# Patient Record
Sex: FEMALE | Race: WHITE | NOT HISPANIC OR LATINO | Employment: FULL TIME | URBAN - METROPOLITAN AREA
[De-identification: names, ages, dates, MRNs, and addresses within clinical notes are randomized per-mention and may not be internally consistent; named-entity substitution may affect disease eponyms.]

---
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
## 2020-11-30 ENCOUNTER — PRENATAL OFFICE VISIT (OUTPATIENT)
Dept: FAMILY MEDICINE | Facility: CLINIC | Age: 20
End: 2020-11-30
Payer: COMMERCIAL

## 2020-11-30 DIAGNOSIS — O09.90 SUPERVISION OF HIGH RISK PREGNANCY, ANTEPARTUM: Primary | ICD-10-CM

## 2020-11-30 PROCEDURE — 99207 PR NO CHARGE NURSE ONLY: CPT

## 2020-11-30 RX ORDER — PRENATAL VIT/IRON FUM/FOLIC AC 27MG-0.8MG
1 TABLET ORAL DAILY
Status: ON HOLD | COMMUNITY
End: 2021-02-08

## 2020-11-30 NOTE — PROGRESS NOTES
OB Intake phone visit with verbal patient permission.      SERGE [place] is  EDC by early ultrasound at [facility] is  2021.  See CareEverywhere.  She had one appointment. At [facility] in early July and hasn't had anymore visits as she was waiting for insurance.   She says she's had no problems and that the baby is active.      I have her scheduled for 1st OB lab work and gestational diabetes screening and fetal survey this Wed.,  and to see Dr. Burton to establish care Wed., .     Thanks

## 2020-12-02 ENCOUNTER — HOSPITAL ENCOUNTER (OUTPATIENT)
Dept: ULTRASOUND IMAGING | Facility: CLINIC | Age: 20
Discharge: HOME | End: 2020-12-02
Attending: FAMILY MEDICINE | Admitting: FAMILY MEDICINE
Payer: COMMERCIAL

## 2020-12-02 DIAGNOSIS — O09.90 SUPERVISION OF HIGH RISK PREGNANCY, ANTEPARTUM: ICD-10-CM

## 2020-12-02 PROCEDURE — 76805 OB US >/= 14 WKS SNGL FETUS: CPT

## 2020-12-09 ENCOUNTER — PRENATAL OFFICE VISIT (OUTPATIENT)
Dept: FAMILY MEDICINE | Facility: CLINIC | Age: 20
End: 2020-12-09
Payer: COMMERCIAL

## 2020-12-09 VITALS
RESPIRATION RATE: 18 BRPM | WEIGHT: 177 LBS | HEIGHT: 68 IN | HEART RATE: 120 BPM | TEMPERATURE: 95.7 F | SYSTOLIC BLOOD PRESSURE: 122 MMHG | DIASTOLIC BLOOD PRESSURE: 80 MMHG | BODY MASS INDEX: 26.83 KG/M2 | OXYGEN SATURATION: 95 %

## 2020-12-09 DIAGNOSIS — O09.90 HIGH-RISK PREGNANCY SUPERVISION, UNSPECIFIED TRIMESTER: Primary | ICD-10-CM

## 2020-12-09 PROCEDURE — 99207 PR PRENATAL VISIT: CPT | Performed by: FAMILY MEDICINE

## 2020-12-09 SDOH — HEALTH STABILITY: MENTAL HEALTH: HOW MANY STANDARD DRINKS CONTAINING ALCOHOL DO YOU HAVE ON A TYPICAL DAY?: NOT ASKED

## 2020-12-09 SDOH — HEALTH STABILITY: MENTAL HEALTH: HOW OFTEN DO YOU HAVE A DRINK CONTAINING ALCOHOL?: NEVER

## 2020-12-09 SDOH — HEALTH STABILITY: MENTAL HEALTH: HOW OFTEN DO YOU HAVE 6 OR MORE DRINKS ON ONE OCCASION?: NEVER

## 2020-12-09 ASSESSMENT — MIFFLIN-ST. JEOR: SCORE: 1616.37

## 2020-12-09 ASSESSMENT — PAIN SCALES - GENERAL: PAINLEVEL: NO PAIN (0)

## 2020-12-09 NOTE — PROGRESS NOTES
Very nice 20-year-old G3, P0 at 30 weeks gestation by first trimester ultrasound comes to establish care.  Was seen initially in [place] with first trimester bleeding and had an ultrasound done and has not followed up since then.  She had insurance issues, but now she is working and has insurance again.  She has had great fetal movement, no loss of fluid or vaginal bleeding.  Her first trimester labs are pending today.  She does not have time to complete her diabetic screen today but will return.  I will see her back in 2 weeks.  She has no significant past medical or surgical history.  Reviewed her ultrasound from 7 days ago as she did not have a 20-week anatomy scan, and there was plenty of suboptimal views.  Hope to repeat that in 3 to 4 weeks for both growth and another look.  Next visit plan for her full exam.

## 2020-12-24 ENCOUNTER — PRENATAL OFFICE VISIT (OUTPATIENT)
Dept: FAMILY MEDICINE | Facility: CLINIC | Age: 20
End: 2020-12-24
Payer: COMMERCIAL

## 2020-12-24 VITALS
RESPIRATION RATE: 18 BRPM | SYSTOLIC BLOOD PRESSURE: 116 MMHG | OXYGEN SATURATION: 97 % | HEART RATE: 96 BPM | WEIGHT: 179 LBS | BODY MASS INDEX: 27.22 KG/M2 | DIASTOLIC BLOOD PRESSURE: 68 MMHG | TEMPERATURE: 95.9 F

## 2020-12-24 DIAGNOSIS — Z23 NEED FOR VACCINATION: ICD-10-CM

## 2020-12-24 DIAGNOSIS — O09.90 HIGH-RISK PREGNANCY SUPERVISION, UNSPECIFIED TRIMESTER: Primary | ICD-10-CM

## 2020-12-24 PROCEDURE — 90715 TDAP VACCINE 7 YRS/> IM: CPT | Performed by: FAMILY MEDICINE

## 2020-12-24 PROCEDURE — 99207 PR PRENATAL VISIT: CPT | Performed by: FAMILY MEDICINE

## 2020-12-24 PROCEDURE — 90471 IMMUNIZATION ADMIN: CPT | Performed by: FAMILY MEDICINE

## 2020-12-24 ASSESSMENT — PAIN SCALES - GENERAL: PAINLEVEL: NO PAIN (0)

## 2020-12-24 NOTE — PROGRESS NOTES
Concerns:     Doing well.  No concerns today.  No vaginal bleeding, LOF.  No contractions.  Discussed kick counts and fetal movement.  Discussed PTL, PROM, and when to call or come in.  RTC 2 weeks  Plans on coming in next week for glucola and labs. Averse to needles.    Considerations:   1. Lack of prenatal care. Insurance issues. Initially seen in [facility]. Good dating  2. Inadequate views - plan repeat US  3. Needs exam    Prenatal Labs: No results found for: ABO, RH, AS, HEPBANG, CHPCRT, GCPCRT, TREPAB, RUBELLAABIGG, HGB, HIV     No results found for: PAP    Estimated Date of Delivery: Feb 17, 2021     3rd Trimester  GBS: __  Glucola:  ___  Imms: DtaP _x_, Flu_declined_    MD Jose Castillo MD

## 2020-12-24 NOTE — PROGRESS NOTES
Prior to immunization administration, verified patients identity using patient s name and date of birth. Please see Immunization Activity for additional information.     Screening Questionnaire for Adult Immunization    Are you sick today?   No   Do you have allergies to medications, food, a vaccine component or latex?   No   Have you ever had a serious reaction after receiving a vaccination?   No   Do you have a long-term health problem with heart, lung, kidney, or metabolic disease (e.g., diabetes), asthma, a blood disorder, no spleen, complement component deficiency, a cochlear implant, or a spinal fluid leak?  Are you on long-term aspirin therapy?   No   Do you have cancer, leukemia, HIV/AIDS, or any other immune system problem?   No   Do you have a parent, brother, or sister with an immune system problem?   No   In the past 3 months, have you taken medications that affect  your immune system, such as prednisone, other steroids, or anticancer drugs; drugs for the treatment of rheumatoid arthritis, Crohn s disease, or psoriasis; or have you had radiation treatments?   No   Have you had a seizure, or a brain or other nervous system problem?   No   During the past year, have you received a transfusion of blood or blood    products, or been given immune (gamma) globulin or antiviral drug?   No   For women: Are you pregnant or is there a chance you could become       pregnant during the next month?   No   Have you received any vaccinations in the past 4 weeks?   No     Immunization questionnaire answers were all negative.        Per orders of Dr. Burton, injection of Tdap  given by Gisselle Callaway MA. Patient instructed to remain in clinic for 15 minutes afterwards, and to report any adverse reaction to me immediately.       Screening performed by Gisselle Callaway MA on 12/24/2020 at 10:54 AM.

## 2021-01-07 ENCOUNTER — PRENATAL OFFICE VISIT (OUTPATIENT)
Dept: FAMILY MEDICINE | Facility: CLINIC | Age: 21
End: 2021-01-07
Payer: COMMERCIAL

## 2021-01-07 ENCOUNTER — HOSPITAL ENCOUNTER (OUTPATIENT)
Dept: ULTRASOUND IMAGING | Facility: CLINIC | Age: 21
Discharge: HOME | End: 2021-01-07
Attending: FAMILY MEDICINE | Admitting: FAMILY MEDICINE
Payer: COMMERCIAL

## 2021-01-07 VITALS
DIASTOLIC BLOOD PRESSURE: 80 MMHG | OXYGEN SATURATION: 96 % | BODY MASS INDEX: 27.58 KG/M2 | SYSTOLIC BLOOD PRESSURE: 120 MMHG | HEART RATE: 110 BPM | RESPIRATION RATE: 18 BRPM | WEIGHT: 182 LBS | TEMPERATURE: 97.2 F | HEIGHT: 68 IN

## 2021-01-07 DIAGNOSIS — Z71.6 ENCOUNTER FOR SMOKING CESSATION COUNSELING: ICD-10-CM

## 2021-01-07 DIAGNOSIS — A59.9 TRICHIMONIASIS: ICD-10-CM

## 2021-01-07 DIAGNOSIS — O09.90 HIGH-RISK PREGNANCY SUPERVISION, UNSPECIFIED TRIMESTER: ICD-10-CM

## 2021-01-07 DIAGNOSIS — O09.90 HIGH-RISK PREGNANCY SUPERVISION, UNSPECIFIED TRIMESTER: Primary | ICD-10-CM

## 2021-01-07 LAB
SPECIMEN SOURCE: ABNORMAL
WET PREP SPEC: ABNORMAL

## 2021-01-07 PROCEDURE — 87210 SMEAR WET MOUNT SALINE/INK: CPT | Performed by: FAMILY MEDICINE

## 2021-01-07 PROCEDURE — 76816 OB US FOLLOW-UP PER FETUS: CPT

## 2021-01-07 PROCEDURE — 99207 PR PRENATAL VISIT: CPT | Performed by: FAMILY MEDICINE

## 2021-01-07 PROCEDURE — G0145 SCR C/V CYTO,THINLAYER,RESCR: HCPCS | Performed by: FAMILY MEDICINE

## 2021-01-07 ASSESSMENT — PAIN SCALES - GENERAL: PAINLEVEL: NO PAIN (0)

## 2021-01-07 ASSESSMENT — MIFFLIN-ST. JEOR: SCORE: 1642.23

## 2021-01-07 NOTE — PROGRESS NOTES
"    Concerns:   Agrees to finally do her Glucola and a prenatal labs today  Completed her pelvic exam which shows an adequate birth canal, but thick greenish discharge present     doing well.  No concerns today.  No vaginal bleeding, LOF.  No contractions.  Discussed kick counts and fetal movement.  Discussed PTL, PROM, and when to call or come in.  RTC 2 weeks       Considerations:   1. Lack of prenatal care. Insurance issues. Initially seen in [facility]. Good dating based on first tri US  2. Inadequate views - plan repeat US  3. Needs exam  4. Smoker - cutting back from 3ppd, to 1/2ppd  5. Admits to occasional marijuana use (1x per wk)  6. History of multisubstance abuse 2 years ago.  Admits to being \"exposed\" via her significant other to methamphetamines recently which might show up in a urine drug screen.  But denies personal use      Prenatal Labs: No results found for: ABO, RH, AS, HEPBANG, CHPCRT, GCPCRT, TREPAB, RUBELLAABIGG, HGB, HIV      No results found for: PAP     Estimated Date of Delivery: Feb 17, 2021      3rd Trimester  GBS: __  Glucola:  ___  Imms: DtaP _x_, Flu_declined_  "

## 2021-01-08 ENCOUNTER — TELEPHONE (OUTPATIENT)
Dept: FAMILY MEDICINE | Facility: CLINIC | Age: 21
End: 2021-01-08

## 2021-01-08 NOTE — TELEPHONE ENCOUNTER
Called and LM for patient to call back. Please relay results below. Please see what pharmacy patient would like script sent to. Please help patient set up lab only appointment for missing labs.   Rosi Park MA

## 2021-01-08 NOTE — LETTER
[address] [address]  [phone]        January 12, 2021    Jyoti New  [address] [address] [place] [id number]          Dear Jyoti,    Please call us regarding your results ASAP.    Sincerely,        Jose Burton MD

## 2021-01-08 NOTE — TELEPHONE ENCOUNTER
----- Message from Jose Burton MD sent at 1/8/2021  4:21 PM CST -----  Please call with results.   She has trichomonas and needs antibiotics for her and her partner.  This is a STD.  Let me know if I can send a prescription.  She also did not do her labs and glucose test, will she come back to do that?

## 2021-01-11 LAB
COPATH REPORT: NORMAL
PAP: NORMAL

## 2021-01-12 NOTE — TELEPHONE ENCOUNTER
3rd attempt. Left message for patient to return call.  Sent letter  Natalie Jackson MA 1/12/2021

## 2021-01-20 ENCOUNTER — PRENATAL OFFICE VISIT (OUTPATIENT)
Dept: FAMILY MEDICINE | Facility: CLINIC | Age: 21
End: 2021-01-20
Payer: COMMERCIAL

## 2021-01-20 ENCOUNTER — TELEPHONE (OUTPATIENT)
Dept: FAMILY MEDICINE | Facility: CLINIC | Age: 21
End: 2021-01-20

## 2021-01-20 ENCOUNTER — HOSPITAL ENCOUNTER (OUTPATIENT)
Dept: GENERAL RADIOLOGY | Facility: CLINIC | Age: 21
Discharge: HOME | End: 2021-01-20
Attending: FAMILY MEDICINE | Admitting: FAMILY MEDICINE
Payer: COMMERCIAL

## 2021-01-20 DIAGNOSIS — R06.02 SHORTNESS OF BREATH: ICD-10-CM

## 2021-01-20 DIAGNOSIS — O09.90 HIGH-RISK PREGNANCY SUPERVISION, UNSPECIFIED TRIMESTER: Primary | ICD-10-CM

## 2021-01-20 LAB
SARS-COV-2 RNA RESP QL NAA+PROBE: NORMAL
SPECIMEN SOURCE: NORMAL

## 2021-01-20 PROCEDURE — 71046 X-RAY EXAM CHEST 2 VIEWS: CPT

## 2021-01-20 PROCEDURE — 99207 PR PRENATAL VISIT: CPT | Performed by: FAMILY MEDICINE

## 2021-01-20 PROCEDURE — U0005 INFEC AGEN DETEC AMPLI PROBE: HCPCS | Performed by: FAMILY MEDICINE

## 2021-01-20 PROCEDURE — U0003 INFECTIOUS AGENT DETECTION BY NUCLEIC ACID (DNA OR RNA); SEVERE ACUTE RESPIRATORY SYNDROME CORONAVIRUS 2 (SARS-COV-2) (CORONAVIRUS DISEASE [COVID-19]), AMPLIFIED PROBE TECHNIQUE, MAKING USE OF HIGH THROUGHPUT TECHNOLOGIES AS DESCRIBED BY CMS-2020-01-R: HCPCS | Performed by: FAMILY MEDICINE

## 2021-01-20 PROCEDURE — 99213 OFFICE O/P EST LOW 20 MIN: CPT | Mod: 25 | Performed by: FAMILY MEDICINE

## 2021-01-20 NOTE — TELEPHONE ENCOUNTER
Symptoms of chills cold hard time breathing.  Not sure if she can taste or smell anything.  Patient talked with the birth place but routed to the clinic for assessment and an order for a covid test.  Been warm but not tested jacoby CARDOZO

## 2021-01-20 NOTE — PROGRESS NOTES
"  Assessment & Plan       ICD-10-CM    1. High-risk pregnancy supervision, unspecified trimester  O09.90 SARS-CoV-2 COVID-19 Virus (Coronavirus) by PCR   2. Shortness of breath  R06.02 Symptomatic COVID-19 Virus (Coronavirus) by PCR     XR Chest 2 Views     Symptomatic COVID-19 Virus (Coronavirus) by PCR        A possible anosmia, shortness of breath with exertion, cough, low-grade fever and chills highly likely for COVID-19.  Swab obtained today and sent stat.  She is at high risk of severe disease given her third trimester status.  She is aware of this.  She will buy a home oximeter and do daily oxygen measurements and call me if decreasing into the low 90s.  I will also have my staff call her daily for an update.  Chest x-ray obtained today looking for infiltrates as well.  Strongly encouraged to quit smoking.         Tobacco Cessation:   reports that she has been smoking. She has never used smokeless tobacco.      BMI:   Estimated body mass index is 27.51 kg/m  as calculated from the following:    Height as of 1/7/21: 1.732 m (5' 8.2\").    Weight as of 1/7/21: 82.6 kg (182 lb).             No follow-ups on file.    Jose Burton MD  [facility] is a 20 year old who presents to clinic today for the following health issues     HPI       Acute Illness  Acute illness concerns: SOB cough  Onset/Duration: 6 days  Symptoms:  Fever: YES- unknown  Chills/Sweats: YES  Headache (location?): YES- at night  Sinus Pressure: no  Conjunctivitis:  YES  Ear Pain: no  Rhinorrhea: no  Congestion: YES  Sore Throat: no  Cough: YES-barking  Wheeze: no  Decreased Appetite: no  Nausea: YES  Vomiting: no  Diarrhea: YES- off and on  Dysuria/Freq.: no  Dysuria or Hematuria: no  Fatigue/Achiness: YES  Sick/Strep Exposure: no  Therapies tried and outcome: theraflu-not effective      Review of Systems         Objective    Pulse 90   Temp 97.7  F (36.5  C)   Resp 12   SpO2 99%   There is " no height or weight on file to calculate BMI.  Physical Exam   Well-appearing.  Nontoxic.  Mentation normal.  Oropharynx unremarkable.  Heart regular.  Lungs clear bilaterally with no increased work of breathing.  He is warm well perfused no edema.  Neurologically nonfocal.

## 2021-01-21 VITALS — RESPIRATION RATE: 12 BRPM | OXYGEN SATURATION: 99 % | HEART RATE: 90 BPM | TEMPERATURE: 97.7 F

## 2021-01-21 LAB
LABORATORY COMMENT REPORT: NORMAL
SARS-COV-2 RNA RESP QL NAA+PROBE: NEGATIVE
SPECIMEN SOURCE: NORMAL

## 2021-01-22 ENCOUNTER — TELEPHONE (OUTPATIENT)
Dept: FAMILY MEDICINE | Facility: CLINIC | Age: 21
End: 2021-01-22

## 2021-01-22 RX ORDER — AZITHROMYCIN 500 MG/1
1000 TABLET, FILM COATED ORAL ONCE
Qty: 2 TABLET | Refills: 0 | Status: SHIPPED | OUTPATIENT
Start: 2021-01-22 | End: 2021-01-22

## 2021-01-22 NOTE — TELEPHONE ENCOUNTER
----- Message from Jose Burton MD sent at 1/22/2021  4:33 PM CST -----  Please let her know I called in azithromycin 1 gm

## 2021-01-22 NOTE — TELEPHONE ENCOUNTER
Patient was informed. She will pick it up at the pharmacy and she stated understanding.    Carlos Manuel Gonzalez, CMA

## 2021-01-28 ENCOUNTER — PRENATAL OFFICE VISIT (OUTPATIENT)
Dept: FAMILY MEDICINE | Facility: CLINIC | Age: 21
End: 2021-01-28
Payer: COMMERCIAL

## 2021-01-28 DIAGNOSIS — O09.90 SUPERVISION OF HIGH RISK PREGNANCY, ANTEPARTUM: ICD-10-CM

## 2021-01-28 DIAGNOSIS — R05.9 COUGH: Primary | ICD-10-CM

## 2021-01-28 LAB
ABO + RH BLD: NORMAL
ABO + RH BLD: NORMAL
BLD GP AB SCN SERPL QL: NORMAL
BLOOD BANK CMNT PATIENT-IMP: NORMAL
ERYTHROCYTE [DISTWIDTH] IN BLOOD BY AUTOMATED COUNT: 12.2 % (ref 10–15)
FLUAV+FLUBV AG SPEC QL: NEGATIVE
FLUAV+FLUBV AG SPEC QL: NEGATIVE
GLUCOSE 1H P 50 G GLC PO SERPL-MCNC: 94 MG/DL (ref 60–129)
HCT VFR BLD AUTO: 36.1 % (ref 35–47)
HGB BLD-MCNC: 12.2 G/DL (ref 11.7–15.7)
LABORATORY COMMENT REPORT: NORMAL
MCH RBC QN AUTO: 30.4 PG (ref 26.5–33)
MCHC RBC AUTO-ENTMCNC: 33.8 G/DL (ref 31.5–36.5)
MCV RBC AUTO: 90 FL (ref 78–100)
PLATELET # BLD AUTO: 218 10E9/L (ref 150–450)
RBC # BLD AUTO: 4.01 10E12/L (ref 3.8–5.2)
SARS-COV-2 RNA RESP QL NAA+PROBE: NEGATIVE
SARS-COV-2 RNA RESP QL NAA+PROBE: NORMAL
SPECIMEN EXP DATE BLD: NORMAL
SPECIMEN SOURCE: NORMAL
WBC # BLD AUTO: 7.8 10E9/L (ref 4–11)

## 2021-01-28 PROCEDURE — 87086 URINE CULTURE/COLONY COUNT: CPT | Performed by: FAMILY MEDICINE

## 2021-01-28 PROCEDURE — 85027 COMPLETE CBC AUTOMATED: CPT | Performed by: FAMILY MEDICINE

## 2021-01-28 PROCEDURE — 86850 RBC ANTIBODY SCREEN: CPT | Performed by: FAMILY MEDICINE

## 2021-01-28 PROCEDURE — 82950 GLUCOSE TEST: CPT | Performed by: FAMILY MEDICINE

## 2021-01-28 PROCEDURE — 86762 RUBELLA ANTIBODY: CPT | Performed by: FAMILY MEDICINE

## 2021-01-28 PROCEDURE — U0003 INFECTIOUS AGENT DETECTION BY NUCLEIC ACID (DNA OR RNA); SEVERE ACUTE RESPIRATORY SYNDROME CORONAVIRUS 2 (SARS-COV-2) (CORONAVIRUS DISEASE [COVID-19]), AMPLIFIED PROBE TECHNIQUE, MAKING USE OF HIGH THROUGHPUT TECHNOLOGIES AS DESCRIBED BY CMS-2020-01-R: HCPCS | Performed by: FAMILY MEDICINE

## 2021-01-28 PROCEDURE — 87340 HEPATITIS B SURFACE AG IA: CPT | Performed by: FAMILY MEDICINE

## 2021-01-28 PROCEDURE — 86900 BLOOD TYPING SEROLOGIC ABO: CPT | Performed by: FAMILY MEDICINE

## 2021-01-28 PROCEDURE — 86780 TREPONEMA PALLIDUM: CPT | Mod: 90 | Performed by: FAMILY MEDICINE

## 2021-01-28 PROCEDURE — 99207 PR PRENATAL VISIT: CPT | Performed by: FAMILY MEDICINE

## 2021-01-28 PROCEDURE — 87804 INFLUENZA ASSAY W/OPTIC: CPT | Performed by: FAMILY MEDICINE

## 2021-01-28 PROCEDURE — 36415 COLL VENOUS BLD VENIPUNCTURE: CPT | Performed by: FAMILY MEDICINE

## 2021-01-28 PROCEDURE — 87389 HIV-1 AG W/HIV-1&-2 AB AG IA: CPT | Performed by: FAMILY MEDICINE

## 2021-01-28 PROCEDURE — 87653 STREP B DNA AMP PROBE: CPT | Performed by: FAMILY MEDICINE

## 2021-01-28 PROCEDURE — 99000 SPECIMEN HANDLING OFFICE-LAB: CPT | Performed by: FAMILY MEDICINE

## 2021-01-28 PROCEDURE — U0005 INFEC AGEN DETEC AMPLI PROBE: HCPCS | Performed by: FAMILY MEDICINE

## 2021-01-28 PROCEDURE — 86901 BLOOD TYPING SEROLOGIC RH(D): CPT | Performed by: FAMILY MEDICINE

## 2021-01-28 NOTE — PROGRESS NOTES
Concerns:   Feeling 50% better.  No further fevers.  Still mildly short of breath with activity.  Mild dry cough present.  No lower extremity edema.  Good fetal movement.  No vaginal bleeding, LOF, contractions.  No HA, RUQ pain, N/V, visual changes.  Labor precautions discussed.  RTC 1 week.    Repeat Covid swab today, last one was negative last week  Influenza swab  GBS swab obtained  Had nursing staff personally walked her to lab for completion of her 28-week Glucola, prenatal labs, urine drug screen.  Seen in the isolation room  Cervix check next visit    Jose Burton MD

## 2021-01-29 LAB
BACTERIA SPEC CULT: NORMAL
GP B STREP DNA SPEC QL NAA+PROBE: NEGATIVE
HBV SURFACE AG SERPL QL IA: NONREACTIVE
HIV 1+2 AB+HIV1 P24 AG SERPL QL IA: NONREACTIVE
Lab: NORMAL
RUBV IGG SERPL IA-ACNC: 35 IU/ML
SPECIMEN SOURCE: NORMAL
SPECIMEN SOURCE: NORMAL
T PALLIDUM AB SER QL: NONREACTIVE

## 2021-02-01 ENCOUNTER — NURSE TRIAGE (OUTPATIENT)
Dept: FAMILY MEDICINE | Facility: CLINIC | Age: 21
End: 2021-02-01

## 2021-02-01 ENCOUNTER — HOSPITAL ENCOUNTER (OUTPATIENT)
Facility: CLINIC | Age: 21
Discharge: HOME | End: 2021-02-01
Attending: FAMILY MEDICINE | Admitting: FAMILY MEDICINE
Payer: COMMERCIAL

## 2021-02-01 VITALS — TEMPERATURE: 97.5 F | DIASTOLIC BLOOD PRESSURE: 70 MMHG | SYSTOLIC BLOOD PRESSURE: 136 MMHG

## 2021-02-01 LAB
ALBUMIN UR-MCNC: NEGATIVE MG/DL
AMPHETAMINES UR QL: NOT DETECTED NG/ML
APPEARANCE UR: CLEAR
BACTERIA #/AREA URNS HPF: ABNORMAL /HPF
BARBITURATES UR QL SCN: NOT DETECTED NG/ML
BENZODIAZ UR QL SCN: NOT DETECTED NG/ML
BILIRUB UR QL STRIP: NEGATIVE
BUPRENORPHINE UR QL: NOT DETECTED NG/ML
CANNABINOIDS UR QL: NOT DETECTED NG/ML
COCAINE UR QL SCN: NOT DETECTED NG/ML
COLOR UR AUTO: YELLOW
D-METHAMPHET UR QL: NOT DETECTED NG/ML
GLUCOSE UR STRIP-MCNC: NEGATIVE MG/DL
HGB UR QL STRIP: NEGATIVE
KETONES UR STRIP-MCNC: NEGATIVE MG/DL
LEUKOCYTE ESTERASE UR QL STRIP: ABNORMAL
METHADONE UR QL SCN: NOT DETECTED NG/ML
MUCOUS THREADS #/AREA URNS LPF: PRESENT /LPF
NITRATE UR QL: NEGATIVE
OPIATES UR QL SCN: NOT DETECTED NG/ML
OXYCODONE UR QL SCN: NOT DETECTED NG/ML
PCP UR QL SCN: NOT DETECTED NG/ML
PH UR STRIP: 7 PH (ref 5–7)
PROPOXYPH UR QL: NOT DETECTED NG/ML
RBC #/AREA URNS AUTO: 1 /HPF (ref 0–2)
RUPTURE OF FETAL MEMBRANES BY ROM PLUS: NEGATIVE
SOURCE: ABNORMAL
SP GR UR STRIP: 1.01 (ref 1–1.03)
SPECIMEN SOURCE: NORMAL
SQUAMOUS #/AREA URNS AUTO: 7 /HPF (ref 0–1)
TRICYCLICS UR QL SCN: NOT DETECTED NG/ML
UROBILINOGEN UR STRIP-MCNC: 0 MG/DL (ref 0–2)
WBC #/AREA URNS AUTO: 2 /HPF (ref 0–5)
WET PREP SPEC: NORMAL

## 2021-02-01 PROCEDURE — 84112 EVAL AMNIOTIC FLUID PROTEIN: CPT | Performed by: FAMILY MEDICINE

## 2021-02-01 PROCEDURE — 80306 DRUG TEST PRSMV INSTRMNT: CPT | Performed by: FAMILY MEDICINE

## 2021-02-01 PROCEDURE — G0463 HOSPITAL OUTPT CLINIC VISIT: HCPCS

## 2021-02-01 PROCEDURE — 87210 SMEAR WET MOUNT SALINE/INK: CPT | Performed by: FAMILY MEDICINE

## 2021-02-01 PROCEDURE — 81001 URINALYSIS AUTO W/SCOPE: CPT | Performed by: FAMILY MEDICINE

## 2021-02-01 NOTE — PROGRESS NOTES
Lab results all WNL.  Last two blood pressures were WNL.  Called jodi Ryan for discharge to home.

## 2021-02-01 NOTE — PROGRESS NOTES
S:  Discharge from triage.   A:  FHT's 125, Moderate variability, Accels present, no decels noted. Contractions none.  Cervical exam not checked this visit. Wet prep, UA, and ROM+ were all negative.  R:  Written and verbal D/C instruction provided. Pt. Verbalized understanding of D/C instructions and will follow up with Dr. bradford as previously scheduled.   Verbalizes understanding that she will call or return to the [facility] with any further questions or concerns.   Pt. D/C'd via ambulation with SO    Nursing Discharge Checklist  Discharge order entered: Yes  Patient care order entered: Yes  Charges entered: Yes  IV start and stop times have been documented in Epic? N/A  NST start and stop times have been documented in Epic Doc F/S? N/A

## 2021-02-01 NOTE — PROGRESS NOTES
S: Triage Arrival  B: Jyoti is a 20 y.o.  @ 37w 5d who presents with complaint of possible labor. Had some contractions last evening. Noted discharge more today.   A: EFM applied. FHT's125 with moderate variability, accels present, no decels noted on strip. Contractions none at this point. Collected a wet prep and ROM+ and UA sent to lab. Patient is a smoker and admits to history of marijuana use during the pregnancy.   R: Will notify MD to obtain further orders.

## 2021-02-01 NOTE — DISCHARGE INSTRUCTIONS
OB Triage Discharge Instructions    Diet:  Regular diet as tolerated    Activity:  As tolerated    Other Special Instructions:  Keep MD appointment on Thursday    Reason for being seen in L&D:  Possible labor, increased discharge    Treatment/procedures performed in L&D: monitoring, VS, lab work    Call the Birthplace at [phone] if you have:  ? 5 or more contractions in one hour  ? Leaking of fluid from your vaginal area  ? Decreased fetal movement (follow kick count instructions)  ? Bleeding from your vaginal area  ? Swelling in your face, or increased swelling in your hands or legs  ? Headaches or vision problems such as blurring or seeing spots or starts  ? Nausea or vomiting lasting for more than 24 hours  ? An increase in weight (5lbs/week)  ? Epigastric pain (sometimes confused with heartburn that does not go away or a very bad stomach ache)    If you have any questions, please follow up with your doctor.        Patient Signature: ______________________________________________________________  By signing the above I acknowledge that a nurse or my care provider has explained the instruction to me and I have had any questions regarding my care explained to me.        Discharge Nurse Signature: _______________________________ 2/1/2021  10:02 AM    Method of discharge: ambulation    Accompanied by: SO  Time of discharge: 9111

## 2021-02-01 NOTE — TELEPHONE ENCOUNTER
"Patient is unsure how far apart her contractions are, but believes them to be < 5 minutes.  She states she is in pain and believes she lost her mucous plug over the weekend.  She states she had some spotting last night.    RN called L&D to inform them patient was on her way.    Reason for Disposition    Vaginal bleeding or spotting    First baby (primipara) with contractions < 6 minutes apart, and present 2 hours    Additional Information    Negative: Pregnant < 37 weeks (i.e., )    Negative: Uncertain delivery date and possibly pregnant < 37 weeks (i.e., )    Negative: Passed out (i.e., fainted, collapsed and was not responding)    Negative: Shock suspected (e.g., cold/pale/clammy skin, too weak to stand, low BP, rapid pulse)    Negative: Difficult to awaken or acting confused (e.g., disoriented, slurred speech)    Negative: SEVERE constant abdominal pain (e.g., excruciating) and present > 1 hour    Negative: Severe bleeding (e.g., continuous red blood from vagina, or large blood clots)    Negative: Umbilical cord hanging out of the vagina (shiny, white, curled appearance, \"like telephone cord\")    Negative: Uncontrollable urge to push (i.e., feels like baby is coming out now)    Negative: Can see baby    Negative: Sounds like a life-threatening emergency to the triager    Protocols used: PREGNANCY - LABOR-A-OH      "

## 2021-02-04 ENCOUNTER — PRENATAL OFFICE VISIT (OUTPATIENT)
Dept: FAMILY MEDICINE | Facility: CLINIC | Age: 21
End: 2021-02-04
Payer: COMMERCIAL

## 2021-02-04 VITALS
BODY MASS INDEX: 29.63 KG/M2 | TEMPERATURE: 95.8 F | OXYGEN SATURATION: 97 % | SYSTOLIC BLOOD PRESSURE: 128 MMHG | DIASTOLIC BLOOD PRESSURE: 88 MMHG | WEIGHT: 196 LBS | HEART RATE: 114 BPM | RESPIRATION RATE: 18 BRPM

## 2021-02-04 DIAGNOSIS — O09.90 HIGH-RISK PREGNANCY SUPERVISION, UNSPECIFIED TRIMESTER: Primary | ICD-10-CM

## 2021-02-04 PROCEDURE — 99207 PR PRENATAL VISIT: CPT | Performed by: FAMILY MEDICINE

## 2021-02-04 ASSESSMENT — PAIN SCALES - GENERAL: PAINLEVEL: NO PAIN (0)

## 2021-02-04 NOTE — PROGRESS NOTES
"Doing better  Cough mostly gone. covid neg x2. Flu neg  3 cm today !    Considerations:   1. Lack of prenatal care. Insurance issues. Initially seen in [facility]. Good dating based on first tri US. No care after that  2. Inadequate views - plan repeat US  3. Needs exam  4. Smoker - cutting back from 3ppd, to 1/2ppd  5. Admits to occasional marijuana use (1x per wk)  6. History of multisubstance abuse 2 years ago.  Admits to being \"exposed\" via her significant other to methamphetamines recently which might show up in a urine drug screen.  But denies personal use. UDS neg      Prenatal Labs:   Lab Results   Component Value Date    ABO A 01/28/2021    RH Pos 01/28/2021    AS Neg 01/28/2021    HEPBANG Nonreactive 01/28/2021    HGB 12.2 01/28/2021        Lab Results   Component Value Date    PAP NIL 01/07/2021       Estimated Date of Delivery: Feb 17, 2021     3rd Trimester  GBS: _neg_  Glucola:  _neg__  Imms: DtaP _x_, Flu__    Jose Burton MD        "

## 2021-02-05 ENCOUNTER — TELEPHONE (OUTPATIENT)
Dept: FAMILY MEDICINE | Facility: CLINIC | Age: 21
End: 2021-02-05

## 2021-02-05 NOTE — TELEPHONE ENCOUNTER
I called pt and left msg that the forms are done and they can be picked up at the .  Nataliia Callaway MA

## 2021-02-05 NOTE — TELEPHONE ENCOUNTER
Dr. GARCÍA    Patient had seen you yesterday and gave FMLA forms to you to fill out.. she was checking to see if you had them done.  I called and spoke to Rosa CARDOZO and said that they are not done yet.    Please call when done and she will  at clinic.    Okay to lm

## 2021-02-06 ENCOUNTER — HOSPITAL ENCOUNTER (INPATIENT)
Facility: CLINIC | Age: 21
LOS: 2 days | Discharge: HOME | End: 2021-02-08
Attending: FAMILY MEDICINE | Admitting: OBSTETRICS & GYNECOLOGY
Payer: COMMERCIAL

## 2021-02-06 ENCOUNTER — APPOINTMENT (OUTPATIENT)
Dept: GENERAL RADIOLOGY | Facility: CLINIC | Age: 21
End: 2021-02-06
Attending: OBSTETRICS & GYNECOLOGY
Payer: COMMERCIAL

## 2021-02-06 DIAGNOSIS — U07.1 LAB TEST POSITIVE FOR DETECTION OF COVID-19 VIRUS: Primary | ICD-10-CM

## 2021-02-06 DIAGNOSIS — D62 ANEMIA DUE TO BLOOD LOSS, ACUTE: ICD-10-CM

## 2021-02-06 PROBLEM — Z37.9 NORMAL LABOR: Status: ACTIVE | Noted: 2021-02-06

## 2021-02-06 LAB
ABO + RH BLD: NORMAL
ABO + RH BLD: NORMAL
ALBUMIN SERPL-MCNC: 2.4 G/DL (ref 3.4–5)
ALP SERPL-CCNC: 212 U/L (ref 40–150)
ALT SERPL W P-5'-P-CCNC: 27 U/L (ref 0–50)
ALT SERPL W P-5'-P-CCNC: 30 U/L (ref 0–50)
AMPHETAMINES UR QL: NOT DETECTED NG/ML
ANION GAP SERPL CALCULATED.3IONS-SCNC: 6 MMOL/L (ref 3–14)
ANION GAP SERPL CALCULATED.3IONS-SCNC: 7 MMOL/L (ref 3–14)
APTT PPP: 27 SEC (ref 22–37)
AST SERPL W P-5'-P-CCNC: 28 U/L (ref 0–45)
AST SERPL W P-5'-P-CCNC: 31 U/L (ref 0–45)
BARBITURATES UR QL SCN: NOT DETECTED NG/ML
BASOPHILS # BLD AUTO: 0.1 10E9/L (ref 0–0.2)
BASOPHILS NFR BLD AUTO: 0.5 %
BENZODIAZ UR QL SCN: NOT DETECTED NG/ML
BILIRUB SERPL-MCNC: 0.2 MG/DL (ref 0.2–1.3)
BLD GP AB SCN SERPL QL: NORMAL
BLOOD BANK CMNT PATIENT-IMP: NORMAL
BUN SERPL-MCNC: 10 MG/DL (ref 7–30)
BUN SERPL-MCNC: 11 MG/DL (ref 7–30)
BUPRENORPHINE UR QL: NOT DETECTED NG/ML
CALCIUM SERPL-MCNC: 8.4 MG/DL (ref 8.5–10.1)
CALCIUM SERPL-MCNC: 9.2 MG/DL (ref 8.5–10.1)
CANNABINOIDS UR QL: NOT DETECTED NG/ML
CHLORIDE SERPL-SCNC: 107 MMOL/L (ref 94–109)
CHLORIDE SERPL-SCNC: 108 MMOL/L (ref 94–109)
CO2 SERPL-SCNC: 23 MMOL/L (ref 20–32)
CO2 SERPL-SCNC: 23 MMOL/L (ref 20–32)
COCAINE UR QL SCN: NOT DETECTED NG/ML
CREAT SERPL-MCNC: 0.68 MG/DL (ref 0.52–1.04)
CREAT SERPL-MCNC: 0.7 MG/DL (ref 0.52–1.04)
CREAT UR-MCNC: 51 MG/DL
CRP SERPL-MCNC: <2.9 MG/L (ref 0–8)
D DIMER PPP FEU-MCNC: 3.9 UG/ML FEU (ref 0–0.5)
D-METHAMPHET UR QL: NOT DETECTED NG/ML
DIFFERENTIAL METHOD BLD: ABNORMAL
EOSINOPHIL NFR BLD AUTO: 0.5 %
ERYTHROCYTE [DISTWIDTH] IN BLOOD BY AUTOMATED COUNT: 12.5 % (ref 10–15)
FIBRINOGEN PPP-MCNC: 459 MG/DL (ref 200–420)
GFR SERPL CREATININE-BSD FRML MDRD: >90 ML/MIN/{1.73_M2}
GFR SERPL CREATININE-BSD FRML MDRD: >90 ML/MIN/{1.73_M2}
GLUCOSE SERPL-MCNC: 79 MG/DL (ref 70–99)
GLUCOSE SERPL-MCNC: 96 MG/DL (ref 70–99)
HCT VFR BLD AUTO: 38.3 % (ref 35–47)
HGB BLD-MCNC: 13 G/DL (ref 11.7–15.7)
IMM GRANULOCYTES # BLD: 0.1 10E9/L (ref 0–0.4)
IMM GRANULOCYTES NFR BLD: 0.5 %
INR PPP: 0.93 (ref 0.86–1.14)
LABORATORY COMMENT REPORT: ABNORMAL
LDH SERPL L TO P-CCNC: 241 U/L (ref 81–234)
LYMPHOCYTES # BLD AUTO: 1.7 10E9/L (ref 0.8–5.3)
LYMPHOCYTES NFR BLD AUTO: 13 %
MCH RBC QN AUTO: 30.6 PG (ref 26.5–33)
MCHC RBC AUTO-ENTMCNC: 33.9 G/DL (ref 31.5–36.5)
MCV RBC AUTO: 90 FL (ref 78–100)
METHADONE UR QL SCN: NOT DETECTED NG/ML
MONOCYTES # BLD AUTO: 0.9 10E9/L (ref 0–1.3)
MONOCYTES NFR BLD AUTO: 7.2 %
NEUTROPHILS # BLD AUTO: 10.3 10E9/L (ref 1.6–8.3)
NEUTROPHILS NFR BLD AUTO: 78.3 %
NRBC # BLD AUTO: 0 10*3/UL
NRBC BLD AUTO-RTO: 0 /100
OPIATES UR QL SCN: NOT DETECTED NG/ML
OXYCODONE UR QL SCN: NOT DETECTED NG/ML
PCP UR QL SCN: NOT DETECTED NG/ML
PLATELET # BLD AUTO: 240 10E9/L (ref 150–450)
POTASSIUM SERPL-SCNC: 4 MMOL/L (ref 3.4–5.3)
POTASSIUM SERPL-SCNC: 4.9 MMOL/L (ref 3.4–5.3)
PROPOXYPH UR QL: NOT DETECTED NG/ML
PROT SERPL-MCNC: 6.2 G/DL (ref 6.8–8.8)
PROT UR-MCNC: 0.12 G/L
PROT/CREAT 24H UR: 0.24 G/G CR (ref 0–0.2)
RBC # BLD AUTO: 4.25 10E12/L (ref 3.8–5.2)
RUPTURE OF FETAL MEMBRANES BY ROM PLUS: POSITIVE
SARS-COV-2 RNA RESP QL NAA+PROBE: POSITIVE
SODIUM SERPL-SCNC: 137 MMOL/L (ref 133–144)
SODIUM SERPL-SCNC: 137 MMOL/L (ref 133–144)
SPECIMEN EXP DATE BLD: NORMAL
SPECIMEN SOURCE: ABNORMAL
TRICYCLICS UR QL SCN: NOT DETECTED NG/ML
TROPONIN I SERPL-MCNC: <0.015 UG/L (ref 0–0.04)
WBC # BLD AUTO: 13.1 10E9/L (ref 4–11)

## 2021-02-06 PROCEDURE — 85379 FIBRIN DEGRADATION QUANT: CPT | Performed by: OBSTETRICS & GYNECOLOGY

## 2021-02-06 PROCEDURE — 722N000001 HC LABOR CARE VAGINAL DELIVERY SINGLE

## 2021-02-06 PROCEDURE — 85025 COMPLETE CBC W/AUTO DIFF WBC: CPT | Performed by: OBSTETRICS & GYNECOLOGY

## 2021-02-06 PROCEDURE — 250N000011 HC RX IP 250 OP 636: Performed by: OBSTETRICS & GYNECOLOGY

## 2021-02-06 PROCEDURE — 0UQMXZZ REPAIR VULVA, EXTERNAL APPROACH: ICD-10-PCS | Performed by: OBSTETRICS & GYNECOLOGY

## 2021-02-06 PROCEDURE — 250N000009 HC RX 250

## 2021-02-06 PROCEDURE — 85610 PROTHROMBIN TIME: CPT | Performed by: OBSTETRICS & GYNECOLOGY

## 2021-02-06 PROCEDURE — 80053 COMPREHEN METABOLIC PANEL: CPT | Performed by: OBSTETRICS & GYNECOLOGY

## 2021-02-06 PROCEDURE — 85730 THROMBOPLASTIN TIME PARTIAL: CPT | Performed by: OBSTETRICS & GYNECOLOGY

## 2021-02-06 PROCEDURE — 80306 DRUG TEST PRSMV INSTRMNT: CPT | Performed by: OBSTETRICS & GYNECOLOGY

## 2021-02-06 PROCEDURE — 80048 BASIC METABOLIC PNL TOTAL CA: CPT | Performed by: OBSTETRICS & GYNECOLOGY

## 2021-02-06 PROCEDURE — 83615 LACTATE (LD) (LDH) ENZYME: CPT | Performed by: OBSTETRICS & GYNECOLOGY

## 2021-02-06 PROCEDURE — 86900 BLOOD TYPING SEROLOGIC ABO: CPT | Performed by: OBSTETRICS & GYNECOLOGY

## 2021-02-06 PROCEDURE — 258N000003 HC RX IP 258 OP 636: Performed by: OBSTETRICS & GYNECOLOGY

## 2021-02-06 PROCEDURE — 86140 C-REACTIVE PROTEIN: CPT | Performed by: OBSTETRICS & GYNECOLOGY

## 2021-02-06 PROCEDURE — 84460 ALANINE AMINO (ALT) (SGPT): CPT | Performed by: OBSTETRICS & GYNECOLOGY

## 2021-02-06 PROCEDURE — 85384 FIBRINOGEN ACTIVITY: CPT | Performed by: OBSTETRICS & GYNECOLOGY

## 2021-02-06 PROCEDURE — 86780 TREPONEMA PALLIDUM: CPT | Performed by: OBSTETRICS & GYNECOLOGY

## 2021-02-06 PROCEDURE — 250N000013 HC RX MED GY IP 250 OP 250 PS 637: Performed by: OBSTETRICS & GYNECOLOGY

## 2021-02-06 PROCEDURE — 36415 COLL VENOUS BLD VENIPUNCTURE: CPT | Performed by: OBSTETRICS & GYNECOLOGY

## 2021-02-06 PROCEDURE — 86901 BLOOD TYPING SEROLOGIC RH(D): CPT | Performed by: OBSTETRICS & GYNECOLOGY

## 2021-02-06 PROCEDURE — 84156 ASSAY OF PROTEIN URINE: CPT | Performed by: OBSTETRICS & GYNECOLOGY

## 2021-02-06 PROCEDURE — 250N000009 HC RX 250: Performed by: OBSTETRICS & GYNECOLOGY

## 2021-02-06 PROCEDURE — 84112 EVAL AMNIOTIC FLUID PROTEIN: CPT | Performed by: FAMILY MEDICINE

## 2021-02-06 PROCEDURE — 120N000001 HC R&B MED SURG/OB

## 2021-02-06 PROCEDURE — 84450 TRANSFERASE (AST) (SGOT): CPT | Performed by: OBSTETRICS & GYNECOLOGY

## 2021-02-06 PROCEDURE — 84484 ASSAY OF TROPONIN QUANT: CPT | Performed by: OBSTETRICS & GYNECOLOGY

## 2021-02-06 PROCEDURE — 59400 OBSTETRICAL CARE: CPT | Performed by: OBSTETRICS & GYNECOLOGY

## 2021-02-06 PROCEDURE — 87635 SARS-COV-2 COVID-19 AMP PRB: CPT | Performed by: OBSTETRICS & GYNECOLOGY

## 2021-02-06 PROCEDURE — 86850 RBC ANTIBODY SCREEN: CPT | Performed by: OBSTETRICS & GYNECOLOGY

## 2021-02-06 PROCEDURE — 71045 X-RAY EXAM CHEST 1 VIEW: CPT

## 2021-02-06 RX ORDER — MAGNESIUM SULFATE HEPTAHYDRATE 40 MG/ML
2 INJECTION, SOLUTION INTRAVENOUS
Status: DISCONTINUED | OUTPATIENT
Start: 2021-02-06 | End: 2021-02-08 | Stop reason: HOSPADM

## 2021-02-06 RX ORDER — AMOXICILLIN 250 MG
2 CAPSULE ORAL 2 TIMES DAILY
Status: DISCONTINUED | OUTPATIENT
Start: 2021-02-06 | End: 2021-02-08 | Stop reason: HOSPADM

## 2021-02-06 RX ORDER — IBUPROFEN 800 MG/1
800 TABLET, FILM COATED ORAL
Status: DISCONTINUED | OUTPATIENT
Start: 2021-02-06 | End: 2021-02-06

## 2021-02-06 RX ORDER — NALOXONE HYDROCHLORIDE 0.4 MG/ML
0.2 INJECTION, SOLUTION INTRAMUSCULAR; INTRAVENOUS; SUBCUTANEOUS
Status: DISCONTINUED | OUTPATIENT
Start: 2021-02-06 | End: 2021-02-06

## 2021-02-06 RX ORDER — ACETAMINOPHEN 325 MG/1
650 TABLET ORAL EVERY 4 HOURS PRN
Status: DISCONTINUED | OUTPATIENT
Start: 2021-02-06 | End: 2021-02-06

## 2021-02-06 RX ORDER — FENTANYL CITRATE 50 UG/ML
50-100 INJECTION, SOLUTION INTRAMUSCULAR; INTRAVENOUS
Status: DISCONTINUED | OUTPATIENT
Start: 2021-02-06 | End: 2021-02-06

## 2021-02-06 RX ORDER — MODIFIED LANOLIN
OINTMENT (GRAM) TOPICAL
Status: DISCONTINUED | OUTPATIENT
Start: 2021-02-06 | End: 2021-02-08 | Stop reason: HOSPADM

## 2021-02-06 RX ORDER — OXYTOCIN/0.9 % SODIUM CHLORIDE 30/500 ML
340 PLASTIC BAG, INJECTION (ML) INTRAVENOUS CONTINUOUS PRN
Status: DISCONTINUED | OUTPATIENT
Start: 2021-02-06 | End: 2021-02-08 | Stop reason: HOSPADM

## 2021-02-06 RX ORDER — TRANEXAMIC ACID 10 MG/ML
1 INJECTION, SOLUTION INTRAVENOUS EVERY 30 MIN PRN
Status: DISCONTINUED | OUTPATIENT
Start: 2021-02-06 | End: 2021-02-06

## 2021-02-06 RX ORDER — CALCIUM GLUCONATE 94 MG/ML
1 INJECTION, SOLUTION INTRAVENOUS
Status: DISCONTINUED | OUTPATIENT
Start: 2021-02-06 | End: 2021-02-08 | Stop reason: HOSPADM

## 2021-02-06 RX ORDER — BISACODYL 10 MG
10 SUPPOSITORY, RECTAL RECTAL DAILY PRN
Status: DISCONTINUED | OUTPATIENT
Start: 2021-02-08 | End: 2021-02-08 | Stop reason: HOSPADM

## 2021-02-06 RX ORDER — CARBOPROST TROMETHAMINE 250 UG/ML
250 INJECTION, SOLUTION INTRAMUSCULAR
Status: DISCONTINUED | OUTPATIENT
Start: 2021-02-06 | End: 2021-02-06

## 2021-02-06 RX ORDER — AMOXICILLIN 250 MG
1 CAPSULE ORAL 2 TIMES DAILY
Status: DISCONTINUED | OUTPATIENT
Start: 2021-02-06 | End: 2021-02-08 | Stop reason: HOSPADM

## 2021-02-06 RX ORDER — LIDOCAINE HYDROCHLORIDE 10 MG/ML
INJECTION, SOLUTION INFILTRATION; PERINEURAL
Status: COMPLETED
Start: 2021-02-06 | End: 2021-02-06

## 2021-02-06 RX ORDER — OXYTOCIN 10 [USP'U]/ML
10 INJECTION, SOLUTION INTRAMUSCULAR; INTRAVENOUS
Status: DISCONTINUED | OUTPATIENT
Start: 2021-02-06 | End: 2021-02-08 | Stop reason: HOSPADM

## 2021-02-06 RX ORDER — METHYLERGONOVINE MALEATE 0.2 MG/ML
200 INJECTION INTRAVENOUS
Status: DISCONTINUED | OUTPATIENT
Start: 2021-02-06 | End: 2021-02-06

## 2021-02-06 RX ORDER — OXYTOCIN 10 [USP'U]/ML
10 INJECTION, SOLUTION INTRAMUSCULAR; INTRAVENOUS
Status: DISCONTINUED | OUTPATIENT
Start: 2021-02-06 | End: 2021-02-06

## 2021-02-06 RX ORDER — TRANEXAMIC ACID 10 MG/ML
1 INJECTION, SOLUTION INTRAVENOUS EVERY 30 MIN PRN
Status: DISCONTINUED | OUTPATIENT
Start: 2021-02-06 | End: 2021-02-08 | Stop reason: HOSPADM

## 2021-02-06 RX ORDER — LIDOCAINE HYDROCHLORIDE 10 MG/ML
INJECTION, SOLUTION EPIDURAL; INFILTRATION; INTRACAUDAL; PERINEURAL
Status: COMPLETED
Start: 2021-02-06 | End: 2021-02-06

## 2021-02-06 RX ORDER — HYDROCORTISONE 2.5 %
CREAM (GRAM) TOPICAL 3 TIMES DAILY PRN
Status: DISCONTINUED | OUTPATIENT
Start: 2021-02-06 | End: 2021-02-08 | Stop reason: HOSPADM

## 2021-02-06 RX ORDER — LORAZEPAM 2 MG/ML
2 INJECTION INTRAMUSCULAR
Status: DISCONTINUED | OUTPATIENT
Start: 2021-02-06 | End: 2021-02-08 | Stop reason: HOSPADM

## 2021-02-06 RX ORDER — OXYTOCIN/0.9 % SODIUM CHLORIDE 30/500 ML
100-340 PLASTIC BAG, INJECTION (ML) INTRAVENOUS CONTINUOUS PRN
Status: COMPLETED | OUTPATIENT
Start: 2021-02-06 | End: 2021-02-06

## 2021-02-06 RX ORDER — SODIUM CHLORIDE, SODIUM LACTATE, POTASSIUM CHLORIDE, CALCIUM CHLORIDE 600; 310; 30; 20 MG/100ML; MG/100ML; MG/100ML; MG/100ML
INJECTION, SOLUTION INTRAVENOUS CONTINUOUS
Status: DISCONTINUED | OUTPATIENT
Start: 2021-02-06 | End: 2021-02-06

## 2021-02-06 RX ORDER — NALOXONE HYDROCHLORIDE 0.4 MG/ML
0.4 INJECTION, SOLUTION INTRAMUSCULAR; INTRAVENOUS; SUBCUTANEOUS
Status: DISCONTINUED | OUTPATIENT
Start: 2021-02-06 | End: 2021-02-06

## 2021-02-06 RX ORDER — MAGNESIUM SULFATE HEPTAHYDRATE 40 MG/ML
4 INJECTION, SOLUTION INTRAVENOUS ONCE
Status: COMPLETED | OUTPATIENT
Start: 2021-02-06 | End: 2021-02-06

## 2021-02-06 RX ORDER — ACETAMINOPHEN 325 MG/1
650 TABLET ORAL EVERY 4 HOURS PRN
Status: DISCONTINUED | OUTPATIENT
Start: 2021-02-06 | End: 2021-02-08 | Stop reason: HOSPADM

## 2021-02-06 RX ORDER — MAGNESIUM SULFATE IN WATER 40 MG/ML
2 INJECTION, SOLUTION INTRAVENOUS CONTINUOUS
Status: DISCONTINUED | OUTPATIENT
Start: 2021-02-06 | End: 2021-02-08 | Stop reason: HOSPADM

## 2021-02-06 RX ORDER — MAGNESIUM SULFATE HEPTAHYDRATE 500 MG/ML
4 INJECTION, SOLUTION INTRAMUSCULAR; INTRAVENOUS
Status: DISCONTINUED | OUTPATIENT
Start: 2021-02-06 | End: 2021-02-08 | Stop reason: HOSPADM

## 2021-02-06 RX ORDER — MAGNESIUM SULFATE HEPTAHYDRATE 40 MG/ML
4 INJECTION, SOLUTION INTRAVENOUS
Status: DISCONTINUED | OUTPATIENT
Start: 2021-02-06 | End: 2021-02-08 | Stop reason: HOSPADM

## 2021-02-06 RX ORDER — ONDANSETRON 2 MG/ML
4 INJECTION INTRAMUSCULAR; INTRAVENOUS EVERY 6 HOURS PRN
Status: DISCONTINUED | OUTPATIENT
Start: 2021-02-06 | End: 2021-02-06

## 2021-02-06 RX ORDER — IBUPROFEN 800 MG/1
800 TABLET, FILM COATED ORAL EVERY 6 HOURS PRN
Status: DISCONTINUED | OUTPATIENT
Start: 2021-02-06 | End: 2021-02-08 | Stop reason: HOSPADM

## 2021-02-06 RX ORDER — NIFEDIPINE 10 MG/1
10 CAPSULE ORAL
Status: DISCONTINUED | OUTPATIENT
Start: 2021-02-06 | End: 2021-02-08 | Stop reason: HOSPADM

## 2021-02-06 RX ORDER — PRENATAL VIT/IRON FUM/FOLIC AC 27MG-0.8MG
1 TABLET ORAL DAILY
Status: DISCONTINUED | OUTPATIENT
Start: 2021-02-06 | End: 2021-02-08 | Stop reason: HOSPADM

## 2021-02-06 RX ORDER — OXYTOCIN/0.9 % SODIUM CHLORIDE 30/500 ML
100 PLASTIC BAG, INJECTION (ML) INTRAVENOUS CONTINUOUS
Status: DISCONTINUED | OUTPATIENT
Start: 2021-02-06 | End: 2021-02-08 | Stop reason: HOSPADM

## 2021-02-06 RX ORDER — SODIUM CHLORIDE, SODIUM LACTATE, POTASSIUM CHLORIDE, CALCIUM CHLORIDE 600; 310; 30; 20 MG/100ML; MG/100ML; MG/100ML; MG/100ML
10-125 INJECTION, SOLUTION INTRAVENOUS CONTINUOUS
Status: DISCONTINUED | OUTPATIENT
Start: 2021-02-06 | End: 2021-02-08 | Stop reason: HOSPADM

## 2021-02-06 RX ORDER — OXYCODONE AND ACETAMINOPHEN 5; 325 MG/1; MG/1
1 TABLET ORAL
Status: DISCONTINUED | OUTPATIENT
Start: 2021-02-06 | End: 2021-02-06

## 2021-02-06 RX ADMIN — LIDOCAINE HYDROCHLORIDE 20 ML: 10 INJECTION, SOLUTION INFILTRATION; PERINEURAL at 15:13

## 2021-02-06 RX ADMIN — SODIUM CHLORIDE, POTASSIUM CHLORIDE, SODIUM LACTATE AND CALCIUM CHLORIDE: 600; 310; 30; 20 INJECTION, SOLUTION INTRAVENOUS at 14:10

## 2021-02-06 RX ADMIN — MAGNESIUM SULFATE HEPTAHYDRATE 2 G/HR: 40 INJECTION, SOLUTION INTRAVENOUS at 17:07

## 2021-02-06 RX ADMIN — ENOXAPARIN SODIUM 40 MG: 40 INJECTION SUBCUTANEOUS at 20:00

## 2021-02-06 RX ADMIN — Medication 340 ML/HR: at 15:14

## 2021-02-06 RX ADMIN — LIDOCAINE HYDROCHLORIDE 1 MG: 10 INJECTION, SOLUTION EPIDURAL; INFILTRATION; INTRACAUDAL; PERINEURAL at 14:26

## 2021-02-06 RX ADMIN — FENTANYL CITRATE 50 MCG: 50 INJECTION INTRAMUSCULAR; INTRAVENOUS at 14:00

## 2021-02-06 RX ADMIN — IBUPROFEN 800 MG: 800 TABLET ORAL at 19:59

## 2021-02-06 RX ADMIN — SODIUM CHLORIDE, POTASSIUM CHLORIDE, SODIUM LACTATE AND CALCIUM CHLORIDE 25 ML/HR: 600; 310; 30; 20 INJECTION, SOLUTION INTRAVENOUS at 17:55

## 2021-02-06 RX ADMIN — ONDANSETRON 4 MG: 2 INJECTION INTRAMUSCULAR; INTRAVENOUS at 14:35

## 2021-02-06 RX ADMIN — SODIUM CHLORIDE, POTASSIUM CHLORIDE, SODIUM LACTATE AND CALCIUM CHLORIDE 1000 ML: 600; 310; 30; 20 INJECTION, SOLUTION INTRAVENOUS at 14:13

## 2021-02-06 RX ADMIN — MAGNESIUM SULFATE IN WATER 4 G: 40 INJECTION, SOLUTION INTRAVENOUS at 16:29

## 2021-02-06 RX ADMIN — FENTANYL CITRATE 50 MCG: 50 INJECTION INTRAMUSCULAR; INTRAVENOUS at 14:41

## 2021-02-06 NOTE — PROGRESS NOTES
ROM Plus is positive, pt marleny and getting uncomfortable, anesthesia paged to come for epidural.

## 2021-02-06 NOTE — PROGRESS NOTES
called an notified of elevated blood pressures.  Two blood pressures in the severe range since arrival.  Denies any symptoms of headache, blurred vision or epigastric pain.  Does have 1 beat of clonus bilaterally. Will plan to start magnesium sulfate

## 2021-02-06 NOTE — H&P
L&D History and Physical   2021  Jyoti New  [id number]      HPI: Jyoti New is a 20 year old  at 38w3d by 7w5d US who presents today for SROM and contractions. SROM at 1200, clear fluid, contractions starting around 1000  +FM.  No vaginal bleeding.  She denies fever, chills, SOB, chest pain, palpitations, N/V.  No concerns for headache, vision changes, RUQ or epigastric pain.  No dysuria, constipation or diarrhea.    Pregnancy s/f:  -Lapse in care, with transfer at 30wks  -Tobacco use  -Possible exposure? To methamphetamine use   -Occasional THC use in pregnancy  -Sister  of SIDS  -Hx anxiety and depression        OBHX:   OB History    Para Term  AB Living   3 0 0 0 2 0   SAB TAB Ectopic Multiple Live Births   0 0 0 0 0      # Outcome Date GA Lbr Sunday/2nd Weight Sex Delivery Anes PTL Lv   3 Current            2 AB  6w0d          1 AB  12w0d             Obstetric Comments   EDC 2021 per patient based on early pregnancy care received at [facility] (see CareEverywhere for dating ultrasound)  Parenting with Thomas LainezHX:   Past Medical History:   Diagnosis Date     Known health problems: none        SurgicalHX:   Past Surgical History:   Procedure Laterality Date     NO HISTORY OF SURGERY         Medications:   No current facility-administered medications on file prior to encounter.        Prenatal Vit-Fe Fumarate-FA (PRENATAL MULTIVITAMIN W/IRON) 27-0.8 MG tablet, Take 1 tablet by mouth daily       ibuprofen (ADVIL/MOTRIN) 600 MG tablet, Take 1 tablet (600 mg) by mouth every 6 hours as needed for moderate pain       nicotine (NICODERM CQ) 7 MG/24HR 24 hr patch, Place 1 patch onto the skin every 24 hours        Allergies:  No Known Allergies    FamilyHX:    Family History   Problem Relation Age of Onset     No Known Problems Mother      Diabetes Father      Emphysema Father      No Known Problems Sister      Abdominal Aortic Aneurysm Brother       Diabetes Maternal Grandmother      Myocardial Infarction Maternal Grandmother      Skin Cancer Maternal Grandfather      Diabetes Maternal Grandfather      Myocardial Infarction Maternal Grandfather      Cancer Paternal Grandmother      No Known Problems Paternal Grandfather      Sudden Infant Death Syndrome Sister      No Known Problems Half-Brother      No Known Problems Half-Brother      No Known Problems Brother      No Known Problems Brother      No Known Problems Brother        SocialHX:   Social History     Socioeconomic History     Marital status: Single     Spouse name: Not on file     Number of children: Not on file     Years of education: Not on file     Highest education level: Not on file   Occupational History     Not on file   Social Needs     Financial resource strain: Not on file     Food insecurity     Worry: Not on file     Inability: Not on file     Transportation needs     Medical: Not on file     Non-medical: Not on file   Tobacco Use     Smoking status: Current Every Day Smoker     Smokeless tobacco: Never Used   Substance and Sexual Activity     Alcohol use: Not Currently     Frequency: Never     Binge frequency: Never     Drug use: Never     Sexual activity: Not on file   Lifestyle     Physical activity     Days per week: Not on file     Minutes per session: Not on file     Stress: Not on file   Relationships     Social connections     Talks on phone: Not on file     Gets together: Not on file     Attends Shinto service: Not on file     Active member of club or organization: Not on file     Attends meetings of clubs or organizations: Not on file     Relationship status: Not on file     Intimate partner violence     Fear of current or ex partner: Not on file     Emotionally abused: Not on file     Physically abused: Not on file     Forced sexual activity: Not on file   Other Topics Concern     Not on file   Social History Narrative    11/2020  Lives in [place] with her parents and her  brother.  Jyoti smokes 1 PPD.  She is in a relationship with Thomas.  No indoor cats/kittens.  No concerns about domestic violence.  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       ROS: 10 point ROS negative other than above    Physical Exam:  Patient Vitals for the past 24 hrs:   BP Temp Temp src Pulse Resp   21 1421 (!) 141/83 98.2  F (36.8  C) Oral 75 16        General: AAOx3, appropriately interactive, NAD, appears generally well  CV: RRR, normal S1/S2, no m/r/g  Lungs: CTAB, labored breathing with contractions, no wheezes, rales, or rhonchi  Abdomen: soft, gravid, non-tender, EFW 7lbs; cephalic by BSUS  SVE:  4/80/-2 per RN, leaking clear fluid with bulging bag  Extremities: Non-tender without edema bilaterally in LE    FHT: 135 moderate variability, accels present, no decels   Sage: q2-4 minutes    Labs:    Lab Results   Component Value Date    ABO A 2021    RH Pos 2021    AS Neg 2021    HEPBANG Nonreactive 2021    HGB 12.2 2021       GBS Status:   Lab Results   Component Value Date    GBS Negative 2021       Lab Results   Component Value Date    PAP NIL 2021       CBC and Type&Screen pending    Assessment: 20 year old  at 38w3d by 7w5d US, here for labor, SROM     Pregnancy c/b:  -Lapse in care, with transfer at 30wks  -Tobacco use, 3 pakcs q 3 days  -Possible exposure? To methamphetamine use. Recent UDS neg  -Occasional THC use in pregnancy, currently denying any use  -Sister  of SIDS  -Hx anxiety and depression with hx SI, cutting behavior and inpatient treatment, previously on Zoloft  -gHTN- Mildly elevated BP in triage 2 days ago, and on admission today, now meeting criteria for gHTN. HELLP labs ordered, no s/s pre-e/. Continue to closely monitor Bps.    Plan:  -Admit to labor and delivery  -FWB:  Cat 1 tracing, CFM  -GBS neg: Antibiotics not ordered  -Pain control: Epidural vs IV Fentanyl, patient undecided  -Diet as  tolerated  -Expectant management at this time  -Peds: Dr. Micheal Borden,   OB/GYN  February 6, 2021

## 2021-02-06 NOTE — L&D DELIVERY NOTE
OB Vaginal Delivery Note    Jyoti New MRN# [id number]   Age: 20 year old YOB: 2000       GA: 38w3d  GP:   Labor Complications: None   EBL: 75  mL  Delivery QBL:    Delivery Type: Vaginal, Spontaneous   ROM to Delivery Time: (Delivered) Hours: 3 Minutes: 8  [place] Weight: 3.745 kg (8 lb 4.1 oz)    1 Minute 5 Minute 10 Minute   Apgar Totals: 9   9        TOPHERHAYDEN NASHE     Delivery Details:  Jyoti New, a 20 year old  female delivered a viable infant with apgars of 9  and 9 . Patient was fully dilated and pushing after   hours   minutes in active labor. Delivery was via vaginal, spontaneous  to a sterile field under none  anesthesia. Infant delivered in vertex  left  occiput  anterior  position. Anterior and posterior shoulders delivered without difficulty. The cord was clamped, cut twice and 3 vessels  were noted. Cord blood was obtained in routine fashion with the following disposition: lab .      Cord complications: none   Placenta delivered at 2021  3:12 PM . Placental disposition was Hospital disposal . Fundal massage performed and fundus found to be firm.     Episiotomy: none    Perineum, vagina, cervix were inspected, and the following lacerations were noted:   Perineal lacerations: none     labial laceration: bilateral           Any lacerations were repaired in the usual fashion using 2-0 vicryl. Left labial laceration hemostatic, and not requiring repair. Right labial laceration with brisk bleeding, which was repaired in figure of eight fashion and adequate hemostasis. Remainder of right labial laceration reapproximated with interrupted suture.     Excellent hemostasis was noted. Unable to adequately obtain QBL secondary to significant amount of amniotic fluid in bag. Needle count correct. Infant and patient in delivery room in good and stable condition.        Shaq, Female-Jyoti [[id number]]    Labor Event Times    Dilation complete date:  "21 Complete time:  3:02 PM   Start pushing date/time: 2021 1506      Labor Length    2nd Stage (hrs): 0 (min): 6   3rd Stage (hrs): 0 (min): 4      Labor Events     labor?: No     Rupture date/time: 21 1200   Fluid color: Clear  Fluid odor: Normal     1:1 continuous labor support provided by?: RN       Delivery/Placenta Date and Time    Delivery Date: 21 Delivery Time:  3:08 PM   Placenta Date/Time: 2021  3:12 PM  Oxytocin given at the time of delivery: after delivery of placenta     Vaginal Counts     Initial count performed by 2 team members:  Two Team Members   Dr.Kauffman Yadi Smith       [employer] [employer]   Initial counts 1 1 5    Added to count       Final counts       Placed during labor Accounted for at the end of labor   No    No    No            Apgars    Living status: Living   1 Minute 5 Minute 10 Minute 15 Minute 20 Minute   Skin color: 1  1       Heart rate: 2  2       Reflex irritability: 2  2       Muscle tone: 2  2       Respiratory effort: 2  2       Total: 9  9       Apgars assigned by: YADI SMITH RN     Cord    Vessels: 3 Vessels Complications: None   Cord Blood Disposition: Lab Gases Sent?: No       Resuscitation    Methods: None  Output in Delivery Room: Stool      Measurements    Weight: 8 lb 4.1 oz Length: 1' 8\"   Head circumference: 13.3 cm Chest circumference: 34.3 cm      Skin to Skin and Feeding Plan    Skin to skin initiation date/time: 1841    Skin to skin with: Mother  Skin to skin end date/time:     How do you plan to feed your baby: Breastfeeding     Labor Events and Shoulder Dystocia    Fetal Tracing Prior to Delivery: Category 2  Shoulder dystocia present?: Neg     Delivery (Maternal) (Provider to Complete) ([id number])    Episiotomy: None  Perineal lacerations: None    Labial laceration: bilateral Repaired?: Yes   Est. blood loss (mL): 75     Blood Loss  Mother: Jyoti New #[id number]   Start of " Mother's Information    IO Blood Loss  02/06/21 0308 - 02/07/21 1508    EBL (mL) Hospital Encounter 75 mL    Delivery QBL (mL) Hospital Encounter 475 mL    Total  550 mL         End of Mother's Information  Mother: Jyoti New #[id number]          Delivery - Provider to Complete ([id number])    Delivering clinician: Dee Borden DO  Delivery Type (Choose the 1 that will go to the Birth History): Vaginal, Spontaneous                   Other personnel:  Provider Role   Diana Shin MD Baby Doctor                Placenta    Delayed Cord Clamping: Done  Date/Time: 2/6/2021  3:12 PM  Removal: Spontaneous  Disposition: Hospital disposal           Anesthesia    Method: None                Presentation and Position    Presentation: Vertex    Position: Left Occiput Anterior                 Dee Borden DO

## 2021-02-06 NOTE — PROGRESS NOTES
Pt covid lab came back after delivery and patient is positive.  Discussed with patient.   is aware and had put in orders.  Chest x-ray done and labs collected. Rodriguez catheter placed. Urine collected off of the catheter.

## 2021-02-06 NOTE — PROGRESS NOTES
OBGYN Note    Notified of severe range BP x2 (not in a row) and magnesium sulfate ordered for presumed pre-eclampsia with SF (BP). Patient otherwise asymptomatic, denies headache, vision changes or abdominal pain. 1 beat clonus per RN PP. No other concerning findings. Pr/Cr pending, remaining labs stable. Continue to follow Bps closely, treat sustained severe range Bps. Closely monitor I+O.    Addendum 174:  Notified +COVID screen, now resulting from admission. Patient with recent URI symptoms and negative COVID testing x2 (, ). URI symptoms now resolved, feeling well. No f/c/cp, sob. COVID OB order set initiated, including labs and imaging as indicated. Will monitor lab results and continue management as appropriate. Continue mag sulfate at this time, awaiting Pr/Cr ratio. Patient will need lovenox as well, will dose based on ddimer results per algorithm.  [place] provider notified per RN. Plan and orders discussed with nursing staff.    Addendum 191:  Consulted with M service (Dr. Mckinnon) regarding patient care/clarify Lovenox based on algorithm. Agreed with current course of management, continue mag sulfate. Recommended standard prophylactic Lovenox dosing (40mg daily); not by DDimer of 3.9 as this would be more for acute illness with COVID. Patent to continue lovenox until 6 weeks PP. RN and patient updated with plan of care.    Dee Borden,

## 2021-02-06 NOTE — PROGRESS NOTES
S: Triage Arrival  B: Jyoti is a 20 y.o.  @ 38w 3d who presents with complaint of SROM.  A: EFM applied. FHT's115 with moderate variability, accels present, no decels noted on strip. Contractions present. ROM Plus sent to lab.  R: Will notify MD to obtain further orders.

## 2021-02-06 NOTE — PROGRESS NOTES
S: Delivery  B: spontaneous Labor,  @ 15kiz3nool gestation, GBS negative .  A: Patient delivered Vaginal at 1508 with Dr. cameron in attendance. Baby delivered and placed on mother's low abdomen for delayed cord clamping where baby was dried and stimulated. After cord clamped and cut, baby was placed skin to skin on mother's chest within 5 minutes following delivery . Apgars 9/9. Placenta was delivered @ 1512 followed by administration of oxytocin. Bonding initiated with mom and baby. Educated mother on importance of exclusive breast feeding, expected feeding readiness cues and encouraged her to observe for feeding cues. Mother informed that breast feeding assistance would be provided. See flowsheet for VS and PP checks. Labor care plan goals met.  R: Expect routine postpartum care. Anticipate first feeding within the hour.

## 2021-02-07 ENCOUNTER — APPOINTMENT (OUTPATIENT)
Dept: ULTRASOUND IMAGING | Facility: CLINIC | Age: 21
End: 2021-02-07
Attending: OBSTETRICS & GYNECOLOGY
Payer: COMMERCIAL

## 2021-02-07 LAB
ANION GAP SERPL CALCULATED.3IONS-SCNC: 5 MMOL/L (ref 3–14)
BUN SERPL-MCNC: 8 MG/DL (ref 7–30)
CALCIUM SERPL-MCNC: 7.4 MG/DL (ref 8.5–10.1)
CHLORIDE SERPL-SCNC: 108 MMOL/L (ref 94–109)
CO2 SERPL-SCNC: 24 MMOL/L (ref 20–32)
CREAT SERPL-MCNC: 0.59 MG/DL (ref 0.52–1.04)
CRP SERPL-MCNC: 18.1 MG/L (ref 0–8)
D DIMER PPP FEU-MCNC: 1.4 UG/ML FEU (ref 0–0.5)
ERYTHROCYTE [DISTWIDTH] IN BLOOD BY AUTOMATED COUNT: 12.6 % (ref 10–15)
FIBRINOGEN PPP-MCNC: 413 MG/DL (ref 200–420)
GFR SERPL CREATININE-BSD FRML MDRD: >90 ML/MIN/{1.73_M2}
GLUCOSE SERPL-MCNC: 92 MG/DL (ref 70–99)
HCT VFR BLD AUTO: 28.1 % (ref 35–47)
HGB BLD-MCNC: 8.1 G/DL (ref 11.7–15.7)
HGB BLD-MCNC: 8.4 G/DL (ref 11.7–15.7)
HGB BLD-MCNC: 9.4 G/DL (ref 11.7–15.7)
MCH RBC QN AUTO: 30.4 PG (ref 26.5–33)
MCHC RBC AUTO-ENTMCNC: 33.5 G/DL (ref 31.5–36.5)
MCV RBC AUTO: 91 FL (ref 78–100)
PLATELET # BLD AUTO: 224 10E9/L (ref 150–450)
POTASSIUM SERPL-SCNC: 4.2 MMOL/L (ref 3.4–5.3)
RBC # BLD AUTO: 3.09 10E12/L (ref 3.8–5.2)
SODIUM SERPL-SCNC: 137 MMOL/L (ref 133–144)
T PALLIDUM AB SER QL: NONREACTIVE
WBC # BLD AUTO: 13.9 10E9/L (ref 4–11)

## 2021-02-07 PROCEDURE — 85027 COMPLETE CBC AUTOMATED: CPT | Performed by: OBSTETRICS & GYNECOLOGY

## 2021-02-07 PROCEDURE — 36415 COLL VENOUS BLD VENIPUNCTURE: CPT | Performed by: OBSTETRICS & GYNECOLOGY

## 2021-02-07 PROCEDURE — 86140 C-REACTIVE PROTEIN: CPT | Performed by: OBSTETRICS & GYNECOLOGY

## 2021-02-07 PROCEDURE — 120N000001 HC R&B MED SURG/OB

## 2021-02-07 PROCEDURE — 85379 FIBRIN DEGRADATION QUANT: CPT | Performed by: OBSTETRICS & GYNECOLOGY

## 2021-02-07 PROCEDURE — 85384 FIBRINOGEN ACTIVITY: CPT | Performed by: OBSTETRICS & GYNECOLOGY

## 2021-02-07 PROCEDURE — 93970 EXTREMITY STUDY: CPT

## 2021-02-07 PROCEDURE — 85018 HEMOGLOBIN: CPT | Performed by: OBSTETRICS & GYNECOLOGY

## 2021-02-07 PROCEDURE — 80048 BASIC METABOLIC PNL TOTAL CA: CPT | Performed by: OBSTETRICS & GYNECOLOGY

## 2021-02-07 PROCEDURE — 250N000013 HC RX MED GY IP 250 OP 250 PS 637: Performed by: OBSTETRICS & GYNECOLOGY

## 2021-02-07 PROCEDURE — 250N000011 HC RX IP 250 OP 636: Performed by: OBSTETRICS & GYNECOLOGY

## 2021-02-07 RX ORDER — FERROUS SULFATE 325(65) MG
325 TABLET ORAL DAILY
Status: DISCONTINUED | OUTPATIENT
Start: 2021-02-07 | End: 2021-02-08 | Stop reason: HOSPADM

## 2021-02-07 RX ADMIN — PRENATAL VIT W/ FE FUMARATE-FA TAB 27-0.8 MG 1 TABLET: 27-0.8 TAB at 08:41

## 2021-02-07 RX ADMIN — SENNOSIDES AND DOCUSATE SODIUM 1 TABLET: 8.6; 5 TABLET ORAL at 08:42

## 2021-02-07 RX ADMIN — FERROUS SULFATE TAB 325 MG (65 MG ELEMENTAL FE) 325 MG: 325 (65 FE) TAB at 16:41

## 2021-02-07 RX ADMIN — ACETAMINOPHEN 650 MG: 325 TABLET, FILM COATED ORAL at 08:41

## 2021-02-07 RX ADMIN — ACETAMINOPHEN 650 MG: 325 TABLET, FILM COATED ORAL at 03:22

## 2021-02-07 RX ADMIN — MAGNESIUM SULFATE HEPTAHYDRATE 2 G/HR: 40 INJECTION, SOLUTION INTRAVENOUS at 03:15

## 2021-02-07 RX ADMIN — ACETAMINOPHEN 650 MG: 325 TABLET, FILM COATED ORAL at 18:38

## 2021-02-07 RX ADMIN — ACETAMINOPHEN 650 MG: 325 TABLET, FILM COATED ORAL at 13:06

## 2021-02-07 RX ADMIN — MAGNESIUM SULFATE HEPTAHYDRATE 2 G/HR: 40 INJECTION, SOLUTION INTRAVENOUS at 13:36

## 2021-02-07 RX ADMIN — IBUPROFEN 800 MG: 800 TABLET ORAL at 08:41

## 2021-02-07 RX ADMIN — IBUPROFEN 800 MG: 800 TABLET ORAL at 16:41

## 2021-02-07 RX ADMIN — ENOXAPARIN SODIUM 40 MG: 40 INJECTION SUBCUTANEOUS at 19:38

## 2021-02-07 NOTE — PLAN OF CARE
S: Shift review   B:Jyoti is a  who delivered vaginally on 21 at 1508.  Pt is Covid +, asymptomatic and on Magnesium Sulfate for Preeclampsia at 2gm/hr with no symptoms.  A: VSS, patient is independent with mobility, pain well controlled with p.o. pain meds. Handles baby with confidence. Pt does need encouragement to nurse every 2-3 hours  R: Continue with routine PP care

## 2021-02-07 NOTE — CONSULTS
Care Transitions Note:    CTS staff received notification from [facility] RN informing that a cord tissue segment was sent for drug detection panel based on rapid delivery and Pt smoked during pregnancy criteria.    CTS to follow for results.    GIFTY Vergara  [facility] [phone]   [facility]  [phone]

## 2021-02-07 NOTE — PROGRESS NOTES
and notified that the labs are back.  She has been in contact with [facility] for plan of care for this patient.

## 2021-02-07 NOTE — PROGRESS NOTES
Pt transferred from room 333 to room 334 due to covid positive test result.  Pt eating supper. Denies headache, upper right quadrant pain,  Or changes in vision. no swelling noted. Normal reflexes.   Magnesium sulfate running.

## 2021-02-07 NOTE — PROGRESS NOTES
Postpartum Progress Note  Jyoti New  [id number]    Subjective:   Patient is feeling well.  Lochia similar to menses.  Eating and drinking regular food without nausea/emesis.  Bedrest without difficulty, lightheadedness. Pain is adequately controlled on oral pain medications.  Breastfeeding without concerns/questions.  Voiding without difficulty. No headaches, vision changes or upper abdominal pain. No fevers/chills, cp/sob/n/v. +b/l LE calf tenderness/cramping, R>L. Has been drinking pickle juice to help.    Objective:  /73   Pulse 78   Temp 98.2  F (36.8  C) (Oral)   Resp 16   SpO2 98%   Breastfeeding Unknown     I/O last 3 completed shifts:  In: 2976.62 [P.O.:2000; I.V.:976.62]  Out: 4350 [Urine:3800; Blood:550]    General: appropriately interactive, NAD, comfortable  Heart: regular rate, extremities warm and well-perfused  Lungs: breathing comfortably, symmetric chest rise  Abdomen: Soft, non-tender, non-distended; fundus firm and below umbilicus  Extremities: Without edema in BLE, +2/4 DTRs BL LE, no clonus. +Right calf tenderness to palpation    Labs:  Hemoglobin   Date Value Ref Range Status   2021 9.4 (L) 11.7 - 15.7 g/dL Final     Hemoglobin   Date Value Ref Range Status   2021 9.4 (L) 11.7 - 15.7 g/dL Final   2021 13.0 11.7 - 15.7 g/dL Final     Results for orders placed or performed during the hospital encounter of 20   XR Chest Port 1 View    Narrative    CHEST ONE VIEW  2021 5:20 PM     HISTORY: COVID +, postpartum.    COMPARISON: 2021      Impression    IMPRESSION: Minimal atelectasis and or infiltrate at both lung bases.  Lungs otherwise clear.    BAUTISTA SOARES MD        Assessment/Plan: 20 year old  who is PPD#1 s/p , with Pre-eclampsia with SF (BP), COVID+.  Currently stable and doing well.    - Pre-E with SF (BP).  Continue mag sulfate until 24hrs after initiation. Bps now normotensive. No further s/s SF. Excellent UO.   -COVID +,  asymptomatic. Isolation room,  precautions reviewed, baby in room. Continue Lovenox with plan to discharge home for 6 weeks. Labs stable.   -+RLE calf tenderness, doppler ordered r/o DVT  - Continue routine post-partum cares.     - Acute blood loss anemia, likely underestimation of QBL at delivery with bleeding from labial tear. Repeat Hgb in 6 hours given addition of Lovenox. Patient asymptomatic, continue iron supplement. Hgb 13 > 9.4  - Baby: Doing well, breastfeeding,   - Contraception: Undecided  - Rh positive  - Rubella immune    Dispo: d/c to home likely tomorrow    Dee Borden DO  2021, 9:33 AM

## 2021-02-08 VITALS
TEMPERATURE: 98.2 F | RESPIRATION RATE: 16 BRPM | OXYGEN SATURATION: 99 % | HEART RATE: 81 BPM | DIASTOLIC BLOOD PRESSURE: 65 MMHG | SYSTOLIC BLOOD PRESSURE: 135 MMHG

## 2021-02-08 LAB
ANION GAP SERPL CALCULATED.3IONS-SCNC: 3 MMOL/L (ref 3–14)
BUN SERPL-MCNC: 8 MG/DL (ref 7–30)
CALCIUM SERPL-MCNC: 8 MG/DL (ref 8.5–10.1)
CHLORIDE SERPL-SCNC: 111 MMOL/L (ref 94–109)
CO2 SERPL-SCNC: 25 MMOL/L (ref 20–32)
CREAT SERPL-MCNC: 0.56 MG/DL (ref 0.52–1.04)
CRP SERPL-MCNC: 14.7 MG/L (ref 0–8)
D DIMER PPP FEU-MCNC: 0.7 UG/ML FEU (ref 0–0.5)
ERYTHROCYTE [DISTWIDTH] IN BLOOD BY AUTOMATED COUNT: 12.9 % (ref 10–15)
FIBRINOGEN PPP-MCNC: 454 MG/DL (ref 200–420)
GFR SERPL CREATININE-BSD FRML MDRD: >90 ML/MIN/{1.73_M2}
GLUCOSE SERPL-MCNC: 78 MG/DL (ref 70–99)
HCT VFR BLD AUTO: 25 % (ref 35–47)
HGB BLD-MCNC: 8.2 G/DL (ref 11.7–15.7)
MCH RBC QN AUTO: 30.5 PG (ref 26.5–33)
MCHC RBC AUTO-ENTMCNC: 32.8 G/DL (ref 31.5–36.5)
MCV RBC AUTO: 93 FL (ref 78–100)
PLATELET # BLD AUTO: 214 10E9/L (ref 150–450)
POTASSIUM SERPL-SCNC: 4.3 MMOL/L (ref 3.4–5.3)
RBC # BLD AUTO: 2.69 10E12/L (ref 3.8–5.2)
SODIUM SERPL-SCNC: 139 MMOL/L (ref 133–144)
WBC # BLD AUTO: 11 10E9/L (ref 4–11)

## 2021-02-08 PROCEDURE — 85384 FIBRINOGEN ACTIVITY: CPT | Performed by: OBSTETRICS & GYNECOLOGY

## 2021-02-08 PROCEDURE — 36415 COLL VENOUS BLD VENIPUNCTURE: CPT | Performed by: OBSTETRICS & GYNECOLOGY

## 2021-02-08 PROCEDURE — 85379 FIBRIN DEGRADATION QUANT: CPT | Performed by: OBSTETRICS & GYNECOLOGY

## 2021-02-08 PROCEDURE — 85027 COMPLETE CBC AUTOMATED: CPT | Performed by: OBSTETRICS & GYNECOLOGY

## 2021-02-08 PROCEDURE — 86140 C-REACTIVE PROTEIN: CPT | Performed by: OBSTETRICS & GYNECOLOGY

## 2021-02-08 PROCEDURE — 99238 HOSP IP/OBS DSCHRG MGMT 30/<: CPT | Performed by: FAMILY MEDICINE

## 2021-02-08 PROCEDURE — 80048 BASIC METABOLIC PNL TOTAL CA: CPT | Performed by: OBSTETRICS & GYNECOLOGY

## 2021-02-08 PROCEDURE — 250N000013 HC RX MED GY IP 250 OP 250 PS 637: Performed by: OBSTETRICS & GYNECOLOGY

## 2021-02-08 RX ORDER — ACETAMINOPHEN 325 MG/1
650 TABLET ORAL EVERY 4 HOURS PRN
Qty: 30 TABLET | Refills: 1 | Status: SHIPPED | OUTPATIENT
Start: 2021-02-08 | End: 2022-05-17

## 2021-02-08 RX ORDER — AMOXICILLIN 250 MG
1 CAPSULE ORAL 2 TIMES DAILY PRN
Qty: 60 TABLET | Refills: 0 | Status: SHIPPED | OUTPATIENT
Start: 2021-02-08 | End: 2021-04-08

## 2021-02-08 RX ORDER — FERROUS SULFATE 325(65) MG
325 TABLET ORAL DAILY
Qty: 30 TABLET | Refills: 1 | Status: SHIPPED | OUTPATIENT
Start: 2021-02-08 | End: 2022-07-05

## 2021-02-08 RX ORDER — IBUPROFEN 600 MG/1
600 TABLET, FILM COATED ORAL EVERY 6 HOURS PRN
Qty: 30 TABLET | Refills: 0 | Status: SHIPPED | OUTPATIENT
Start: 2021-02-08 | End: 2022-05-17

## 2021-02-08 RX ORDER — PRENATAL VIT/IRON FUM/FOLIC AC 27MG-0.8MG
1 TABLET ORAL DAILY
Qty: 90 TABLET | Refills: 1 | Status: SHIPPED | OUTPATIENT
Start: 2021-02-08 | End: 2022-05-17

## 2021-02-08 RX ORDER — MODIFIED LANOLIN
OINTMENT (GRAM) TOPICAL
Qty: 14 G | Refills: 0 | Status: SHIPPED | OUTPATIENT
Start: 2021-02-08 | End: 2021-04-08

## 2021-02-08 RX ORDER — LANOLIN 100 %
OINTMENT (GRAM) TOPICAL EVERY 4 HOURS PRN
Status: DISCONTINUED | OUTPATIENT
Start: 2021-02-08 | End: 2021-02-08 | Stop reason: HOSPADM

## 2021-02-08 RX ADMIN — PRENATAL VIT W/ FE FUMARATE-FA TAB 27-0.8 MG 1 TABLET: 27-0.8 TAB at 08:16

## 2021-02-08 RX ADMIN — FERROUS SULFATE TAB 325 MG (65 MG ELEMENTAL FE) 325 MG: 325 (65 FE) TAB at 08:16

## 2021-02-08 RX ADMIN — ACETAMINOPHEN 650 MG: 325 TABLET, FILM COATED ORAL at 02:34

## 2021-02-08 NOTE — PROGRESS NOTES
Pt & SO were given education on Lovenox injection with last eves dose. And verbal instructions were gone over this am with discharge instructions. Pt & SO have no questions at this time, preparing for discharge to home.

## 2021-02-08 NOTE — DISCHARGE INSTRUCTIONS
[facility] Discharge Instructions     Discharge disposition:  Discharged to home       Diet:  Regular       Activity No sex for 6 week(s)       Follow-up: Follow up with Dr. Burton for blood pressure check on 2/11/21 at 10:40 am and for full postpartum visit in 6 weeks       Additional instructions: [facility] staff is available 24 hours 7 days for questions about you or your baby.  Please don't hesitate to call with any concerns.        Additional Patient Information:  Enjoy beautiful little Lilia!    Postpartum Vaginal Delivery Instructions    Activity       Ask family and friends for help when you need it.    Do not place anything in your vagina for 6 weeks.    You are not restricted on other activities, but take it easy for a few weeks to allow your body to recover from delivery.  You are able to do any activities you feel up to that point.    No driving until you have stopped taking your pain medications (usually two weeks after delivery).     Call your health care provider if you have any of these symptoms:       Increased pain, swelling, redness, or fluid around your stiches from an episiotomy or perineal tear.    A fever above 100.4 F (38 C) with or without chills when placing a thermometer under your tongue.    You soak a sanitary pad with blood within 1 hour, or you see blood clots larger than a golf ball.    Bleeding that lasts more than 6 weeks.    Vaginal discharge that smells bad.    Severe pain, cramping or tenderness in your lower belly area.    A need to urinate more frequently (use the toilet more often), more urgently (use the toilet very quickly), or it burns when you urinate.    Nausea and vomiting.    Redness, swelling or pain around a vein in your leg.    Problems breastfeeding or a red or painful area on your breast.    Chest pain and cough or are gasping for air.    Problems coping with sadness, anxiety, or depression.  If you have any concerns about  hurting yourself or the baby, call your provider immediately.     You have questions or concerns after you return home.     Keep your hands clean:  Always wash your hands before touching your perineal area and stitches.  This helps reduce your risk of infection.  If your hands aren't dirty, you may use an alcohol hand-rub to clean your hands. Keep your nails clean and short.

## 2021-02-08 NOTE — PROGRESS NOTES
S: HGB 8.1  B:  Prev Hgb 8.4, Hgb ordered every 6 hours  A: Dr. Mcnamara notified.  R: Orders to repeat at 0600

## 2021-02-08 NOTE — DISCHARGE SUMMARY
[facility] Discharge Summary    Jyoti New MRN# [id number]   Age: 20 year old YOB: 2000     Date of Admission:  2021  Date of Discharge::  2021  Admitting Physician:  Dee Borden DO  Discharge Physician:  Diana Shin MD     Home clinic: [facility]          Admission Diagnoses:   Supervision of high risk pregnancy in third trimester  Preeclampsia          Discharge Diagnosis:   Normal spontaneous vaginal delivery  Intrauterine pregnancy at 38 3/7 weeks gestation  COVID positive lab test - asymptomatic  Acute blood loss anemia  Preeclampsia  Postpartum care of lactating woman          Procedures:   Procedure(s): No additional procedures performed              Medications Prior to Admission:     Medications Prior to Admission   Medication Sig Dispense Refill Last Dose     [DISCONTINUED] ibuprofen (ADVIL/MOTRIN) 600 MG tablet Take 1 tablet (600 mg) by mouth every 6 hours as needed for moderate pain 60 tablet 0 More than a month at Unknown time     [DISCONTINUED] nicotine (NICODERM CQ) 7 MG/24HR 24 hr patch Place 1 patch onto the skin every 24 hours 30 patch 1 More than a month at Unknown time     [DISCONTINUED] Prenatal Vit-Fe Fumarate-FA (PRENATAL MULTIVITAMIN W/IRON) 27-0.8 MG tablet Take 1 tablet by mouth daily   Past Month at Unknown time             Discharge Medications:     Current Discharge Medication List      START taking these medications    Details   acetaminophen (TYLENOL) 325 MG tablet Take 2 tablets (650 mg) by mouth every 4 hours as needed for mild pain or fever (greater than or equal to 38  C /100.4  F (oral) or 38.5  C/ 101.4  F (core).)  Qty: 30 tablet, Refills: 1    Associated Diagnoses:  (normal spontaneous vaginal delivery)      enoxaparin ANTICOAGULANT (LOVENOX) 40 MG/0.4ML syringe Inject 0.4 mLs (40 mg) Subcutaneous every 24 hours  Qty: 18 mL, Refills: 0    Associated Diagnoses: Lab test positive for detection  of COVID-19 virus      ferrous sulfate (FEROSUL) 325 (65 Fe) MG tablet Take 1 tablet (325 mg) by mouth daily  Qty: 30 tablet, Refills: 1    Associated Diagnoses: Anemia due to blood loss, acute      lanolin ointment Use as needed for dry, cracked or sore nipples. No need to wash off.  Qty: 14 g, Refills: 0    Associated Diagnoses: Postpartum care and examination of lactating mother      senna-docusate (SENOKOT-S/PERICOLACE) 8.6-50 MG tablet Take 1 tablet by mouth 2 times daily as needed for constipation  Qty: 60 tablet, Refills: 0    Associated Diagnoses:  (normal spontaneous vaginal delivery)         CONTINUE these medications which have CHANGED    Details   ibuprofen (ADVIL/MOTRIN) 600 MG tablet Take 1 tablet (600 mg) by mouth every 6 hours as needed for moderate pain  Qty: 30 tablet, Refills: 0    Associated Diagnoses:  (normal spontaneous vaginal delivery)      Prenatal Vit-Fe Fumarate-FA (PRENATAL MULTIVITAMIN W/IRON) 27-0.8 MG tablet Take 1 tablet by mouth daily  Qty: 90 tablet, Refills: 1    Associated Diagnoses:  (normal spontaneous vaginal delivery)         STOP taking these medications       nicotine (NICODERM CQ) 7 MG/24HR 24 hr patch Comments:   Reason for Stopping:                     Consultations:   No consultations were requested during this admission          Brief History of Labor:   Patient was admitted with spontaneous labor.  She went on to deliver a healthy baby girl with  Complications as listed below. Please see delivery summary for full details.              Hospital Course:   Her admission COVID test was positive, so she was placed in isolation for the remainder of her stay. She remained asymptomatic, and her CXR was negative, but was started on Lovenox prophylactically. She had some right calf tenderness and an elevated d-dimer so had ultrasound to check for DVT and these were negative. She did have preeclampsia as measured by a couple BPs in the 150s/80s and was started on  Magnesium sulfate. Her labs were stable/normal and there were no complications from her preeclampsia. Her BP normalized without further treatment.   She remained on magnesium sulfate for 24 hours post delivery.     She did have a significant drop in her hemoglobin attributed to underestimated EBL at time of delivery. She had a right labial tear that was bleeding briskly while being repaired. She remained relatively asymptomatic from the anemia and was started on iron supplement.      The patient's postpartum hospital course was otherwise unremarkable.  On discharge, her pain was well controlled. Vaginal bleeding is similar to peak menstrual flow.  Voiding without difficulty.  Ambulating well and tolerating a normal diet.  No fever.  Breastfeeding well.  Infant is stable.  She was discharged on post-partum day #2.    On the day of discharge her exam is as follows:    Vitals:    02/07/21 0800 02/07/21 1300 02/07/21 1641 02/07/21 2320   BP: 123/73 129/62 130/68 132/74   Pulse: 78 85 71 74   Resp:  16 16 16   Temp: 98.2  F (36.8  C) 98.4  F (36.9  C) 98.2  F (36.8  C) 98.2  F (36.8  C)   TempSrc: Oral Oral Oral Oral   SpO2: 98%   99%      Vitals noted.  Patient alert, oriented, and in no acute distress. CV:  RRR without murmur. Respiratory:  Lungs clear to auscultation bilaterally. Abdomen:  Soft, tender with palpation of the uterine fundus which is firm and below the level of the umbilicus, nondistended with good bowel sounds and no masses or hepatosplenomegaly.  Extremities warm and dry without significant edema.      Post-partum hemoglobin:   Hemoglobin   Date Value Ref Range Status   02/08/2021 8.2 (L) 11.7 - 15.7 g/dL Final     Component      Latest Ref Rng & Units 2/6/2021 2/7/2021 2/7/2021            7:26 AM  3:36 PM   WBC      4.0 - 11.0 10e9/L 13.1 (H) 13.9 (H)    RBC Count      3.8 - 5.2 10e12/L 4.25 3.09 (L)    Hemoglobin      11.7 - 15.7 g/dL 13.0 9.4 (L) 8.4 (L)   Hematocrit      35.0 - 47.0 % 38.3 28.1 (L)     MCV      78 - 100 fl 90 91    MCH      26.5 - 33.0 pg 30.6 30.4    MCHC      31.5 - 36.5 g/dL 33.9 33.5    RDW      10.0 - 15.0 % 12.5 12.6    Platelet Count      150 - 450 10e9/L 240 224    Diff Method       Automated Method     % Neutrophils      % 78.3     % Lymphocytes      % 13.0     % Monocytes      % 7.2     % Eosinophils      % 0.5     % Basophils      % 0.5     % Immature Granulocytes      % 0.5     Nucleated RBCs      0 /100 0     Absolute Neutrophil      1.6 - 8.3 10e9/L 10.3 (H)     Absolute Lymphocytes      0.8 - 5.3 10e9/L 1.7     Absolute Monocytes      0.0 - 1.3 10e9/L 0.9     Absolute Basophils      0.0 - 0.2 10e9/L 0.1     Abs Immature Granulocytes      0 - 0.4 10e9/L 0.1     Absolute Nucleated RBC       0.0     Sodium      133 - 144 mmol/L  137    Potassium      3.4 - 5.3 mmol/L  4.2    Chloride      94 - 109 mmol/L  108    Carbon Dioxide      20 - 32 mmol/L  24    Anion Gap      3 - 14 mmol/L  5    Glucose      70 - 99 mg/dL  92    Urea Nitrogen      7 - 30 mg/dL  8    Creatinine      0.52 - 1.04 mg/dL  0.59    GFR Estimate      >60 mL/min/1.73:m2  >90    GFR Estimate If Black      >60 mL/min/1.73:m2  >90    Calcium      8.5 - 10.1 mg/dL  7.4 (L)    SARS-CoV-2 Virus Specimen Source       Nasopharyngeal     SARS-CoV-2 PCR Result       POSITIVE (AA)     SARS-CoV-2 PCR Comment       (Note)     Fibrinogen      200 - 420 mg/dL 459 (H) 413    D-Dimer      0.0 - 0.50 ug/ml FEU 3.9 (H) 1.4 (H)    CRP Inflammation      0.0 - 8.0 mg/L <2.9 18.1 (H)      Component      Latest Ref Rng & Units 2/7/2021 2/8/2021          10:10 PM    WBC      4.0 - 11.0 10e9/L  11.0   RBC Count      3.8 - 5.2 10e12/L  2.69 (L)   Hemoglobin      11.7 - 15.7 g/dL 8.1 (L) 8.2 (L)   Hematocrit      35.0 - 47.0 %  25.0 (L)   MCV      78 - 100 fl  93   MCH      26.5 - 33.0 pg  30.5   MCHC      31.5 - 36.5 g/dL  32.8   RDW      10.0 - 15.0 %  12.9   Platelet Count      150 - 450 10e9/L  214   Sodium      133 - 144 mmol/L  139    Potassium      3.4 - 5.3 mmol/L  4.3   Chloride      94 - 109 mmol/L  111 (H)   Carbon Dioxide      20 - 32 mmol/L  25   Anion Gap      3 - 14 mmol/L  3   Glucose      70 - 99 mg/dL  78   Urea Nitrogen      7 - 30 mg/dL  8   Creatinine      0.52 - 1.04 mg/dL  0.56   GFR Estimate      >60 mL/min/1.73:m2  >90   GFR Estimate If Black      >60 mL/min/1.73:m2  >90   Calcium      8.5 - 10.1 mg/dL  8.0 (L)   Fibrinogen      200 - 420 mg/dL  454 (H)   D-Dimer      0.0 - 0.50 ug/ml FEU  0.7 (H)   CRP Inflammation      0.0 - 8.0 mg/L  14.7 (H)             Discharge Instructions and Follow-Up:   Discharge diet: Regular   Discharge activity: No sex for 6 week(s)   Discharge follow-up: Follow up with Dr. Burton in 3 days for blood pressure check and in 6 weeks for postpartum visit   Wound care: Tub soak bid           Discharge Disposition:   Discharged to home      Attestation:  I have reviewed today's vital signs, notes, medications, labs and imaging.    Diana Shin MD

## 2021-02-08 NOTE — PROGRESS NOTES
S: Discharge  to home with  & SO    B: Patient had a Vaginal delivery with complications of +Covid & Hypertension in Pregnancy. Baby girl Baby's name Lilia, breast: . Support person's name Blaine.     A: Pt states understanding of discharge instructions, s/s of infection, hemorrhage, Lovenox injection process & f/u needed.    R: Post partum Discharge instructions reviewed and questions answered.  Belongings gathered and returned to the patient. Agreed to follow up in 3 days for a BP check or sooner with any question or concerns.     Nursing Discharge Checklist:    Pneumovax screened and given, if appropriate: N/A  Influenza vaccine screened and given, if appropriate: N/A  Staples removed (): N/A  Breast milk returned: N/A  Hydrogel pads sent home:N/A  Birth Certificate Done: YES  Public Health Referral Made: N/A

## 2021-02-08 NOTE — PLAN OF CARE
Pt doing well, VSS, excited to be discharge to home today. Pt has no questions regarding plan of care for home.

## 2021-02-08 NOTE — PLAN OF CARE
S: Shift note  B: First day following . . Pre-eclampsia. Labial tear with repair.   A: Mag discontinued after 24 hours at 1630. Pt has been voiding adequately. Up to bathroom with SBA. Feels lethargic. Pain controlled with Tylenol and Ibuprofen. Uses jerson bottle and Dibucaine spray with assist. Breastfeeding well, requires some encouragement and assist with latch. SO present and supportive. Hgb 9.4 the morning after delivery and 8.4 6 hours later. Flow is minimal to moderate. Baseball sized clot passed. Dr. Borden aware, serial hgbs ordered.  R: Encourage videos this evening. Assist with BF. Assist with up to shower.

## 2021-02-08 NOTE — PLAN OF CARE
S: Shift review   B:Jyoti is a  who delivered vaginally on 21 and is covid +  A: VSS, patient is independent with mobility, pain well controlled with p.o. pain meds. Handles baby with confidence.  Pt does need encouragement with breastfeeding every 2-3 hours.  R: Continue with routine PP care

## 2021-02-11 ENCOUNTER — PRENATAL OFFICE VISIT (OUTPATIENT)
Dept: FAMILY MEDICINE | Facility: CLINIC | Age: 21
End: 2021-02-11
Payer: COMMERCIAL

## 2021-02-11 VITALS
DIASTOLIC BLOOD PRESSURE: 80 MMHG | OXYGEN SATURATION: 99 % | TEMPERATURE: 97 F | RESPIRATION RATE: 12 BRPM | HEART RATE: 100 BPM | SYSTOLIC BLOOD PRESSURE: 120 MMHG

## 2021-02-11 DIAGNOSIS — O13.9 PREGNANCY INDUCED HYPERTENSION, ANTEPARTUM: ICD-10-CM

## 2021-02-11 PROCEDURE — 99213 OFFICE O/P EST LOW 20 MIN: CPT | Mod: 25 | Performed by: FAMILY MEDICINE

## 2021-02-11 PROCEDURE — 99207 PR PRENATAL VISIT: CPT | Performed by: FAMILY MEDICINE

## 2021-02-11 NOTE — PROGRESS NOTES
Subjective     Recheck bp      Jyoti is a 20 year old female who comes to clinic for blood pressure recheck.  She recently delivered.  Developed preeclampsia with severe range blood pressures.  She had no symptoms at the time.  To elevated blood pressures of 161-hour apart.  Was treated with magnesium and medications appropriately.  She continues to feel fine.  Recovering nicely in her postpartum course.  Specifically denies headache, right upper quadrant pain, visual disturbance.  Also found to be Covid positive and at the time of testing was asymptomatic, that said a week prior she had developed cough, shortness of breath, low-grade fever but multiple testing occasions then showed Covid negative.  So bit confusing    Review of Systems         Objective    There were no vitals taken for this visit.     Wt Readings from Last 2 Encounters:   02/04/21 88.9 kg (196 lb)   01/07/21 82.6 kg (182 lb)       Physical Exam   Well-appearing.  Lungs clear.  Heart regular.  Extremities without edema.    Diagnostic Test Results:  Labs reviewed in Epic        Assessment & Plan       ICD-10-CM    1. Pregnancy induced hypertension, antepartum  O13.9        Continue on Lovenox x6 weeks for postpartum and Covid positivity  I believe she was symptomatic and we had false negatives the week prior  Blood pressure normal today, reeducated on signs and symptoms of preeclampsia and when to return for care  Recheck her blood pressure next week in clinic      No follow-ups on file.    Jose Burton MD  [facility]

## 2021-02-12 NOTE — PROGRESS NOTES
Jyoti New  Gender: female  : 2000  [address] [facility] [id number] [phone] (home)   Medical Record: [id number]  [facility]   ?   Discharge Phone Call: Key Words/Key Times     How are you and the baby?       How are feedings going?       Voiding & Stooling?       Any questions or concerns?       Follow-up appointment?         We want to provide excellent care here at [facility]. Do you have any feedback for us that would help us improve?       Call back COMMENTS:         Attempted Calls:   ___2021 1030 discharge callback attempted, L/M per JULIA Padilla RN______     __ ________

## 2021-02-26 ENCOUNTER — NURSE TRIAGE (OUTPATIENT)
Dept: NURSING | Facility: CLINIC | Age: 21
End: 2021-02-26

## 2021-02-26 ENCOUNTER — HOSPITAL ENCOUNTER (EMERGENCY)
Facility: CLINIC | Age: 21
Discharge: HOME | End: 2021-02-26
Attending: FAMILY MEDICINE | Admitting: FAMILY MEDICINE
Payer: COMMERCIAL

## 2021-02-26 VITALS
OXYGEN SATURATION: 99 % | TEMPERATURE: 98.1 F | SYSTOLIC BLOOD PRESSURE: 117 MMHG | DIASTOLIC BLOOD PRESSURE: 74 MMHG | BODY MASS INDEX: 27.21 KG/M2 | RESPIRATION RATE: 19 BRPM | HEART RATE: 67 BPM | WEIGHT: 180 LBS

## 2021-02-26 DIAGNOSIS — U07.1 LAB TEST POSITIVE FOR DETECTION OF COVID-19 VIRUS: ICD-10-CM

## 2021-02-26 DIAGNOSIS — R07.89 ATYPICAL CHEST PAIN: ICD-10-CM

## 2021-02-26 LAB
ALBUMIN SERPL-MCNC: 3.2 G/DL (ref 3.4–5)
ALP SERPL-CCNC: 195 U/L (ref 40–150)
ALT SERPL W P-5'-P-CCNC: 89 U/L (ref 0–50)
ANION GAP SERPL CALCULATED.3IONS-SCNC: 4 MMOL/L (ref 3–14)
AST SERPL W P-5'-P-CCNC: 127 U/L (ref 0–45)
BASOPHILS # BLD AUTO: 0.1 10E9/L (ref 0–0.2)
BASOPHILS NFR BLD AUTO: 0.8 %
BILIRUB SERPL-MCNC: 0.3 MG/DL (ref 0.2–1.3)
BUN SERPL-MCNC: 14 MG/DL (ref 7–30)
CALCIUM SERPL-MCNC: 9 MG/DL (ref 8.5–10.1)
CHLORIDE SERPL-SCNC: 109 MMOL/L (ref 94–109)
CO2 SERPL-SCNC: 27 MMOL/L (ref 20–32)
CREAT SERPL-MCNC: 0.68 MG/DL (ref 0.52–1.04)
DIFFERENTIAL METHOD BLD: ABNORMAL
EOSINOPHIL NFR BLD AUTO: 1.8 %
ERYTHROCYTE [DISTWIDTH] IN BLOOD BY AUTOMATED COUNT: 12.5 % (ref 10–15)
GFR SERPL CREATININE-BSD FRML MDRD: >90 ML/MIN/{1.73_M2}
GLUCOSE SERPL-MCNC: 91 MG/DL (ref 70–99)
HCT VFR BLD AUTO: 33.9 % (ref 35–47)
HGB BLD-MCNC: 11 G/DL (ref 11.7–15.7)
IMM GRANULOCYTES # BLD: 0 10E9/L (ref 0–0.4)
IMM GRANULOCYTES NFR BLD: 0.4 %
LYMPHOCYTES # BLD AUTO: 1.1 10E9/L (ref 0.8–5.3)
LYMPHOCYTES NFR BLD AUTO: 13.1 %
MCH RBC QN AUTO: 30.6 PG (ref 26.5–33)
MCHC RBC AUTO-ENTMCNC: 32.4 G/DL (ref 31.5–36.5)
MCV RBC AUTO: 94 FL (ref 78–100)
MONOCYTES # BLD AUTO: 0.6 10E9/L (ref 0–1.3)
MONOCYTES NFR BLD AUTO: 7.2 %
NEUTROPHILS # BLD AUTO: 6.6 10E9/L (ref 1.6–8.3)
NEUTROPHILS NFR BLD AUTO: 76.7 %
NRBC # BLD AUTO: 0 10*3/UL
NRBC BLD AUTO-RTO: 0 /100
PLATELET # BLD AUTO: 367 10E9/L (ref 150–450)
POTASSIUM SERPL-SCNC: 3.9 MMOL/L (ref 3.4–5.3)
PROT SERPL-MCNC: 7.1 G/DL (ref 6.8–8.8)
RBC # BLD AUTO: 3.6 10E12/L (ref 3.8–5.2)
SODIUM SERPL-SCNC: 140 MMOL/L (ref 133–144)
TROPONIN I SERPL-MCNC: <0.015 UG/L (ref 0–0.04)
WBC # BLD AUTO: 8.6 10E9/L (ref 4–11)

## 2021-02-26 PROCEDURE — 80053 COMPREHEN METABOLIC PANEL: CPT | Performed by: FAMILY MEDICINE

## 2021-02-26 PROCEDURE — 99284 EMERGENCY DEPT VISIT MOD MDM: CPT | Performed by: FAMILY MEDICINE

## 2021-02-26 PROCEDURE — 84484 ASSAY OF TROPONIN QUANT: CPT | Performed by: FAMILY MEDICINE

## 2021-02-26 PROCEDURE — 93010 ELECTROCARDIOGRAM REPORT: CPT | Performed by: FAMILY MEDICINE

## 2021-02-26 PROCEDURE — 99284 EMERGENCY DEPT VISIT MOD MDM: CPT | Mod: 25 | Performed by: FAMILY MEDICINE

## 2021-02-26 PROCEDURE — 85025 COMPLETE CBC W/AUTO DIFF WBC: CPT | Performed by: FAMILY MEDICINE

## 2021-02-26 PROCEDURE — 93005 ELECTROCARDIOGRAM TRACING: CPT | Performed by: FAMILY MEDICINE

## 2021-02-26 NOTE — ED PROVIDER NOTES
"  History     Chief Complaint   Patient presents with     Chest Pain     HPI  Jyoti New is a 20 year old female who is status post normal spontaneous vaginal delivery at 38 3/7 weeks gestation on 2/6/2021.  Patient had complications of preeclampsia requiring magnesium.  She did not require induction.  Her blood pressure has improved/resolved without pharmacological treatment.  Patient was seen in the clinic 2/11 and blood pressure normalized.  However the patient did test Covid positive/asymptomatic at the time of delivery and due to her pregnancy/Covid positive and elevated D-dimer she was started on Lovenox prophylactically for 6 weeks.  She comes to the ED today with substernal chest pain \"like a pressure\".  Pain was sudden onset approximately 1-1/2 hours prior to arrival to the ED.  In route to the ED she felt nauseated threw up and had complete resolution of the pain.  She is pain-free at this time.  She had no chest pains during her pregnancy.  She has no history of DVT or PE.  No family history of coagulopathy known.  Patient had some leg swelling during her pregnancy but states her legs are almost back to normal size.  She has some varicose veins but no pain and no swelling.  She has no shortness of breath.  She does not feel ill with fever chills body aches malaise.  She is a little tired.  She is breast-feeding alternating with bottlefeeding and it is going well.    Allergies:  No Known Allergies    Problem List:    Patient Active Problem List    Diagnosis Date Noted     Pregnancy induced hypertension, antepartum 02/11/2021     Priority: Medium     Normal labor 02/06/2021     Priority: Medium     Labor and delivery, indication for care 02/01/2021     Priority: Medium     High-risk pregnancy supervision, unspecified trimester 12/09/2020     Priority: Medium        Past Medical History:    Past Medical History:   Diagnosis Date     Known health problems: none        Past Surgical History:    Past " Surgical History:   Procedure Laterality Date     NO HISTORY OF SURGERY         Family History:    Family History   Problem Relation Age of Onset     No Known Problems Mother      Diabetes Father      Emphysema Father      No Known Problems Sister      Abdominal Aortic Aneurysm Brother      Diabetes Maternal Grandmother      Myocardial Infarction Maternal Grandmother      Skin Cancer Maternal Grandfather      Diabetes Maternal Grandfather      Myocardial Infarction Maternal Grandfather      Cancer Paternal Grandmother      No Known Problems Paternal Grandfather      Sudden Infant Death Syndrome Sister      No Known Problems Half-Brother      No Known Problems Half-Brother      No Known Problems Brother      No Known Problems Brother      No Known Problems Brother        Social History:  Marital Status:  Single [1]  Social History     Tobacco Use     Smoking status: Current Every Day Smoker     Smokeless tobacco: Never Used   Substance Use Topics     Alcohol use: Not Currently     Frequency: Never     Binge frequency: Never     Drug use: Never        Medications:    acetaminophen (TYLENOL) 325 MG tablet  enoxaparin ANTICOAGULANT (LOVENOX) 40 MG/0.4ML syringe  ferrous sulfate (FEROSUL) 325 (65 Fe) MG tablet  ibuprofen (ADVIL/MOTRIN) 600 MG tablet  lanolin ointment  Prenatal Vit-Fe Fumarate-FA (PRENATAL MULTIVITAMIN W/IRON) 27-0.8 MG tablet  senna-docusate (SENOKOT-S/PERICOLACE) 8.6-50 MG tablet          Review of Systems   All other systems reviewed and are negative.      Physical Exam   BP: 115/80  Pulse: 71  Temp: 98.1  F (36.7  C)  Resp: 16  Weight: 81.6 kg (180 lb)  SpO2: 99 %      Physical Exam  Vitals signs and nursing note reviewed.   Constitutional:       Appearance: She is obese. She is not ill-appearing or diaphoretic.   HENT:      Head: Normocephalic and atraumatic.      Nose: Nose normal.      Mouth/Throat:      Mouth: Mucous membranes are moist.   Eyes:      Extraocular Movements: Extraocular movements  intact.      Pupils: Pupils are equal, round, and reactive to light.   Neck:      Musculoskeletal: Normal range of motion and neck supple.   Cardiovascular:      Rate and Rhythm: Normal rate and regular rhythm.      Pulses: Normal pulses.      Heart sounds: Normal heart sounds.   Pulmonary:      Effort: Pulmonary effort is normal.      Breath sounds: Normal breath sounds.   Abdominal:      General: Abdomen is flat.   Musculoskeletal: Normal range of motion.         General: No swelling or tenderness.   Skin:     General: Skin is warm.      Capillary Refill: Capillary refill takes less than 2 seconds.   Neurological:      General: No focal deficit present.      Mental Status: She is alert and oriented to person, place, and time.   Psychiatric:         Mood and Affect: Mood normal.         Behavior: Behavior normal.         ED Course          EKG Interpretation:      Interpreted by Domenico Cristobal MD   Symptoms at time of EKG: None   Rhythm: Sinus bradycardia  Rate: Bradycardia  Axis: Normal  Ectopy: None  Conduction: Normal  ST Segments/ T Waves: No ST-T wave changes and No acute ischemic changes  Q Waves: None  Comparison to prior: No old EKG available    Clinical Impression: Sinus bradycardia at 57 with no acute ST-T changes         Procedures               Critical Care time:  none               Results for orders placed or performed during the hospital encounter of 02/26/21 (from the past 24 hour(s))   CBC with platelets differential   Result Value Ref Range    WBC 8.6 4.0 - 11.0 10e9/L    RBC Count 3.60 (L) 3.8 - 5.2 10e12/L    Hemoglobin 11.0 (L) 11.7 - 15.7 g/dL    Hematocrit 33.9 (L) 35.0 - 47.0 %    MCV 94 78 - 100 fl    MCH 30.6 26.5 - 33.0 pg    MCHC 32.4 31.5 - 36.5 g/dL    RDW 12.5 10.0 - 15.0 %    Platelet Count 367 150 - 450 10e9/L    Diff Method Automated Method     % Neutrophils 76.7 %    % Lymphocytes 13.1 %    % Monocytes 7.2 %    % Eosinophils 1.8 %    % Basophils 0.8 %    % Immature Granulocytes  0.4 %    Nucleated RBCs 0 0 /100    Absolute Neutrophil 6.6 1.6 - 8.3 10e9/L    Absolute Lymphocytes 1.1 0.8 - 5.3 10e9/L    Absolute Monocytes 0.6 0.0 - 1.3 10e9/L    Absolute Basophils 0.1 0.0 - 0.2 10e9/L    Abs Immature Granulocytes 0.0 0 - 0.4 10e9/L    Absolute Nucleated RBC 0.0    Comprehensive metabolic panel   Result Value Ref Range    Sodium 140 133 - 144 mmol/L    Potassium 3.9 3.4 - 5.3 mmol/L    Chloride 109 94 - 109 mmol/L    Carbon Dioxide 27 20 - 32 mmol/L    Anion Gap 4 3 - 14 mmol/L    Glucose 91 70 - 99 mg/dL    Urea Nitrogen 14 7 - 30 mg/dL    Creatinine 0.68 0.52 - 1.04 mg/dL    GFR Estimate >90 >60 mL/min/[1.73_m2]    GFR Estimate If Black >90 >60 mL/min/[1.73_m2]    Calcium 9.0 8.5 - 10.1 mg/dL    Bilirubin Total 0.3 0.2 - 1.3 mg/dL    Albumin 3.2 (L) 3.4 - 5.0 g/dL    Protein Total 7.1 6.8 - 8.8 g/dL    Alkaline Phosphatase 195 (H) 40 - 150 U/L    ALT 89 (H) 0 - 50 U/L     (H) 0 - 45 U/L   Troponin I   Result Value Ref Range    Troponin I ES <0.015 0.000 - 0.045 ug/L       Medications - No data to display    Assessments & Plan (with Medical Decision Making)   MDM--20-year-old female who is 3 weeks postpartum normal spontaneous vaginal delivery with some elevated blood pressure/preeclampsia at the time of birth which has resolved.  Patient had negative Covid test during the pregnancy when she had some cold symptoms but tested positive for Covid when she was asymptomatic at the time of delivery.  Since then she has been retested and is Covid negative.  D-dimer was elevated at 3.9 initially but had dropped to 0.7 on discharge on 2/08.  Patient now presents with an episode of chest pain lasting approximately 1-1/2 hours.  She had emesis x1 with complete resolution of the pain.  This chest pain started at rest.  There was no exertion prior to the chest pain.  The patient is on Lovenox and will be on it for another 3 weeks prophylactically.  She had no hypoxia, tachycardia, shortness of  breath associated with this chest pain.  Her EKG is normal.  I did not recheck her D-dimer as she is already on Lovenox, having no symptoms of PE, normal EKG, no ongoing pain, no shortness of breath, normal O2 sat and sinus bradycardia.  She was anemic but within 3 weeks her hemoglobin is already up to the eleven.  Her liver enzymes are slightly elevated and I mentioned this to the patient.  This could be related to Covid.  Patient also had some alcohol last night.  She is also at risk for gallbladder disease but this does not sound like that and she has a benign abdominal exam with no tenderness and she is pain-free.  Her significant other also mentioned anxiety and we discussed this as a possible etiology also.  Patient is discharged in symptom-free condition.  I have reviewed the nursing notes.    I have reviewed the findings, diagnosis, plan and need for follow up with the patient.       New Prescriptions    No medications on file       Final diagnoses:   Atypical chest pain   Postpartum anemia   Lab test positive for detection of COVID-19 virus       2/26/2021   [facility] EMERGENCY DEPT     Susu, Domenico BREWSTER MD  02/26/21 [id number]

## 2021-02-26 NOTE — DISCHARGE INSTRUCTIONS
The cause for your episode of chest pain this morning is unclear.  Your heart checks out normal.  You were on Lovenox to prevent blood clots and there is no evidence that you have a blood clot.  Your liver enzymes are slightly elevated which can be related to your Covid or gallbladder.  As your pain has completely resolved I do not feel further investigation is necessary but you should get your labs rechecked in a few weeks in the clinic.  Return to the emergency department if you are having any difficulties breathing or severe chest pain.

## 2021-02-26 NOTE — TELEPHONE ENCOUNTER
Patient complains of constant chest pain for past 30 minutes and some difficulty breathing; patient having difficulty speaking.  Advised to call 911; patient states she will have boyfriend take her to ED.    Reason for Disposition    Severe difficulty breathing (e.g., struggling for each breath, speaks in single words)    Protocols used: CHEST PAIN-A-[place]

## 2021-02-26 NOTE — ED TRIAGE NOTES
Pt here with chest pain, that came on a couple days ago, made her vomit.  Same happened today.  On Lovenox, baby three weeks ago

## 2021-02-27 ENCOUNTER — APPOINTMENT (OUTPATIENT)
Dept: ULTRASOUND IMAGING | Facility: CLINIC | Age: 21
DRG: 769 | End: 2021-02-27
Attending: FAMILY MEDICINE
Payer: COMMERCIAL

## 2021-02-27 ENCOUNTER — HOSPITAL ENCOUNTER (INPATIENT)
Facility: CLINIC | Age: 21
LOS: 2 days | Discharge: HOME | DRG: 769 | End: 2021-03-01
Attending: FAMILY MEDICINE | Admitting: FAMILY MEDICINE
Payer: COMMERCIAL

## 2021-02-27 ENCOUNTER — APPOINTMENT (OUTPATIENT)
Dept: CT IMAGING | Facility: CLINIC | Age: 21
DRG: 769 | End: 2021-02-27
Attending: FAMILY MEDICINE
Payer: COMMERCIAL

## 2021-02-27 DIAGNOSIS — Z90.49 S/P LAPAROSCOPIC CHOLECYSTECTOMY: Primary | ICD-10-CM

## 2021-02-27 DIAGNOSIS — K85.10 GALLSTONE PANCREATITIS: ICD-10-CM

## 2021-02-27 PROBLEM — K80.20 CHOLELITHIASIS: Status: ACTIVE | Noted: 2021-02-27

## 2021-02-27 LAB
ALBUMIN SERPL-MCNC: 3.2 G/DL (ref 3.4–5)
ALBUMIN SERPL-MCNC: 3.7 G/DL (ref 3.4–5)
ALBUMIN UR-MCNC: NEGATIVE MG/DL
ALP SERPL-CCNC: 285 U/L (ref 40–150)
ALP SERPL-CCNC: 328 U/L (ref 40–150)
ALT SERPL W P-5'-P-CCNC: 440 U/L (ref 0–50)
ALT SERPL W P-5'-P-CCNC: 544 U/L (ref 0–50)
ANION GAP SERPL CALCULATED.3IONS-SCNC: 2 MMOL/L (ref 3–14)
APPEARANCE UR: CLEAR
APTT PPP: 28 SEC (ref 22–37)
AST SERPL W P-5'-P-CCNC: 262 U/L (ref 0–45)
AST SERPL W P-5'-P-CCNC: 406 U/L (ref 0–45)
BASOPHILS # BLD AUTO: 0.1 10E9/L (ref 0–0.2)
BASOPHILS NFR BLD AUTO: 0.4 %
BILIRUB DIRECT SERPL-MCNC: 0.2 MG/DL (ref 0–0.2)
BILIRUB DIRECT SERPL-MCNC: 0.7 MG/DL (ref 0–0.2)
BILIRUB SERPL-MCNC: 0.5 MG/DL (ref 0.2–1.3)
BILIRUB SERPL-MCNC: 1.2 MG/DL (ref 0.2–1.3)
BILIRUB UR QL STRIP: NEGATIVE
BUN SERPL-MCNC: 12 MG/DL (ref 7–30)
CALCIUM SERPL-MCNC: 9.1 MG/DL (ref 8.5–10.1)
CHLORIDE SERPL-SCNC: 108 MMOL/L (ref 94–109)
CO2 SERPL-SCNC: 30 MMOL/L (ref 20–32)
COLOR UR AUTO: YELLOW
CREAT SERPL-MCNC: 0.74 MG/DL (ref 0.52–1.04)
CRP SERPL-MCNC: <2.9 MG/L (ref 0–8)
DIFFERENTIAL METHOD BLD: ABNORMAL
EOSINOPHIL NFR BLD AUTO: 0.8 %
ERYTHROCYTE [DISTWIDTH] IN BLOOD BY AUTOMATED COUNT: 12.5 % (ref 10–15)
GFR SERPL CREATININE-BSD FRML MDRD: >90 ML/MIN/{1.73_M2}
GLUCOSE SERPL-MCNC: 132 MG/DL (ref 70–99)
GLUCOSE UR STRIP-MCNC: NEGATIVE MG/DL
HCT VFR BLD AUTO: 39.4 % (ref 35–47)
HGB BLD-MCNC: 12.9 G/DL (ref 11.7–15.7)
HGB UR QL STRIP: ABNORMAL
IMM GRANULOCYTES # BLD: 0.1 10E9/L (ref 0–0.4)
IMM GRANULOCYTES NFR BLD: 0.4 %
INR PPP: 0.98 (ref 0.86–1.14)
KETONES UR STRIP-MCNC: NEGATIVE MG/DL
LACTATE BLD-SCNC: 0.6 MMOL/L (ref 0.7–2)
LEUKOCYTE ESTERASE UR QL STRIP: ABNORMAL
LIPASE SERPL-CCNC: ABNORMAL U/L (ref 73–393)
LYMPHOCYTES # BLD AUTO: 0.8 10E9/L (ref 0.8–5.3)
LYMPHOCYTES NFR BLD AUTO: 6 %
MCH RBC QN AUTO: 30.5 PG (ref 26.5–33)
MCHC RBC AUTO-ENTMCNC: 32.7 G/DL (ref 31.5–36.5)
MCV RBC AUTO: 93 FL (ref 78–100)
MONOCYTES # BLD AUTO: 0.5 10E9/L (ref 0–1.3)
MONOCYTES NFR BLD AUTO: 3.8 %
MUCOUS THREADS #/AREA URNS LPF: PRESENT /LPF
NEUTROPHILS # BLD AUTO: 12 10E9/L (ref 1.6–8.3)
NEUTROPHILS NFR BLD AUTO: 88.6 %
NITRATE UR QL: NEGATIVE
NRBC # BLD AUTO: 0 10*3/UL
NRBC BLD AUTO-RTO: 0 /100
PH UR STRIP: 5 PH (ref 5–7)
PLATELET # BLD AUTO: 372 10E9/L (ref 150–450)
POTASSIUM SERPL-SCNC: 3.7 MMOL/L (ref 3.4–5.3)
PROT SERPL-MCNC: 6.7 G/DL (ref 6.8–8.8)
PROT SERPL-MCNC: 7.7 G/DL (ref 6.8–8.8)
RBC # BLD AUTO: 4.23 10E12/L (ref 3.8–5.2)
RBC #/AREA URNS AUTO: 16 /HPF (ref 0–2)
SODIUM SERPL-SCNC: 140 MMOL/L (ref 133–144)
SOURCE: ABNORMAL
SP GR UR STRIP: 1.01 (ref 1–1.03)
SQUAMOUS #/AREA URNS AUTO: 1 /HPF (ref 0–1)
UROBILINOGEN UR STRIP-MCNC: 4 MG/DL (ref 0–2)
WBC # BLD AUTO: 13.6 10E9/L (ref 4–11)
WBC #/AREA URNS AUTO: 21 /HPF (ref 0–5)

## 2021-02-27 PROCEDURE — 250N000011 HC RX IP 250 OP 636: Performed by: NURSE PRACTITIONER

## 2021-02-27 PROCEDURE — 258N000003 HC RX IP 258 OP 636: Performed by: FAMILY MEDICINE

## 2021-02-27 PROCEDURE — 250N000009 HC RX 250: Performed by: FAMILY MEDICINE

## 2021-02-27 PROCEDURE — 85730 THROMBOPLASTIN TIME PARTIAL: CPT | Performed by: FAMILY MEDICINE

## 2021-02-27 PROCEDURE — 86140 C-REACTIVE PROTEIN: CPT | Performed by: FAMILY MEDICINE

## 2021-02-27 PROCEDURE — 80076 HEPATIC FUNCTION PANEL: CPT | Performed by: NURSE PRACTITIONER

## 2021-02-27 PROCEDURE — 85610 PROTHROMBIN TIME: CPT | Performed by: FAMILY MEDICINE

## 2021-02-27 PROCEDURE — 93005 ELECTROCARDIOGRAM TRACING: CPT | Performed by: FAMILY MEDICINE

## 2021-02-27 PROCEDURE — 87040 BLOOD CULTURE FOR BACTERIA: CPT | Performed by: NURSE PRACTITIONER

## 2021-02-27 PROCEDURE — 120N000001 HC R&B MED SURG/OB

## 2021-02-27 PROCEDURE — 99222 1ST HOSP IP/OBS MODERATE 55: CPT | Mod: AI | Performed by: NURSE PRACTITIONER

## 2021-02-27 PROCEDURE — 86769 SARS-COV-2 COVID-19 ANTIBODY: CPT | Performed by: NURSE PRACTITIONER

## 2021-02-27 PROCEDURE — 74177 CT ABD & PELVIS W/CONTRAST: CPT

## 2021-02-27 PROCEDURE — 99207 PR CDG-MDM COMPONENT: MEETS MODERATE - DOWN CODED: CPT | Performed by: NURSE PRACTITIONER

## 2021-02-27 PROCEDURE — 99285 EMERGENCY DEPT VISIT HI MDM: CPT | Mod: 25 | Performed by: FAMILY MEDICINE

## 2021-02-27 PROCEDURE — 80048 BASIC METABOLIC PNL TOTAL CA: CPT | Performed by: FAMILY MEDICINE

## 2021-02-27 PROCEDURE — 96374 THER/PROPH/DIAG INJ IV PUSH: CPT | Performed by: FAMILY MEDICINE

## 2021-02-27 PROCEDURE — 83605 ASSAY OF LACTIC ACID: CPT | Performed by: NURSE PRACTITIONER

## 2021-02-27 PROCEDURE — 36415 COLL VENOUS BLD VENIPUNCTURE: CPT | Performed by: NURSE PRACTITIONER

## 2021-02-27 PROCEDURE — 83690 ASSAY OF LIPASE: CPT | Performed by: FAMILY MEDICINE

## 2021-02-27 PROCEDURE — 99285 EMERGENCY DEPT VISIT HI MDM: CPT | Performed by: FAMILY MEDICINE

## 2021-02-27 PROCEDURE — 87086 URINE CULTURE/COLONY COUNT: CPT | Performed by: FAMILY MEDICINE

## 2021-02-27 PROCEDURE — 250N000011 HC RX IP 250 OP 636: Performed by: FAMILY MEDICINE

## 2021-02-27 PROCEDURE — 80076 HEPATIC FUNCTION PANEL: CPT | Performed by: FAMILY MEDICINE

## 2021-02-27 PROCEDURE — 96361 HYDRATE IV INFUSION ADD-ON: CPT | Performed by: FAMILY MEDICINE

## 2021-02-27 PROCEDURE — 76705 ECHO EXAM OF ABDOMEN: CPT

## 2021-02-27 PROCEDURE — 258N000003 HC RX IP 258 OP 636: Performed by: NURSE PRACTITIONER

## 2021-02-27 PROCEDURE — 96375 TX/PRO/DX INJ NEW DRUG ADDON: CPT | Performed by: FAMILY MEDICINE

## 2021-02-27 PROCEDURE — 85025 COMPLETE CBC W/AUTO DIFF WBC: CPT | Performed by: FAMILY MEDICINE

## 2021-02-27 PROCEDURE — 81001 URINALYSIS AUTO W/SCOPE: CPT | Performed by: FAMILY MEDICINE

## 2021-02-27 RX ORDER — NALOXONE HYDROCHLORIDE 0.4 MG/ML
0.4 INJECTION, SOLUTION INTRAMUSCULAR; INTRAVENOUS; SUBCUTANEOUS
Status: DISCONTINUED | OUTPATIENT
Start: 2021-02-27 | End: 2021-03-01 | Stop reason: HOSPADM

## 2021-02-27 RX ORDER — NALOXONE HYDROCHLORIDE 0.4 MG/ML
0.2 INJECTION, SOLUTION INTRAMUSCULAR; INTRAVENOUS; SUBCUTANEOUS
Status: DISCONTINUED | OUTPATIENT
Start: 2021-02-27 | End: 2021-03-01 | Stop reason: HOSPADM

## 2021-02-27 RX ORDER — KETOROLAC TROMETHAMINE 30 MG/ML
30 INJECTION, SOLUTION INTRAMUSCULAR; INTRAVENOUS ONCE
Status: COMPLETED | OUTPATIENT
Start: 2021-02-27 | End: 2021-02-27

## 2021-02-27 RX ORDER — LIDOCAINE 40 MG/G
CREAM TOPICAL
Status: DISCONTINUED | OUTPATIENT
Start: 2021-02-27 | End: 2021-03-01 | Stop reason: HOSPADM

## 2021-02-27 RX ORDER — KETOROLAC TROMETHAMINE 30 MG/ML
30 INJECTION, SOLUTION INTRAMUSCULAR; INTRAVENOUS EVERY 6 HOURS PRN
Status: DISCONTINUED | OUTPATIENT
Start: 2021-02-27 | End: 2021-03-01 | Stop reason: HOSPADM

## 2021-02-27 RX ORDER — SODIUM CHLORIDE, SODIUM LACTATE, POTASSIUM CHLORIDE, CALCIUM CHLORIDE 600; 310; 30; 20 MG/100ML; MG/100ML; MG/100ML; MG/100ML
INJECTION, SOLUTION INTRAVENOUS CONTINUOUS
Status: DISCONTINUED | OUTPATIENT
Start: 2021-02-27 | End: 2021-03-01 | Stop reason: HOSPADM

## 2021-02-27 RX ORDER — PROCHLORPERAZINE 25 MG
25 SUPPOSITORY, RECTAL RECTAL EVERY 12 HOURS PRN
Status: DISCONTINUED | OUTPATIENT
Start: 2021-02-27 | End: 2021-03-01 | Stop reason: HOSPADM

## 2021-02-27 RX ORDER — ONDANSETRON 4 MG/1
4 TABLET, ORALLY DISINTEGRATING ORAL EVERY 6 HOURS PRN
Status: DISCONTINUED | OUTPATIENT
Start: 2021-02-27 | End: 2021-03-01 | Stop reason: HOSPADM

## 2021-02-27 RX ORDER — ONDANSETRON 2 MG/ML
4 INJECTION INTRAMUSCULAR; INTRAVENOUS EVERY 30 MIN PRN
Status: DISCONTINUED | OUTPATIENT
Start: 2021-02-27 | End: 2021-02-27

## 2021-02-27 RX ORDER — SODIUM CHLORIDE 9 MG/ML
INJECTION, SOLUTION INTRAVENOUS CONTINUOUS
Status: DISCONTINUED | OUTPATIENT
Start: 2021-02-27 | End: 2021-02-27

## 2021-02-27 RX ORDER — PROCHLORPERAZINE MALEATE 5 MG
10 TABLET ORAL EVERY 6 HOURS PRN
Status: DISCONTINUED | OUTPATIENT
Start: 2021-02-27 | End: 2021-03-01 | Stop reason: HOSPADM

## 2021-02-27 RX ORDER — ONDANSETRON 2 MG/ML
4 INJECTION INTRAMUSCULAR; INTRAVENOUS EVERY 6 HOURS PRN
Status: DISCONTINUED | OUTPATIENT
Start: 2021-02-27 | End: 2021-03-01 | Stop reason: HOSPADM

## 2021-02-27 RX ORDER — MODIFIED LANOLIN
OINTMENT (GRAM) TOPICAL
Status: DISCONTINUED | OUTPATIENT
Start: 2021-02-27 | End: 2021-03-01 | Stop reason: HOSPADM

## 2021-02-27 RX ORDER — HYDROMORPHONE HYDROCHLORIDE 1 MG/ML
0.5 INJECTION, SOLUTION INTRAMUSCULAR; INTRAVENOUS; SUBCUTANEOUS EVERY 4 HOURS PRN
Status: DISCONTINUED | OUTPATIENT
Start: 2021-02-27 | End: 2021-03-01 | Stop reason: HOSPADM

## 2021-02-27 RX ORDER — HYDROMORPHONE HYDROCHLORIDE 1 MG/ML
0.5 INJECTION, SOLUTION INTRAMUSCULAR; INTRAVENOUS; SUBCUTANEOUS
Status: DISCONTINUED | OUTPATIENT
Start: 2021-02-27 | End: 2021-02-27

## 2021-02-27 RX ORDER — IOPAMIDOL 755 MG/ML
500 INJECTION, SOLUTION INTRAVASCULAR ONCE
Status: COMPLETED | OUTPATIENT
Start: 2021-02-27 | End: 2021-02-27

## 2021-02-27 RX ADMIN — HYDROMORPHONE HYDROCHLORIDE 0.5 MG: 1 INJECTION, SOLUTION INTRAMUSCULAR; INTRAVENOUS; SUBCUTANEOUS at 19:33

## 2021-02-27 RX ADMIN — KETOROLAC TROMETHAMINE 30 MG: 30 INJECTION, SOLUTION INTRAMUSCULAR at 22:36

## 2021-02-27 RX ADMIN — SODIUM CHLORIDE, POTASSIUM CHLORIDE, SODIUM LACTATE AND CALCIUM CHLORIDE: 600; 310; 30; 20 INJECTION, SOLUTION INTRAVENOUS at 17:40

## 2021-02-27 RX ADMIN — ONDANSETRON 4 MG: 2 INJECTION INTRAMUSCULAR; INTRAVENOUS at 14:16

## 2021-02-27 RX ADMIN — SODIUM CHLORIDE 1000 ML: 9 INJECTION, SOLUTION INTRAVENOUS at 16:10

## 2021-02-27 RX ADMIN — SODIUM CHLORIDE 100 ML: 9 INJECTION, SOLUTION INTRAVENOUS at 15:48

## 2021-02-27 RX ADMIN — IOPAMIDOL 84 ML: 755 INJECTION, SOLUTION INTRAVENOUS at 15:47

## 2021-02-27 RX ADMIN — KETOROLAC TROMETHAMINE 30 MG: 30 INJECTION, SOLUTION INTRAMUSCULAR at 14:16

## 2021-02-27 NOTE — ED NOTES
ED Nursing criteria listed below was addressed during verbal handoff:     Abnormal vitals: No  Abnormal results: Yes  Med Reconciliation completed: Yes  Meds given in ED: Yes  Any Overdue Meds: No  Core Measures: No  Isolation: No  Special needs: No  Skin assessment: Yes, intact    Observation Patient  Education provided: Yes

## 2021-02-27 NOTE — ED TRIAGE NOTES
Pt comes in with complaints of abd pain that started this AM. Pt states she was seen yesterday for the same issues. Pt has N/V as well.

## 2021-02-27 NOTE — H&P
[facility]    History and Physical - Hospitalist Service       Date of Admission:  2/27/2021    Assessment & Plan     Jyoti New is a 20 year old female admitted on 2/27/2021. She presented to the emergency department with recurrent abdominal pain.  Patient was seen yesterday, found to have abnormal liver functions but pain was improved and she was discharged home.  Patient tried to eat this afternoon and pain became severe within 20 minutes.  Patient with severe, cramping, sharp upper abdominal pain.  Patient denies fever, chills, nausea.  Patient last bowel movement today.  Patient denies dysuria or hematuria.  Patient is 20 days postpartum, normal vaginal delivery of healthy baby girl.  On that admission patient was diagnosed with COVID-19, asymptomatic.  Patient considered Covid recovered.  Patient with worsening hepatic panel, lipase elevated at 23,000.  Total bilirubin remained stable at 1.2.  Alk phos 195 to 328, ALT 89 to544,  to 406.  Bilirubin direct 0.7.  White blood cells minorly elevated at 13.6.  Patient was discussed with surgery who feels the elevated white blood cells are reactive, patient's pain has resolved without narcotics.  Patient likely has had a gallstone that has passed.  Patient will require surgical consultation and probable intervention this admission.  Urinalysis appears possibly infected, culture pending.  Abdominal ultrasound shows cholelithiasis with possible cholecystitis but gallbladder decompressed.  No bile duct dilation.  CT of the abdomen shows peripancreatic fluid concerning for pancreatitis, no bile duct dilatation or calcified gallstones.  Patient will be admitted to inpatient, n.p.o., IV fluids, monitor hepatic panel every 6 hours.        Gallstone pancreatitis    Cholelithiasis  Assessment: Patient presented as above with worsening liver functions and cholelithiasis.  Patient's lipase elevated at nearly 23,000. Total bilirubin  remained stable at 1.2.  Alk phos 195 to 328, ALT 89 to544,  to 406.  Bilirubin direct 0.7.  Concern for gallstone pancreatitis, discussed with surgery, Dr. Hearn.  Patient with significant improvement, not requiring narcotics, pain resolved.  Mild leukocytosis but no fever.  Recommendation for patient to remain at [facility], no need to transfer for ERCP, and no need to start antibiotics.  Will need close monitoring of hepatic panel to evaluate for choledocholithiasis or worsening/progressive cholecystitis that would require surgical intervention and/or antibiotics.   Plan:   - Admit to inpatient  - NPO  - IV fluids with LR at 150 ml/hr   - Hold on starting antibiotics  - Monitor closely with hepatic panel every 6 hours  - Able to do MRCP tomorrow if indicated      Acute cystitis - Possible  Assessment: Patient urinalysis appears to be possibly infected.  Moderate leukocyte esterase with 21 white blood cells.  Patient is asymptomatic.   Plan: Given lactation and breast-feeding will not start antibiotics and wait for urine culture unless patient worsens, develops fever, symptoms.       Postpartum care following vaginal delivery    Breast feeding status of mother  Assessment: Patient presented on February 6 at 38 and 3/7 weeks gestation, and had a normal spontaneous vaginal delivery.  Patient was Covid positive, had preeclampsia during hospitalization that has resolved and follow-up.  Patient with anemia, vaginal laceration.  Patient states bleeding has significantly diminished.  Patient is breast-feeding, states this is going well.   Plan:   - Postpartum care.    - Monitor medications given for lactation safety.       Covid recovered  Assessment : Covid positive on February 6, asymptomatic.  The week prior to this patient had symptoms but tested negative.  Patient had been on Lovenox last dose 2/ 26.   Plan: Test for antibodies.  Hold Lovenox for possible surgical intervention.        Diet:   NPO  DVT  Prophylaxis: Pneumatic Compression Devices  Rodriguez Catheter: not present  Code Status:  Full code         Disposition Plan   Expected discharge: 4 - 7 days, recommended to prior living arrangement once adequate pain management/ tolerating PO medications, safe disposition plan/ TCU bed available and Pancreatitis resolved, surgical consultation, possible cholecystectomy.  Entered: Mary Lee CNP 02/27/2021, 5:03 PM     The patient's care was discussed with the Attending Physician, Dr. Gtz, Bedside Nurse, Care Coordinator/, Patient and Patient's Family.    Mary Lee CNP  [facility]  Contact information available via [facility] Paging/Directory      ______________________________________________________________________    Chief Complaint   Abdominal pain, nausea    History is obtained from the patient, electronic health record and emergency department physician    History of Present Illness   Jyoti New is a 20 year old female who presented to the emergency department with recurrent abdominal pain.  Patient was seen yesterday, found to have abnormal liver functions but pain was improved and she was discharged home.  Patient tried to eat this afternoon and pain became severe within 20 minutes.  Patient with severe, cramping, sharp upper abdominal pain.  Patient denies fever, chills, nausea.  Patient last bowel movement today.  Patient denies dysuria or hematuria.  Patient is 20 days postpartum, normal vaginal delivery of healthy baby girl.  On that admission patient was diagnosed with COVID-19, asymptomatic.  Patient considered Covid recovered.  Patient with worsening hepatic panel, lipase elevated at 23,000.  Total bilirubin remained stable at 1.2.  Alk phos 1 95-3 28, ALT 89-5 44, AST 1 27-4 06.  Bilirubin direct 0.7.  White blood cells minorly elevated at 13.6.  Patient was discussed with surgery who feels the elevated white blood cells are  reactive, patient's pain has resolved without narcotics.  Patient likely has had a gallstone that has passed.  Patient will require surgical consultation and probable intervention this admission.  Urinalysis appears possibly infected, culture pending.  Abdominal ultrasound shows cholelithiasis with possible cholecystitis but gallbladder decompressed.  No bile duct dilation.  CT of the abdomen shows peripancreatic fluid concerning for pancreatitis, no bile duct dilatation or calcified gallstones.       Review of Systems    CONSTITUTIONAL: NEGATIVE for fever, chills, change in weight  ENT/MOUTH: NEGATIVE for ear, mouth and throat problems  RESP: NEGATIVE for significant cough or SOB  CV: NEGATIVE for chest pain, palpitations or peripheral edema  GI: POSITIVE for nausea, poor appetite, vomiting and abdominal pain  : Spontaneous vaginal birth on 2/6/2021, bleeding and discharge subsiding  MUSCULOSKELETAL: NEGATIVE for significant arthralgias or myalgia  NEURO: NEGATIVE for weakness, dizziness or paresthesias  PSYCHIATRIC: NEGATIVE for changes in mood or affect  ROS otherwise negative    Past Medical History    I have reviewed this patient's medical history and updated it with pertinent information if needed.   Past Medical History:   Diagnosis Date     Cholelithiasis 2/27/2021     Known health problems: none        Past Surgical History   I have reviewed this patient's surgical history and updated it with pertinent information if needed.  Past Surgical History:   Procedure Laterality Date     NO HISTORY OF SURGERY         Social History   I have reviewed this patient's social history and updated it with pertinent information if needed.  Social History     Tobacco Use     Smoking status: Current Every Day Smoker     Packs/day: 0.25     Smokeless tobacco: Never Used   Substance Use Topics     Alcohol use: Yes     Frequency: Never     Binge frequency: Never     Comment: occ     Drug use: Never       Family History   I  have reviewed this patient's family history and updated it with pertinent information if needed.  Family History   Problem Relation Age of Onset     No Known Problems Mother      Diabetes Father      Emphysema Father      No Known Problems Sister      Abdominal Aortic Aneurysm Brother      Diabetes Maternal Grandmother      Myocardial Infarction Maternal Grandmother      Skin Cancer Maternal Grandfather      Diabetes Maternal Grandfather      Myocardial Infarction Maternal Grandfather      Cancer Paternal Grandmother      No Known Problems Paternal Grandfather      Sudden Infant Death Syndrome Sister      No Known Problems Half-Brother      No Known Problems Half-Brother      No Known Problems Brother      No Known Problems Brother      No Known Problems Brother        Prior to Admission Medications   Prior to Admission Medications   Prescriptions Last Dose Informant Patient Reported? Taking?   Prenatal Vit-Fe Fumarate-FA (PRENATAL MULTIVITAMIN W/IRON) 27-0.8 MG tablet 2/26/2021 at 1940  No Yes   Sig: Take 1 tablet by mouth daily   acetaminophen (TYLENOL) 325 MG tablet Unknown at Unknown time  No No   Sig: Take 2 tablets (650 mg) by mouth every 4 hours as needed for mild pain or fever (greater than or equal to 38  C /100.4  F (oral) or 38.5  C/ 101.4  F (core).)   enoxaparin ANTICOAGULANT (LOVENOX) 40 MG/0.4ML syringe 2/26/2021 at 1940  No Yes   Sig: Inject 0.4 mLs (40 mg) Subcutaneous every 24 hours   ferrous sulfate (FEROSUL) 325 (65 Fe) MG tablet 2/26/2021 at 1940  No Yes   Sig: Take 1 tablet (325 mg) by mouth daily   ibuprofen (ADVIL/MOTRIN) 600 MG tablet Unknown at Unknown time  No No   Sig: Take 1 tablet (600 mg) by mouth every 6 hours as needed for moderate pain   lanolin ointment Unknown at Unknown time  No No   Sig: Use as needed for dry, cracked or sore nipples. No need to wash off.   senna-docusate (SENOKOT-S/PERICOLACE) 8.6-50 MG tablet Unknown at Unknown time  No No   Sig: Take 1 tablet by mouth 2 times  daily as needed for constipation      Facility-Administered Medications: None     Allergies   No Known Allergies    Physical Exam   Vital Signs: Temp: 97.8  F (36.6  C) Temp src: Oral BP: (!) 141/100 Pulse: 71   Resp: 16 SpO2: 100 % O2 Device: None (Room air)    Weight: 173 lbs 0 oz      Constitutional: awake, alert, cooperative, no apparent distress   Eyes: Lids and lashes normal, pupils equal, round and reactive to light, extra ocular muscles intact, sclera clear, conjunctiva normal  NOSE: Normal without discharge.  ENT: Normocephalic, without obvious abnormality, atraumatic, oral pharynx with moist mucous membranes, tonsils without erythema or exudates.   Respiratory: No respiratory distress, lungs clear  Cardiovascular:  Normal apical impulse, regular rate and rhythm   GI: Normal bowel sounds, soft, non-distended, mild tenderness in right upper quadrant, left upper quadrant.  No guarding or rebound tenderness   Skin: normal skin color, texture, turgor  Musculoskeletal: There is no redness, warmth, or swelling of the joints.  Full range of motion noted.  Motor strength is 5 out of 5 all extremities bilaterally.  Tone is normal.  Neurologic: Awake, alert, oriented to name, place and time.  Cranial nerves II-XII are grossly intact.   Psychiatric: Judgment intact.  No evidence of agitation       Data   Data reviewed today: I reviewed all medications, new labs and imaging results over the last 24 hours.     Recent Labs   Lab 02/27/21  1408 02/26/21  1149   WBC 13.6* 8.6   HGB 12.9 11.0*   MCV 93 94    367   INR 0.98  --     140   POTASSIUM 3.7 3.9   CHLORIDE 108 109   CO2 30 27   BUN 12 14   CR 0.74 0.68   ANIONGAP 2* 4   RENE 9.1 9.0   * 91   ALBUMIN 3.7 3.2*   PROTTOTAL 7.7 7.1   BILITOTAL 1.2 0.3   ALKPHOS 328* 195*   * 89*   * 127*   LIPASE 22,945*  --    TROPI  --  <0.015     Recent Results (from the past 24 hour(s))   Abdomen US, limited (RUQ only)    Narrative    US ABDOMEN  LIMITED 2/27/2021 3:19 PM    CLINICAL HISTORY: Right upper quadrant abdominal pain, vomiting,  elevated LFTS, right-sided abdominal pain, vomiting, elevated LFTs.    TECHNIQUE: Limited abdominal ultrasound.    COMPARISON: None.    FINDINGS:    GALLBLADDER: Gallstones are present in the gallbladder and gallbladder  wall appears thickened measuring 4 mm. Gallbladder appears somewhat  decompressed.    BILE DUCTS: There is no biliary dilatation. The common duct measures 5  mm.    LIVER: The liver is increased in echogenicity without focal mass.     RIGHT KIDNEY: Unremarkable.    PANCREAS: Predominantly obscured by bowel gas and shadowing from the  gallbladder stones.    No ascites.      Impression    IMPRESSION:  1.  Cholelithiasis with ultrasound findings concerning for acute  cholecystitis but gallbladder may be mildly decompressed. No bile duct  dilatation.    2.  Probable fatty infiltration of the liver.    GURMEET HUGHES MD   CT ABDOMEN PELVIS W CONTRAST    Narrative    CT ABDOMEN PELVIS WITH CONTRAST 2/27/2021 3:55 PM    CLINICAL HISTORY: Right-sided abdominal pain and vomiting    TECHNIQUE: CT scan of the abdomen and pelvis was performed following  injection of IV contrast. Multiplanar reformats were obtained. Dose  reduction techniques were used.  CONTRAST: 84mL [id number]    COMPARISON: None.    FINDINGS:   LOWER CHEST: No infiltrates or effusions.    HEPATOBILIARY: No significant mass or bile duct dilatation. No  calcified gallstones.     PANCREAS: Peripancreatic fluid and enlargement of the pancreas  concerning for pancreatitis.    SPLEEN: Normal size.    ADRENAL GLANDS: No significant nodules.    KIDNEYS/BLADDER: No significant mass, stones, or hydronephrosis.    BOWEL: No obstruction or inflammatory change.    PELVIC ORGANS: Irregular thickening of the endometrium, consider  pelvic ultrasound for further evaluation. This can be done as an  outpatient. No mass.    ADDITIONAL FINDINGS: Peripancreatic fluid and a  small amount of free  fluid noted. No free air.    MUSCULOSKELETAL: Survey of the visualized bony structures demonstrates  no destructive bony lesions.      Impression    IMPRESSION: Peripancreatic fluid and pancreatic fullness concerning  for pancreatitis.    BAUTISTA SOARES MD

## 2021-02-27 NOTE — ED PROVIDER NOTES
History     Chief Complaint   Patient presents with     Abdominal Pain     HPI  Jyoti New is a 20 year old female who presents back to the emergency department with recurrent abdominal pain.  Patient was seen yesterday for the same problem.  Patient had lab work that did show abnormal liver function test but no further testing was done.  Patient was feeling better and was discharged home.  She states that she was doing fine last night and then again this morning.  When she went to eat this afternoon, the pain started pretty severely within 20 to 30 minutes after eating.  This is a similar presentation to yesterday also.  She describes the pain as upper abdominal pain that kind of goes into her chest a little bit.  Denies any fevers or chills.  She has had some nausea and vomiting since this started.  Last bowel movement was earlier today and was normal.  Denies any dysuria or hematuria.  Patient recently had a baby 20 days ago.  Patient was diagnosed positive with COVID-19 which was an asymptomatic screen, 20 days ago.  It seems eating makes the pain worse, pain is described as a constant, nagging pain.      Allergies:  No Known Allergies    Problem List:    Patient Active Problem List    Diagnosis Date Noted     Pregnancy induced hypertension, antepartum 02/11/2021     Priority: Medium     Normal labor 02/06/2021     Priority: Medium     Labor and delivery, indication for care 02/01/2021     Priority: Medium     High-risk pregnancy supervision, unspecified trimester 12/09/2020     Priority: Medium        Past Medical History:    Past Medical History:   Diagnosis Date     Known health problems: none        Past Surgical History:    Past Surgical History:   Procedure Laterality Date     NO HISTORY OF SURGERY         Family History:    Family History   Problem Relation Age of Onset     No Known Problems Mother      Diabetes Father      Emphysema Father      No Known Problems Sister      Abdominal Aortic  Aneurysm Brother      Diabetes Maternal Grandmother      Myocardial Infarction Maternal Grandmother      Skin Cancer Maternal Grandfather      Diabetes Maternal Grandfather      Myocardial Infarction Maternal Grandfather      Cancer Paternal Grandmother      No Known Problems Paternal Grandfather      Sudden Infant Death Syndrome Sister      No Known Problems Half-Brother      No Known Problems Half-Brother      No Known Problems Brother      No Known Problems Brother      No Known Problems Brother        Social History:  Marital Status:  Single [1]  Social History     Tobacco Use     Smoking status: Current Every Day Smoker     Packs/day: 0.25     Smokeless tobacco: Never Used   Substance Use Topics     Alcohol use: Yes     Frequency: Never     Binge frequency: Never     Comment: occ     Drug use: Never        Medications:    acetaminophen (TYLENOL) 325 MG tablet  enoxaparin ANTICOAGULANT (LOVENOX) 40 MG/0.4ML syringe  ferrous sulfate (FEROSUL) 325 (65 Fe) MG tablet  ibuprofen (ADVIL/MOTRIN) 600 MG tablet  lanolin ointment  Prenatal Vit-Fe Fumarate-FA (PRENATAL MULTIVITAMIN W/IRON) 27-0.8 MG tablet  senna-docusate (SENOKOT-S/PERICOLACE) 8.6-50 MG tablet          Review of Systems   All other systems reviewed and are negative.      Physical Exam   BP: (!) 141/100  Pulse: 71  Temp: 97.8  F (36.6  C)  Resp: 16  Weight: 78.5 kg (173 lb)  SpO2: 100 %      Physical Exam  Vitals signs and nursing note reviewed.   Constitutional:       General: She is not in acute distress.     Appearance: She is well-developed. She is not diaphoretic.   HENT:      Head: Normocephalic and atraumatic.      Nose: Nose normal.      Mouth/Throat:      Pharynx: No oropharyngeal exudate.   Eyes:      Conjunctiva/sclera: Conjunctivae normal.   Neck:      Musculoskeletal: Normal range of motion and neck supple.   Cardiovascular:      Rate and Rhythm: Normal rate and regular rhythm.      Heart sounds: Normal heart sounds. No murmur. No friction  rub.   Pulmonary:      Effort: Pulmonary effort is normal. No respiratory distress.      Breath sounds: Normal breath sounds. No stridor. No wheezing or rales.   Abdominal:      General: Bowel sounds are normal. There is no distension.      Palpations: Abdomen is soft. There is no mass.      Tenderness: There is abdominal tenderness in the right upper quadrant, right lower quadrant, epigastric area and left upper quadrant. There is no guarding.   Musculoskeletal: Normal range of motion.         General: No tenderness.   Skin:     General: Skin is warm and dry.      Capillary Refill: Capillary refill takes less than 2 seconds.      Findings: No erythema.   Neurological:      Mental Status: She is alert and oriented to person, place, and time.   Psychiatric:         Judgment: Judgment normal.         ED Course        Procedures        Results for orders placed or performed during the hospital encounter of 02/27/21 (from the past 24 hour(s))   CBC with platelets differential   Result Value Ref Range    WBC 13.6 (H) 4.0 - 11.0 10e9/L    RBC Count 4.23 3.8 - 5.2 10e12/L    Hemoglobin 12.9 11.7 - 15.7 g/dL    Hematocrit 39.4 35.0 - 47.0 %    MCV 93 78 - 100 fl    MCH 30.5 26.5 - 33.0 pg    MCHC 32.7 31.5 - 36.5 g/dL    RDW 12.5 10.0 - 15.0 %    Platelet Count 372 150 - 450 10e9/L    Diff Method Automated Method     % Neutrophils 88.6 %    % Lymphocytes 6.0 %    % Monocytes 3.8 %    % Eosinophils 0.8 %    % Basophils 0.4 %    % Immature Granulocytes 0.4 %    Nucleated RBCs 0 0 /100    Absolute Neutrophil 12.0 (H) 1.6 - 8.3 10e9/L    Absolute Lymphocytes 0.8 0.8 - 5.3 10e9/L    Absolute Monocytes 0.5 0.0 - 1.3 10e9/L    Absolute Basophils 0.1 0.0 - 0.2 10e9/L    Abs Immature Granulocytes 0.1 0 - 0.4 10e9/L    Absolute Nucleated RBC 0.0    Basic metabolic panel   Result Value Ref Range    Sodium 140 133 - 144 mmol/L    Potassium 3.7 3.4 - 5.3 mmol/L    Chloride 108 94 - 109 mmol/L    Carbon Dioxide 30 20 - 32 mmol/L    Anion  Gap 2 (L) 3 - 14 mmol/L    Glucose 132 (H) 70 - 99 mg/dL    Urea Nitrogen 12 7 - 30 mg/dL    Creatinine 0.74 0.52 - 1.04 mg/dL    GFR Estimate >90 >60 mL/min/[1.73_m2]    GFR Estimate If Black >90 >60 mL/min/[1.73_m2]    Calcium 9.1 8.5 - 10.1 mg/dL   Lipase   Result Value Ref Range    Lipase 22,945 (H) 73 - 393 U/L   Hepatic panel   Result Value Ref Range    Bilirubin Direct 0.7 (H) 0.0 - 0.2 mg/dL    Bilirubin Total 1.2 0.2 - 1.3 mg/dL    Albumin 3.7 3.4 - 5.0 g/dL    Protein Total 7.7 6.8 - 8.8 g/dL    Alkaline Phosphatase 328 (H) 40 - 150 U/L     (HH) 0 - 50 U/L     (H) 0 - 45 U/L   INR   Result Value Ref Range    INR 0.98 0.86 - 1.14   Partial thromboplastin time   Result Value Ref Range    PTT 28 22 - 37 sec   CRP inflammation   Result Value Ref Range    CRP Inflammation <2.9 0.0 - 8.0 mg/L   UA reflex to Microscopic and Culture    Specimen: Midstream Urine   Result Value Ref Range    Color Urine Yellow     Appearance Urine Clear     Glucose Urine Negative NEG^Negative mg/dL    Bilirubin Urine Negative NEG^Negative    Ketones Urine Negative NEG^Negative mg/dL    Specific Gravity Urine 1.010 1.003 - 1.035    Blood Urine Moderate (A) NEG^Negative    pH Urine 5.0 5.0 - 7.0 pH    Protein Albumin Urine Negative NEG^Negative mg/dL    Urobilinogen mg/dL 4.0 (H) 0.0 - 2.0 mg/dL    Nitrite Urine Negative NEG^Negative    Leukocyte Esterase Urine Moderate (A) NEG^Negative    Source Midstream Urine     RBC Urine 16 (H) 0 - 2 /HPF    WBC Urine 21 (H) 0 - 5 /HPF    Squamous Epithelial /HPF Urine 1 0 - 1 /HPF    Mucous Urine Present (A) NEG^Negative /LPF   Abdomen US, limited (RUQ only)    Narrative    US ABDOMEN LIMITED 2/27/2021 3:19 PM    CLINICAL HISTORY: Right upper quadrant abdominal pain, vomiting,  elevated LFTS, right-sided abdominal pain, vomiting, elevated LFTs.    TECHNIQUE: Limited abdominal ultrasound.    COMPARISON: None.    FINDINGS:    GALLBLADDER: Gallstones are present in the gallbladder and  gallbladder  wall appears thickened measuring 4 mm. Gallbladder appears somewhat  decompressed.    BILE DUCTS: There is no biliary dilatation. The common duct measures 5  mm.    LIVER: The liver is increased in echogenicity without focal mass.     RIGHT KIDNEY: Unremarkable.    PANCREAS: Predominantly obscured by bowel gas and shadowing from the  gallbladder stones.    No ascites.      Impression    IMPRESSION:  1.  Cholelithiasis with ultrasound findings concerning for acute  cholecystitis but gallbladder may be mildly decompressed. No bile duct  dilatation.    2.  Probable fatty infiltration of the liver.    GURMEET HUGHES MD       Medications   ondansetron (ZOFRAN) injection 4 mg (4 mg Intravenous Given 2/27/21 1416)   HYDROmorphone (PF) (DILAUDID) injection 0.5 mg (has no administration in time range)   0.9% sodium chloride BOLUS (has no administration in time range)     Followed by   sodium chloride 0.9% infusion (has no administration in time range)   ketorolac (TORADOL) injection 30 mg (30 mg Intravenous Given 2/27/21 1416)   sodium chloride (PF) 0.9% PF flush 3 mL (3 mLs Intracatheter Given 2/27/21 1548)   iopamidol (ISOVUE-370) solution 500 mL (84 mLs Intravenous Given 2/27/21 1547)   new 100 ml saline bag (100 mLs Intravenous Given 2/27/21 1548)     Labs are reviewed and patient's liver function tests do seem a little bit worse the patient has a relatively normal total bilirubin level.  Lipase was checked today and does show a significant elevation of almost 23,000.  Ultrasound shows multiple gallstones and some questionable wall thickening.  Patient appears to have gallstone pancreatitis.  I discussed the case with general surgery, Dr. Devries well.  He does not think that we need to transfer the patient somewhere for an ERCP.  His recommendation would be to admit the patient to the hospital to treat for acute pancreatitis.  Once things calm down would plan on having surgery to have the gallbladder removed  before discharge.  He will be available to see the patient tomorrow.  I did discuss the questionable cholecystitis and should restart antibiotics but he does not recommend starting antibiotics at this time.  We will discussed the case with the hospitalist and plan on admission.      Assessments & Plan (with Medical Decision Making)  Gallstone pancreatitis     I have reviewed the nursing notes.    I have reviewed the findings, diagnosis, plan and need for follow up with the patient.              2/27/2021   [facility] EMERGENCY DEPT     Quinton Luther MD  02/27/21 [id number]

## 2021-02-28 ENCOUNTER — APPOINTMENT (OUTPATIENT)
Dept: GENERAL RADIOLOGY | Facility: CLINIC | Age: 21
DRG: 769 | End: 2021-02-28
Attending: NURSE PRACTITIONER
Payer: COMMERCIAL

## 2021-02-28 LAB
ALBUMIN SERPL-MCNC: 2.7 G/DL (ref 3.4–5)
ALBUMIN SERPL-MCNC: 2.8 G/DL (ref 3.4–5)
ALBUMIN SERPL-MCNC: 2.8 G/DL (ref 3.4–5)
ALP SERPL-CCNC: 193 U/L (ref 40–150)
ALP SERPL-CCNC: 219 U/L (ref 40–150)
ALP SERPL-CCNC: 222 U/L (ref 40–150)
ALT SERPL W P-5'-P-CCNC: 268 U/L (ref 0–50)
ALT SERPL W P-5'-P-CCNC: 301 U/L (ref 0–50)
ALT SERPL W P-5'-P-CCNC: 351 U/L (ref 0–50)
ANION GAP SERPL CALCULATED.3IONS-SCNC: 4 MMOL/L (ref 3–14)
AST SERPL W P-5'-P-CCNC: 121 U/L (ref 0–45)
AST SERPL W P-5'-P-CCNC: 128 U/L (ref 0–45)
AST SERPL W P-5'-P-CCNC: 171 U/L (ref 0–45)
BILIRUB DIRECT SERPL-MCNC: 0.1 MG/DL (ref 0–0.2)
BILIRUB DIRECT SERPL-MCNC: 0.2 MG/DL (ref 0–0.2)
BILIRUB DIRECT SERPL-MCNC: <0.1 MG/DL (ref 0–0.2)
BILIRUB SERPL-MCNC: 0.5 MG/DL (ref 0.2–1.3)
BILIRUB SERPL-MCNC: 0.5 MG/DL (ref 0.2–1.3)
BILIRUB SERPL-MCNC: 0.6 MG/DL (ref 0.2–1.3)
BUN SERPL-MCNC: 14 MG/DL (ref 7–30)
CALCIUM SERPL-MCNC: 8.3 MG/DL (ref 8.5–10.1)
CHLORIDE SERPL-SCNC: 112 MMOL/L (ref 94–109)
CO2 SERPL-SCNC: 26 MMOL/L (ref 20–32)
CREAT SERPL-MCNC: 0.66 MG/DL (ref 0.52–1.04)
ERYTHROCYTE [DISTWIDTH] IN BLOOD BY AUTOMATED COUNT: 12.7 % (ref 10–15)
GFR SERPL CREATININE-BSD FRML MDRD: >90 ML/MIN/{1.73_M2}
GLUCOSE SERPL-MCNC: 91 MG/DL (ref 70–99)
HCT VFR BLD AUTO: 31.1 % (ref 35–47)
HGB BLD-MCNC: 9.8 G/DL (ref 11.7–15.7)
LIPASE SERPL-CCNC: 2787 U/L (ref 73–393)
MCH RBC QN AUTO: 30.2 PG (ref 26.5–33)
MCHC RBC AUTO-ENTMCNC: 31.5 G/DL (ref 31.5–36.5)
MCV RBC AUTO: 96 FL (ref 78–100)
PLATELET # BLD AUTO: 290 10E9/L (ref 150–450)
POTASSIUM SERPL-SCNC: 3.6 MMOL/L (ref 3.4–5.3)
PROT SERPL-MCNC: 6 G/DL (ref 6.8–8.8)
PROT SERPL-MCNC: 6 G/DL (ref 6.8–8.8)
PROT SERPL-MCNC: 6.1 G/DL (ref 6.8–8.8)
RBC # BLD AUTO: 3.25 10E12/L (ref 3.8–5.2)
SODIUM SERPL-SCNC: 142 MMOL/L (ref 133–144)
WBC # BLD AUTO: 6 10E9/L (ref 4–11)

## 2021-02-28 PROCEDURE — 250N000011 HC RX IP 250 OP 636: Performed by: FAMILY MEDICINE

## 2021-02-28 PROCEDURE — 120N000001 HC R&B MED SURG/OB

## 2021-02-28 PROCEDURE — 250N000009 HC RX 250

## 2021-02-28 PROCEDURE — 99233 SBSQ HOSP IP/OBS HIGH 50: CPT | Performed by: NURSE PRACTITIONER

## 2021-02-28 PROCEDURE — 36415 COLL VENOUS BLD VENIPUNCTURE: CPT | Performed by: NURSE PRACTITIONER

## 2021-02-28 PROCEDURE — 71045 X-RAY EXAM CHEST 1 VIEW: CPT

## 2021-02-28 PROCEDURE — 85027 COMPLETE CBC AUTOMATED: CPT | Performed by: NURSE PRACTITIONER

## 2021-02-28 PROCEDURE — 258N000003 HC RX IP 258 OP 636: Performed by: NURSE PRACTITIONER

## 2021-02-28 PROCEDURE — 90686 IIV4 VACC NO PRSV 0.5 ML IM: CPT | Performed by: FAMILY MEDICINE

## 2021-02-28 PROCEDURE — 83690 ASSAY OF LIPASE: CPT | Performed by: FAMILY MEDICINE

## 2021-02-28 PROCEDURE — 82248 BILIRUBIN DIRECT: CPT | Performed by: NURSE PRACTITIONER

## 2021-02-28 PROCEDURE — 80076 HEPATIC FUNCTION PANEL: CPT | Performed by: NURSE PRACTITIONER

## 2021-02-28 PROCEDURE — 250N000011 HC RX IP 250 OP 636: Performed by: NURSE PRACTITIONER

## 2021-02-28 PROCEDURE — G0008 ADMIN INFLUENZA VIRUS VAC: HCPCS | Performed by: FAMILY MEDICINE

## 2021-02-28 PROCEDURE — 80053 COMPREHEN METABOLIC PANEL: CPT | Performed by: NURSE PRACTITIONER

## 2021-02-28 PROCEDURE — 99222 1ST HOSP IP/OBS MODERATE 55: CPT | Mod: 57 | Performed by: SURGERY

## 2021-02-28 RX ORDER — CEFAZOLIN SODIUM 2 G/100ML
2 INJECTION, SOLUTION INTRAVENOUS
Status: COMPLETED | OUTPATIENT
Start: 2021-03-01 | End: 2021-03-01

## 2021-02-28 RX ORDER — LIDOCAINE HYDROCHLORIDE 10 MG/ML
INJECTION, SOLUTION EPIDURAL; INFILTRATION; INTRACAUDAL; PERINEURAL
Status: COMPLETED
Start: 2021-02-28 | End: 2021-02-28

## 2021-02-28 RX ADMIN — INFLUENZA A VIRUS A/GUANGDONG-MAONAN/SWL1536/2019 CNIC-1909 (H1N1) ANTIGEN (FORMALDEHYDE INACTIVATED), INFLUENZA A VIRUS A/HONG KONG/2671/2019 (H3N2) ANTIGEN (FORMALDEHYDE INACTIVATED), INFLUENZA B VIRUS B/PHUKET/3073/2013 ANTIGEN (FORMALDEHYDE INACTIVATED), AND INFLUENZA B VIRUS B/WASHINGTON/02/2019 ANTIGEN (FORMALDEHYDE INACTIVATED) 0.5 ML: 15; 15; 15; 15 INJECTION, SUSPENSION INTRAMUSCULAR at 10:08

## 2021-02-28 RX ADMIN — LIDOCAINE HYDROCHLORIDE 0.1 ML: 10 INJECTION, SOLUTION EPIDURAL; INFILTRATION; INTRACAUDAL; PERINEURAL at 03:56

## 2021-02-28 RX ADMIN — KETOROLAC TROMETHAMINE 30 MG: 30 INJECTION, SOLUTION INTRAMUSCULAR at 09:12

## 2021-02-28 RX ADMIN — KETOROLAC TROMETHAMINE 30 MG: 30 INJECTION, SOLUTION INTRAMUSCULAR at 15:12

## 2021-02-28 RX ADMIN — KETOROLAC TROMETHAMINE 30 MG: 30 INJECTION, SOLUTION INTRAMUSCULAR at 22:01

## 2021-02-28 RX ADMIN — SODIUM CHLORIDE, POTASSIUM CHLORIDE, SODIUM LACTATE AND CALCIUM CHLORIDE: 600; 310; 30; 20 INJECTION, SOLUTION INTRAVENOUS at 15:12

## 2021-02-28 RX ADMIN — SODIUM CHLORIDE, POTASSIUM CHLORIDE, SODIUM LACTATE AND CALCIUM CHLORIDE: 600; 310; 30; 20 INJECTION, SOLUTION INTRAVENOUS at 07:30

## 2021-02-28 RX ADMIN — SODIUM CHLORIDE, POTASSIUM CHLORIDE, SODIUM LACTATE AND CALCIUM CHLORIDE: 600; 310; 30; 20 INJECTION, SOLUTION INTRAVENOUS at 22:01

## 2021-02-28 RX ADMIN — HYDROMORPHONE HYDROCHLORIDE 0.5 MG: 1 INJECTION, SOLUTION INTRAMUSCULAR; INTRAVENOUS; SUBCUTANEOUS at 03:55

## 2021-02-28 RX ADMIN — SODIUM CHLORIDE, POTASSIUM CHLORIDE, SODIUM LACTATE AND CALCIUM CHLORIDE 150 ML/HR: 600; 310; 30; 20 INJECTION, SOLUTION INTRAVENOUS at 00:07

## 2021-02-28 NOTE — CONSULTS
Patient seen in consultation for gallstone pancreatitis    HPI:  Patient is a 20 year old female hospitalized yesterday with gallstone pancreatitis. She just had a little girl 3 weeks ago. She was unaware that she had gallstones. She has never had abdominal surgery. She feels much better today and her labs are all improving. Bilirubin normal, CBD 5mm on US.     Review Of Systems    Skin: negative  Ears/Nose/Throat: negative  Respiratory: No shortness of breath, dyspnea on exertion, cough, or hemoptysis  Cardiovascular: negative  Gastrointestinal: as above  Genitourinary: as above  Musculoskeletal: negative  Neurologic: negative  Hematologic/Lymphatic/Immunologic: negative  Endocrine: negative      Past Medical History:   Diagnosis Date     Cholelithiasis 2/27/2021     Known health problems: none        Past Surgical History:   Procedure Laterality Date     NO HISTORY OF SURGERY         Family History   Problem Relation Age of Onset     No Known Problems Mother      Diabetes Father      Emphysema Father      No Known Problems Sister      Abdominal Aortic Aneurysm Brother      Diabetes Maternal Grandmother      Myocardial Infarction Maternal Grandmother      Skin Cancer Maternal Grandfather      Diabetes Maternal Grandfather      Myocardial Infarction Maternal Grandfather      Cancer Paternal Grandmother      No Known Problems Paternal Grandfather      Sudden Infant Death Syndrome Sister      No Known Problems Half-Brother      No Known Problems Half-Brother      No Known Problems Brother      No Known Problems Brother      No Known Problems Brother        Social History     Socioeconomic History     Marital status: Single     Spouse name: Not on file     Number of children: Not on file     Years of education: Not on file     Highest education level: Not on file   Occupational History     Not on file   Social Needs     Financial resource strain: Not on file     Food insecurity     Worry: Not on file     Inability: Not  on file     Transportation needs     Medical: Not on file     Non-medical: Not on file   Tobacco Use     Smoking status: Current Every Day Smoker     Packs/day: 0.25     Smokeless tobacco: Never Used   Substance and Sexual Activity     Alcohol use: Yes     Frequency: Never     Binge frequency: Never     Comment: occ     Drug use: Never     Sexual activity: Not on file   Lifestyle     Physical activity     Days per week: Not on file     Minutes per session: Not on file     Stress: Not on file   Relationships     Social connections     Talks on phone: Not on file     Gets together: Not on file     Attends [employer] service: Not on file     Active member of club or organization: Not on file     Attends meetings of clubs or organizations: Not on file     Relationship status: Not on file     Intimate partner violence     Fear of current or ex partner: Not on file     Emotionally abused: Not on file     Physically abused: Not on file     Forced sexual activity: Not on file   Other Topics Concern     Not on file   Social History Narrative    11/2020  Lives in [place] with her parents and her brother.  Jyoti smokes 1 PPD.  She is in a relationship with Thomas.  No indoor cats/kittens.  No concerns about domestic violence.  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       No current outpatient medications on file.       Medications and history reviewed    Physical exam:  Vitals: /54   Pulse 54   Temp 97.7  F (36.5  C) (Oral)   Resp 16   Wt 78.5 kg (173 lb)   SpO2 100%   Breastfeeding Yes   BMI 26.15 kg/m    BMI= Body mass index is 26.15 kg/m .    Constitutional: Healthy, alert, non-distressed   Head: Normo-cephalic, atraumatic, no lesions, masses or tenderness   Cardiovascular: RRR, no new murmurs, +S1, +S2 heart sounds, no clicks, rubs or gallops   Respiratory: CTAB, no rales, rhonchi or wheezing, equal chest rise, good respiratory effort   Gastrointestinal: Soft, mild epigastric tenderness, non  distended, no rebound rigidity or guarding, no masses or hernias palpated   : Deferred  Musculoskeletal: Moves all extremities, normal  strength, no deformities noted   Skin: No suspicious lesions or rashes   Psychiatric: Mentation appears normal, affect appropriate   Hematologic/Lymphatic/Immunologic: Normal cervical and supraclavicular lymph nodes   Patient able to get up on table without difficulty.    Labs show:  Results for orders placed or performed during the hospital encounter of 02/27/21 (from the past 24 hour(s))   CT ABDOMEN PELVIS W CONTRAST    Narrative    CT ABDOMEN PELVIS WITH CONTRAST 2/27/2021 3:55 PM    CLINICAL HISTORY: Right-sided abdominal pain and vomiting    TECHNIQUE: CT scan of the abdomen and pelvis was performed following  injection of IV contrast. Multiplanar reformats were obtained. Dose  reduction techniques were used.  CONTRAST: 84mL Isovue-370    COMPARISON: None.    FINDINGS:   LOWER CHEST: No infiltrates or effusions.    HEPATOBILIARY: No significant mass or bile duct dilatation. No  calcified gallstones.     PANCREAS: Peripancreatic fluid and enlargement of the pancreas  concerning for pancreatitis.    SPLEEN: Normal size.    ADRENAL GLANDS: No significant nodules.    KIDNEYS/BLADDER: No significant mass, stones, or hydronephrosis.    BOWEL: No obstruction or inflammatory change.    PELVIC ORGANS: Irregular thickening of the endometrium, consider  pelvic ultrasound for further evaluation. This can be done as an  outpatient. No mass.    ADDITIONAL FINDINGS: Peripancreatic fluid and a small amount of free  fluid noted. No free air.    MUSCULOSKELETAL: Survey of the visualized bony structures demonstrates  no destructive bony lesions.      Impression    IMPRESSION: Peripancreatic fluid and pancreatic fullness concerning  for pancreatitis.    BAUTISTA SOARES MD   Blood culture    Specimen: Blood    Right Arm   Result Value Ref Range    Specimen Description Blood Right Arm      Culture Micro No growth after 17 hours    Hepatic panel   Result Value Ref Range    Bilirubin Direct 0.2 0.0 - 0.2 mg/dL    Bilirubin Total 0.5 0.2 - 1.3 mg/dL    Albumin 3.2 (L) 3.4 - 5.0 g/dL    Protein Total 6.7 (L) 6.8 - 8.8 g/dL    Alkaline Phosphatase 285 (H) 40 - 150 U/L     (H) 0 - 50 U/L     (H) 0 - 45 U/L   Lactic acid whole blood   Result Value Ref Range    Lactic Acid 0.6 (L) 0.7 - 2.0 mmol/L   Blood culture    Specimen: Blood    Left Hand   Result Value Ref Range    Specimen Description Blood Left Hand     Culture Micro No growth after 17 hours    Hepatic panel   Result Value Ref Range    Bilirubin Direct 0.1 0.0 - 0.2 mg/dL    Bilirubin Total 0.5 0.2 - 1.3 mg/dL    Albumin 2.8 (L) 3.4 - 5.0 g/dL    Protein Total 6.1 (L) 6.8 - 8.8 g/dL    Alkaline Phosphatase 222 (H) 40 - 150 U/L     (H) 0 - 50 U/L     (H) 0 - 45 U/L   Comprehensive metabolic panel   Result Value Ref Range    Sodium 142 133 - 144 mmol/L    Potassium 3.6 3.4 - 5.3 mmol/L    Chloride 112 (H) 94 - 109 mmol/L    Carbon Dioxide 26 20 - 32 mmol/L    Anion Gap 4 3 - 14 mmol/L    Glucose 91 70 - 99 mg/dL    Urea Nitrogen 14 7 - 30 mg/dL    Creatinine 0.66 0.52 - 1.04 mg/dL    GFR Estimate >90 >60 mL/min/[1.73_m2]    GFR Estimate If Black >90 >60 mL/min/[1.73_m2]    Calcium 8.3 (L) 8.5 - 10.1 mg/dL    Bilirubin Total 0.5 0.2 - 1.3 mg/dL    Albumin 2.8 (L) 3.4 - 5.0 g/dL    Protein Total 6.0 (L) 6.8 - 8.8 g/dL    Alkaline Phosphatase 219 (H) 40 - 150 U/L     (H) 0 - 50 U/L     (H) 0 - 45 U/L   CBC with platelets   Result Value Ref Range    WBC 6.0 4.0 - 11.0 10e9/L    RBC Count 3.25 (L) 3.8 - 5.2 10e12/L    Hemoglobin 9.8 (L) 11.7 - 15.7 g/dL    Hematocrit 31.1 (L) 35.0 - 47.0 %    MCV 96 78 - 100 fl    MCH 30.2 26.5 - 33.0 pg    MCHC 31.5 31.5 - 36.5 g/dL    RDW 12.7 10.0 - 15.0 %    Platelet Count 290 150 - 450 10e9/L   Bilirubin direct   Result Value Ref Range    Bilirubin Direct 0.2 0.0 - 0.2 mg/dL    Lipase   Result Value Ref Range    Lipase 2,787 (H) 73 - 393 U/L   Hepatic panel   Result Value Ref Range    Bilirubin Direct <0.1 0.0 - 0.2 mg/dL    Bilirubin Total 0.6 0.2 - 1.3 mg/dL    Albumin 2.7 (L) 3.4 - 5.0 g/dL    Protein Total 6.0 (L) 6.8 - 8.8 g/dL    Alkaline Phosphatase 193 (H) 40 - 150 U/L     (H) 0 - 50 U/L     (H) 0 - 45 U/L   XR Chest Port 1 View    Narrative    CHEST ONE VIEW  2/28/2021 1:41 PM     HISTORY: Preoperative evaluation for gallstone pancreatitis.    COMPARISON: None.      Impression    IMPRESSION: No acute disease.    BAUTISTA SOARES MD       Imaging shows:  As above    Assessment:     ICD-10-CM    1. Gallstone pancreatitis  K85.10 Urine Culture Aerobic Bacterial     Hepatic panel     Blood culture     Blood culture     Lactic acid whole blood     COVID-19 Virus (Coronavirus) Antibody & Titer Reflex     Hepatic panel     Comprehensive metabolic panel     CBC with platelets     Bilirubin direct     Bilirubin direct     Hepatic panel     Lipase     Case Request: CHOLECYSTECTOMY, LAPAROSCOPIC, possible open     Case Request: CHOLECYSTECTOMY, LAPAROSCOPIC, possible open     CANCELED: Hepatic panel     CANCELED: Hepatic panel     CANCELED: Hepatic panel     Plan: Plan for lap tamie possible open tomorrow as long as continues to clinically improve. CLD ok for dinner and NPOpMN.     Discussed imaging findings and what to expect with surgery. Risks of bleeding, infection, anesthesia complications, conversion to open, injury to nearby structures and bile duct injury all discussed.     Scott Hearn, DO

## 2021-02-28 NOTE — PLAN OF CARE
S: Shift review   B:Jyoti is a  who delivered vaginally on 2021 at 38.3 wks  A: Pt admitted for acute Cholecystitis, Cholelithiasis and pancreatitis.  Labs every 6 hours.  Strict NPO, no ice or water either.  IV LR at 150cc/hr.  VSS, patient is independent with mobility, pain well controlled with IV (Toradol & Dilaudid x 2)pain meds. Handles baby with confidence.  R: Continue with routine PP care

## 2021-02-28 NOTE — PROGRESS NOTES
S: Admission  B: 3 weeks post partum, abdominal pain for previous 2 days.  A: VSS. Pain 5/10. Taking Toradol IV. LR infusing at 150cc/hour. Declines Dilaudid at this time. NPO. In good spirits. Oriented to POC.   R: Remain NPO, administer pain  meds IV prn. IV hydration.  Anticipate surgery Monday.

## 2021-02-28 NOTE — PROGRESS NOTES
[facility]    Medicine Progress Note - Hospitalist Service       Date of Admission:  2/27/2021  Assessment & Plan           Jyoti New is a 20 year old female admitted on 2/27/2021. She presented to the emergency department with recurrent abdominal pain.  Patient was seen yesterday, found to have abnormal liver functions but pain was improved and she was discharged home.  Patient tried to eat this afternoon and pain became severe within 20 minutes.  Patient with severe, cramping, sharp upper abdominal pain.  Patient denies fever, chills, nausea.  Patient last bowel movement today.  Patient denies dysuria or hematuria.  Patient is 20 days postpartum, normal vaginal delivery of healthy baby girl.  On that admission patient was diagnosed with COVID-19, asymptomatic.  Patient considered Covid recovered.  Patient with worsening hepatic panel, lipase elevated at 23,000.  Total bilirubin remained stable at 1.2.  Alk phos 195 to 328, ALT 89 to544,  to 406.  Bilirubin direct 0.7.  White blood cells minorly elevated at 13.6.  Patient was discussed with surgery who feels the elevated white blood cells are reactive, patient's pain has resolved without narcotics.  Patient likely has had a gallstone that has passed.  Patient will require surgical consultation and probable intervention this admission.  Urinalysis appears possibly infected, culture pending.  Abdominal ultrasound shows cholelithiasis with possible cholecystitis but gallbladder decompressed.  No bile duct dilation.  CT of the abdomen shows peripancreatic fluid concerning for pancreatitis, no bile duct dilatation or calcified gallstones.          Gallstone pancreatitis    Cholelithiasis  Assessment: Patient presented as above with worsening liver functions and cholelithiasis.  Patient's lipase decreased to 2000. Hepatic panel improving significantly, Alk phos 193, [id number],  . Patient doing well with pain management  today with Toradol, she did require IV dilaudid overnight. Planning for surgery tomorrow.   Plan:   - NPO, Ice chips ok  - IV fluids with LR at 150 ml/hr   - Hold on starting antibiotics  - Pre op evaluation today, CXR clear, EKG sinus bradycardia. Patient blood pressure has been stable. Patient has no family history of complications with anesthesia. Patient is medically cleared for surgery without further testing.       Acute cystitis - Possible  Assessment: Patient urinalysis appears to be possibly infected.  Moderate leukocyte esterase with 21 white blood cells.  Patient is asymptomatic, afebrile and hemodynamically stable.   Plan: Given lactation and breast-feeding will not start antibiotics and wait for urine culture unless patient worsens, develops fever, symptoms.       Postpartum care following vaginal delivery    Breast feeding status of mother  Assessment: Patient presented on February 6 at 38 and 3/7 weeks gestation, and had a normal spontaneous vaginal delivery.  Patient was Covid positive, had preeclampsia during hospitalization that has resolved and follow-up.  Patient with anemia, vaginal laceration.  Patient states bleeding has significantly diminished.  Patient is breast-feeding, states this is going well.   Plan:   - Postpartum care.    - Monitor medications given for lactation safety.       Covid recovered  Assessment : Covid positive on February 6, asymptomatic.  The week prior to this patient had symptoms but tested negative.  Patient had been on Lovenox last dose 2/ 26.   Plan: Test for antibodies.  Hold Lovenox for surgical intervention.          Diet: NPO for Medical/Clinical Reasons Except for: Ice Chips    DVT Prophylaxis: Pneumatic Compression Devices  Rodriguez Catheter: not present  Code Status: Full Code           Disposition Plan   Expected discharge: 2 - 3 days, recommended to prior living arrangement once recovered post surgical, pancreatitis resolved..  Entered: Mary Lee, CNP  02/28/2021, 11:28 AM       The patient's care was discussed with the Attending Physician, Dr. Gtz, Dr Hearn, Bedside Nurse, Care Coordinator/ and Patient.    Mary Lee, [facility]  Hospitalist Service  [facility]  Contact information available via [facility] Paging/Directory    ______________________________________________________________________    Interval History   Patient seen while sitting up in bed, alert, oriented. Patient has been afebrile, hemodynamically stable. Patient has no pain or nausea at this time, Toradol and Dilaudid working well. NPO, IVF infusing. Up independent.     Data reviewed today: I reviewed all medications, new labs and imaging results over the last 24 hours.    Physical Exam   Vital Signs: Temp: 98.3  F (36.8  C) Temp src: Oral BP: 105/44 Pulse: 58   Resp: 18 SpO2: 100 % O2 Device: None (Room air)    Weight: 173 lbs 0 oz  Constitutional: awake, alert, cooperative, no apparent distress   Respiratory: No respiratory distress, lungs clear  Cardiovascular:  Normal apical impulse, regular rate and rhythm   GI: Normal bowel sounds, soft, non-distended, mild tenderness in right upper quadrant, left upper quadrant.  No guarding or rebound tenderness   Skin: normal skin color, texture, turgor  Musculoskeletal: There is no redness, warmth, or swelling of the joints.  Full range of motion noted.  Motor strength is 5 out of 5 all extremities bilaterally.  Tone is normal.  Neurologic: Awake, alert, oriented to name, place and time.  Cranial nerves II-XII are grossly intact.   Psychiatric: Judgment intact.  No evidence of agitation     Data   Recent Labs   Lab 02/28/21  1208 02/28/21  0620 02/27/21  1408 02/27/21  1408 02/26/21  1149   WBC  --  6.0  --  13.6* 8.6   HGB  --  9.8*  --  12.9 11.0*   MCV  --  96  --  93 94   PLT  --  290  --  372 367   INR  --   --   --  0.98  --    NA  --  142  --  140 140   POTASSIUM  --  3.6  --  3.7 3.9   CHLORIDE   --  112*  --  108 109   CO2  --  26  --  30 27   BUN  --  14  --  12 14   CR  --  0.66  --  0.74 0.68   ANIONGAP  --  4  --  2* 4   RENE  --  8.3*  --  9.1 9.0   GLC  --  91  --  132* 91   ALBUMIN 2.7* 2.8*   < > 3.7 3.2*   PROTTOTAL 6.0* 6.0*   < > 7.7 7.1   BILITOTAL 0.6 0.5   < > 1.2 0.3   ALKPHOS 193* 219*   < > 328* 195*   * 301*   < > 544* 89*   * 128*   < > 406* 127*   LIPASE  --  2,787*  --  22,945*  --    TROPI  --   --   --   --  <0.015    < > = values in this interval not displayed.     Recent Results (from the past 24 hour(s))   Abdomen US, limited (RUQ only)    Narrative    US ABDOMEN LIMITED 2/27/2021 3:19 PM    CLINICAL HISTORY: Right upper quadrant abdominal pain, vomiting,  elevated LFTS, right-sided abdominal pain, vomiting, elevated LFTs.    TECHNIQUE: Limited abdominal ultrasound.    COMPARISON: None.    FINDINGS:    GALLBLADDER: Gallstones are present in the gallbladder and gallbladder  wall appears thickened measuring 4 mm. Gallbladder appears somewhat  decompressed.    BILE DUCTS: There is no biliary dilatation. The common duct measures 5  mm.    LIVER: The liver is increased in echogenicity without focal mass.     RIGHT KIDNEY: Unremarkable.    PANCREAS: Predominantly obscured by bowel gas and shadowing from the  gallbladder stones.    No ascites.      Impression    IMPRESSION:  1.  Cholelithiasis with ultrasound findings concerning for acute  cholecystitis but gallbladder may be mildly decompressed. No bile duct  dilatation.    2.  Probable fatty infiltration of the liver.    GURMEET HUGHES MD   CT ABDOMEN PELVIS W CONTRAST    Narrative    CT ABDOMEN PELVIS WITH CONTRAST 2/27/2021 3:55 PM    CLINICAL HISTORY: Right-sided abdominal pain and vomiting    TECHNIQUE: CT scan of the abdomen and pelvis was performed following  injection of IV contrast. Multiplanar reformats were obtained. Dose  reduction techniques were used.  CONTRAST: 84mL Isovue-370    COMPARISON: None.    FINDINGS:    LOWER CHEST: No infiltrates or effusions.    HEPATOBILIARY: No significant mass or bile duct dilatation. No  calcified gallstones.     PANCREAS: Peripancreatic fluid and enlargement of the pancreas  concerning for pancreatitis.    SPLEEN: Normal size.    ADRENAL GLANDS: No significant nodules.    KIDNEYS/BLADDER: No significant mass, stones, or hydronephrosis.    BOWEL: No obstruction or inflammatory change.    PELVIC ORGANS: Irregular thickening of the endometrium, consider  pelvic ultrasound for further evaluation. This can be done as an  outpatient. No mass.    ADDITIONAL FINDINGS: Peripancreatic fluid and a small amount of free  fluid noted. No free air.    MUSCULOSKELETAL: Survey of the visualized bony structures demonstrates  no destructive bony lesions.      Impression    IMPRESSION: Peripancreatic fluid and pancreatic fullness concerning  for pancreatitis.    BAUTISTA SOARES MD

## 2021-02-28 NOTE — PROGRESS NOTES
"1515 S: abdominal pain A:  Ketoralac given for RUQ abdominal pain rating\" 7/10\" Dr Hearn(Surgeon) here to assess pt.R: Will reassess pain in 30 minutes.  "

## 2021-02-28 NOTE — PLAN OF CARE
S: Shift note B:   on  PP 3 weeks out. Pancreatitis r/t gallstones A: VSS,  afebrile, RUQ pain still achiness  using only ketoralac, no dilaudid. Denies any nausea. Scant vag flow. SO here and supportive. Skarlett also here, BF and supplementing with formula. R: Awaiting surgical consult with possible cholecystectomy.

## 2021-03-01 ENCOUNTER — ANESTHESIA (OUTPATIENT)
Dept: SURGERY | Facility: CLINIC | Age: 21
DRG: 769 | End: 2021-03-01
Payer: COMMERCIAL

## 2021-03-01 ENCOUNTER — ANESTHESIA EVENT (OUTPATIENT)
Dept: SURGERY | Facility: CLINIC | Age: 21
DRG: 769 | End: 2021-03-01
Payer: COMMERCIAL

## 2021-03-01 VITALS
WEIGHT: 173 LBS | SYSTOLIC BLOOD PRESSURE: 127 MMHG | DIASTOLIC BLOOD PRESSURE: 78 MMHG | HEART RATE: 62 BPM | TEMPERATURE: 97.1 F | BODY MASS INDEX: 26.22 KG/M2 | OXYGEN SATURATION: 95 % | RESPIRATION RATE: 16 BRPM | HEIGHT: 68 IN

## 2021-03-01 LAB
ALBUMIN SERPL-MCNC: 2.7 G/DL (ref 3.4–5)
ALP SERPL-CCNC: 195 U/L (ref 40–150)
ALT SERPL W P-5'-P-CCNC: 210 U/L (ref 0–50)
ANION GAP SERPL CALCULATED.3IONS-SCNC: 5 MMOL/L (ref 3–14)
APTT IMM NP PPP: 40 SEC (ref 31–45)
APTT IMM NP PPP: 46 SEC (ref 31–45)
AST SERPL W P-5'-P-CCNC: 57 U/L (ref 0–45)
BILIRUB SERPL-MCNC: 0.4 MG/DL (ref 0.2–1.3)
BUN SERPL-MCNC: 13 MG/DL (ref 7–30)
CALCIUM SERPL-MCNC: 8.3 MG/DL (ref 8.5–10.1)
CHLORIDE SERPL-SCNC: 112 MMOL/L (ref 94–109)
CO2 SERPL-SCNC: 25 MMOL/L (ref 20–32)
CREAT SERPL-MCNC: 0.65 MG/DL (ref 0.52–1.04)
ERYTHROCYTE [DISTWIDTH] IN BLOOD BY AUTOMATED COUNT: 12.2 % (ref 10–15)
GFR SERPL CREATININE-BSD FRML MDRD: >90 ML/MIN/{1.73_M2}
GLUCOSE SERPL-MCNC: 87 MG/DL (ref 70–99)
HCT VFR BLD AUTO: 31.9 % (ref 35–47)
HGB BLD-MCNC: 10.1 G/DL (ref 11.7–15.7)
INR PPP: 1.06 (ref 0.86–1.14)
LIPASE SERPL-CCNC: 554 U/L (ref 73–393)
MCH RBC QN AUTO: 29.6 PG (ref 26.5–33)
MCHC RBC AUTO-ENTMCNC: 31.7 G/DL (ref 31.5–36.5)
MCV RBC AUTO: 94 FL (ref 78–100)
PLATELET # BLD AUTO: 255 10E9/L (ref 150–450)
POTASSIUM SERPL-SCNC: 3.8 MMOL/L (ref 3.4–5.3)
PROT SERPL-MCNC: 5.9 G/DL (ref 6.8–8.8)
RBC # BLD AUTO: 3.41 10E12/L (ref 3.8–5.2)
SARS-COV-2 AB PNL SERPL IA: POSITIVE
SARS-COV-2 IGG SERPL IA-ACNC: NORMAL
SODIUM SERPL-SCNC: 142 MMOL/L (ref 133–144)
WBC # BLD AUTO: 5 10E9/L (ref 4–11)

## 2021-03-01 PROCEDURE — 370N000017 HC ANESTHESIA TECHNICAL FEE, PER MIN: Performed by: SURGERY

## 2021-03-01 PROCEDURE — 83690 ASSAY OF LIPASE: CPT | Performed by: NURSE PRACTITIONER

## 2021-03-01 PROCEDURE — 360N000076 HC SURGERY LEVEL 3, PER MIN: Performed by: SURGERY

## 2021-03-01 PROCEDURE — 250N000011 HC RX IP 250 OP 636: Performed by: NURSE ANESTHETIST, CERTIFIED REGISTERED

## 2021-03-01 PROCEDURE — 250N000009 HC RX 250: Performed by: SURGERY

## 2021-03-01 PROCEDURE — 80053 COMPREHEN METABOLIC PANEL: CPT | Performed by: NURSE PRACTITIONER

## 2021-03-01 PROCEDURE — 88304 TISSUE EXAM BY PATHOLOGIST: CPT | Mod: TC | Performed by: SURGERY

## 2021-03-01 PROCEDURE — 47562 LAPAROSCOPIC CHOLECYSTECTOMY: CPT | Performed by: SURGERY

## 2021-03-01 PROCEDURE — 258N000003 HC RX IP 258 OP 636: Performed by: NURSE PRACTITIONER

## 2021-03-01 PROCEDURE — 85027 COMPLETE CBC AUTOMATED: CPT | Performed by: NURSE PRACTITIONER

## 2021-03-01 PROCEDURE — 272N000001 HC OR GENERAL SUPPLY STERILE: Performed by: SURGERY

## 2021-03-01 PROCEDURE — 250N000009 HC RX 250: Performed by: NURSE ANESTHETIST, CERTIFIED REGISTERED

## 2021-03-01 PROCEDURE — 250N000013 HC RX MED GY IP 250 OP 250 PS 637: Performed by: SURGERY

## 2021-03-01 PROCEDURE — 710N000012 HC RECOVERY PHASE 2, PER MINUTE: Performed by: SURGERY

## 2021-03-01 PROCEDURE — 85732 THROMBOPLASTIN TIME PARTIAL: CPT | Performed by: NURSE PRACTITIONER

## 2021-03-01 PROCEDURE — 999N001035 HC STATISTIC THROMBIN TIME NC: Performed by: NURSE PRACTITIONER

## 2021-03-01 PROCEDURE — 250N000011 HC RX IP 250 OP 636: Performed by: NURSE PRACTITIONER

## 2021-03-01 PROCEDURE — 250N000011 HC RX IP 250 OP 636: Performed by: SURGERY

## 2021-03-01 PROCEDURE — 88304 TISSUE EXAM BY PATHOLOGIST: CPT | Mod: 26 | Performed by: PATHOLOGY

## 2021-03-01 PROCEDURE — 999N000141 HC STATISTIC PRE-PROCEDURE NURSING ASSESSMENT: Performed by: SURGERY

## 2021-03-01 PROCEDURE — 0FT44ZZ RESECTION OF GALLBLADDER, PERCUTANEOUS ENDOSCOPIC APPROACH: ICD-10-PCS | Performed by: SURGERY

## 2021-03-01 PROCEDURE — 710N000010 HC RECOVERY PHASE 1, LEVEL 2, PER MIN: Performed by: SURGERY

## 2021-03-01 PROCEDURE — 85610 PROTHROMBIN TIME: CPT | Performed by: NURSE PRACTITIONER

## 2021-03-01 PROCEDURE — 36415 COLL VENOUS BLD VENIPUNCTURE: CPT | Performed by: NURSE PRACTITIONER

## 2021-03-01 PROCEDURE — 250N000026 HC DESFLURANE, PER MIN: Performed by: SURGERY

## 2021-03-01 RX ORDER — ALBUTEROL SULFATE 0.83 MG/ML
2.5 SOLUTION RESPIRATORY (INHALATION) EVERY 4 HOURS PRN
Status: DISCONTINUED | OUTPATIENT
Start: 2021-03-01 | End: 2021-03-01 | Stop reason: HOSPADM

## 2021-03-01 RX ORDER — HYDROMORPHONE HYDROCHLORIDE 1 MG/ML
.3-.5 INJECTION, SOLUTION INTRAMUSCULAR; INTRAVENOUS; SUBCUTANEOUS EVERY 10 MIN PRN
Status: DISCONTINUED | OUTPATIENT
Start: 2021-03-01 | End: 2021-03-01 | Stop reason: HOSPADM

## 2021-03-01 RX ORDER — PROPOFOL 10 MG/ML
INJECTION, EMULSION INTRAVENOUS PRN
Status: DISCONTINUED | OUTPATIENT
Start: 2021-03-01 | End: 2021-03-01

## 2021-03-01 RX ORDER — OXYCODONE HYDROCHLORIDE 5 MG/1
5 TABLET ORAL EVERY 4 HOURS PRN
Status: DISCONTINUED | OUTPATIENT
Start: 2021-03-01 | End: 2021-03-01 | Stop reason: HOSPADM

## 2021-03-01 RX ORDER — DEXAMETHASONE SODIUM PHOSPHATE 10 MG/ML
INJECTION, SOLUTION INTRAMUSCULAR; INTRAVENOUS PRN
Status: DISCONTINUED | OUTPATIENT
Start: 2021-03-01 | End: 2021-03-01

## 2021-03-01 RX ORDER — NALOXONE HYDROCHLORIDE 0.4 MG/ML
0.2 INJECTION, SOLUTION INTRAMUSCULAR; INTRAVENOUS; SUBCUTANEOUS
Status: DISCONTINUED | OUTPATIENT
Start: 2021-03-01 | End: 2021-03-01 | Stop reason: HOSPADM

## 2021-03-01 RX ORDER — MEPERIDINE HYDROCHLORIDE 25 MG/ML
12.5 INJECTION INTRAMUSCULAR; INTRAVENOUS; SUBCUTANEOUS
Status: DISCONTINUED | OUTPATIENT
Start: 2021-03-01 | End: 2021-03-01 | Stop reason: HOSPADM

## 2021-03-01 RX ORDER — OXYCODONE AND ACETAMINOPHEN 5; 325 MG/1; MG/1
1 TABLET ORAL
Status: COMPLETED | OUTPATIENT
Start: 2021-03-01 | End: 2021-03-01

## 2021-03-01 RX ORDER — LIDOCAINE HYDROCHLORIDE 20 MG/ML
INJECTION, SOLUTION INFILTRATION; PERINEURAL PRN
Status: DISCONTINUED | OUTPATIENT
Start: 2021-03-01 | End: 2021-03-01

## 2021-03-01 RX ORDER — FENTANYL CITRATE 50 UG/ML
INJECTION, SOLUTION INTRAMUSCULAR; INTRAVENOUS PRN
Status: DISCONTINUED | OUTPATIENT
Start: 2021-03-01 | End: 2021-03-01

## 2021-03-01 RX ORDER — ONDANSETRON 4 MG/1
4 TABLET, ORALLY DISINTEGRATING ORAL EVERY 30 MIN PRN
Status: DISCONTINUED | OUTPATIENT
Start: 2021-03-01 | End: 2021-03-01 | Stop reason: HOSPADM

## 2021-03-01 RX ORDER — NALOXONE HYDROCHLORIDE 0.4 MG/ML
0.4 INJECTION, SOLUTION INTRAMUSCULAR; INTRAVENOUS; SUBCUTANEOUS
Status: DISCONTINUED | OUTPATIENT
Start: 2021-03-01 | End: 2021-03-01 | Stop reason: HOSPADM

## 2021-03-01 RX ORDER — SODIUM CHLORIDE, SODIUM LACTATE, POTASSIUM CHLORIDE, CALCIUM CHLORIDE 600; 310; 30; 20 MG/100ML; MG/100ML; MG/100ML; MG/100ML
INJECTION, SOLUTION INTRAVENOUS CONTINUOUS
Status: DISCONTINUED | OUTPATIENT
Start: 2021-03-01 | End: 2021-03-01 | Stop reason: HOSPADM

## 2021-03-01 RX ORDER — ONDANSETRON 2 MG/ML
INJECTION INTRAMUSCULAR; INTRAVENOUS PRN
Status: DISCONTINUED | OUTPATIENT
Start: 2021-03-01 | End: 2021-03-01

## 2021-03-01 RX ORDER — ONDANSETRON 2 MG/ML
4 INJECTION INTRAMUSCULAR; INTRAVENOUS EVERY 30 MIN PRN
Status: DISCONTINUED | OUTPATIENT
Start: 2021-03-01 | End: 2021-03-01 | Stop reason: HOSPADM

## 2021-03-01 RX ORDER — LIDOCAINE 40 MG/G
CREAM TOPICAL
Status: DISCONTINUED | OUTPATIENT
Start: 2021-03-01 | End: 2021-03-01 | Stop reason: HOSPADM

## 2021-03-01 RX ORDER — PROPOFOL 10 MG/ML
INJECTION, EMULSION INTRAVENOUS CONTINUOUS PRN
Status: DISCONTINUED | OUTPATIENT
Start: 2021-03-01 | End: 2021-03-01

## 2021-03-01 RX ORDER — FENTANYL CITRATE 50 UG/ML
25-50 INJECTION, SOLUTION INTRAMUSCULAR; INTRAVENOUS
Status: DISCONTINUED | OUTPATIENT
Start: 2021-03-01 | End: 2021-03-01 | Stop reason: HOSPADM

## 2021-03-01 RX ORDER — BUPIVACAINE HYDROCHLORIDE AND EPINEPHRINE 2.5; 5 MG/ML; UG/ML
INJECTION, SOLUTION INFILTRATION; PERINEURAL PRN
Status: DISCONTINUED | OUTPATIENT
Start: 2021-03-01 | End: 2021-03-01 | Stop reason: HOSPADM

## 2021-03-01 RX ORDER — OXYCODONE AND ACETAMINOPHEN 5; 325 MG/1; MG/1
1 TABLET ORAL EVERY 6 HOURS PRN
Qty: 8 TABLET | Refills: 0 | Status: SHIPPED | OUTPATIENT
Start: 2021-03-01 | End: 2021-04-08

## 2021-03-01 RX ADMIN — ONDANSETRON 4 MG: 2 INJECTION INTRAMUSCULAR; INTRAVENOUS at 10:09

## 2021-03-01 RX ADMIN — FENTANYL CITRATE 50 MCG: 50 INJECTION, SOLUTION INTRAMUSCULAR; INTRAVENOUS at 09:48

## 2021-03-01 RX ADMIN — CEFAZOLIN SODIUM 2 G: 2 INJECTION, SOLUTION INTRAVENOUS at 10:13

## 2021-03-01 RX ADMIN — PROPOFOL 200 MG: 10 INJECTION, EMULSION INTRAVENOUS at 09:59

## 2021-03-01 RX ADMIN — DEXAMETHASONE SODIUM PHOSPHATE 10 MG: 10 INJECTION, SOLUTION INTRAMUSCULAR; INTRAVENOUS at 10:09

## 2021-03-01 RX ADMIN — ROCURONIUM BROMIDE 50 MG: 10 INJECTION INTRAVENOUS at 09:59

## 2021-03-01 RX ADMIN — ROCURONIUM BROMIDE 20 MG: 10 INJECTION INTRAVENOUS at 10:38

## 2021-03-01 RX ADMIN — SODIUM CHLORIDE, POTASSIUM CHLORIDE, SODIUM LACTATE AND CALCIUM CHLORIDE: 600; 310; 30; 20 INJECTION, SOLUTION INTRAVENOUS at 10:23

## 2021-03-01 RX ADMIN — ONDANSETRON 4 MG: 2 INJECTION INTRAMUSCULAR; INTRAVENOUS at 11:43

## 2021-03-01 RX ADMIN — FENTANYL CITRATE 25 MCG: 50 INJECTION INTRAMUSCULAR; INTRAVENOUS at 11:22

## 2021-03-01 RX ADMIN — SUGAMMADEX 200 MG: 100 INJECTION, SOLUTION INTRAVENOUS at 10:51

## 2021-03-01 RX ADMIN — CEFAZOLIN SODIUM 2 G: 2 INJECTION, SOLUTION INTRAVENOUS at 10:03

## 2021-03-01 RX ADMIN — KETOROLAC TROMETHAMINE 30 MG: 30 INJECTION, SOLUTION INTRAMUSCULAR at 10:47

## 2021-03-01 RX ADMIN — LIDOCAINE HYDROCHLORIDE 100 MG: 20 INJECTION, SOLUTION INFILTRATION; PERINEURAL at 09:59

## 2021-03-01 RX ADMIN — KETOROLAC TROMETHAMINE 30 MG: 30 INJECTION, SOLUTION INTRAMUSCULAR at 04:53

## 2021-03-01 RX ADMIN — PROPOFOL 100 MCG/KG/MIN: 10 INJECTION, EMULSION INTRAVENOUS at 09:59

## 2021-03-01 RX ADMIN — HYDROMORPHONE HYDROCHLORIDE 0.5 MG: 1 INJECTION, SOLUTION INTRAMUSCULAR; INTRAVENOUS; SUBCUTANEOUS at 10:22

## 2021-03-01 RX ADMIN — OXYCODONE HYDROCHLORIDE AND ACETAMINOPHEN 1 TABLET: 5; 325 TABLET ORAL at 12:45

## 2021-03-01 RX ADMIN — MIDAZOLAM 1 MG: 1 INJECTION INTRAMUSCULAR; INTRAVENOUS at 09:48

## 2021-03-01 RX ADMIN — SODIUM CHLORIDE, POTASSIUM CHLORIDE, SODIUM LACTATE AND CALCIUM CHLORIDE 150 ML/HR: 600; 310; 30; 20 INJECTION, SOLUTION INTRAVENOUS at 04:48

## 2021-03-01 RX ADMIN — HYDROMORPHONE HYDROCHLORIDE 0.3 MG: 1 INJECTION, SOLUTION INTRAMUSCULAR; INTRAVENOUS; SUBCUTANEOUS at 11:22

## 2021-03-01 RX ADMIN — FENTANYL CITRATE 50 MCG: 50 INJECTION, SOLUTION INTRAMUSCULAR; INTRAVENOUS at 10:20

## 2021-03-01 ASSESSMENT — MIFFLIN-ST. JEOR: SCORE: 1601.4

## 2021-03-01 NOTE — ANESTHESIA PROCEDURE NOTES
Airway   Date/Time: 3/1/2021 10:01 AM   Patient location during procedure: OR  Staff -   CRNA: Gee Eli APRN CRNA  Performed By: CRNA    Consent for Airway   Urgency: elective    Indications and Patient Condition  Indications for airway management: jerson-procedural  Induction type:intravenousMask difficulty assessment: 1 - vent by mask    Final Airway Details  Final airway type: endotracheal airway  Successful airway:ETT - single  Endotracheal Airway Details   ETT size (mm): 6.5  Cuffed: yes  Cuff volume (mL): 8  Successful intubation technique: direct laryngoscopy  Grade View of Cords: 1  Adjucts: stylet  Measured from: lips  Secured with: silk tape  Bite block used: Oral Airway    Post intubation assessment   Placement verified by: capnometry, equal breath sounds and chest rise   Number of attempts at approach: 1  Number of other approaches attempted: 0  Secured with:silk tape  Ease of procedure: easy  Dentition: Intact and Unchanged

## 2021-03-01 NOTE — DISCHARGE INSTRUCTIONS
**Ibuprofen given in your IV at 10:47am.  No Ibuprofen till 4:45pm.      [facility]    Home Care Following Gallbladder Surgery    Dr. Hearn      Care of the Incision:    If surgical glue was used on your incision, keep it dry for 24 hours.  Then you may shower but don t submerge under water for at least 2 weeks.  Gently pat your incision dry with a freshly laundered towel.    Do not touch your incision with bare hands or pick at scabs.    Leave your incision open to air.  Cover it only if draining, clothing rubs or irritates it.    Activity:    Gradually increase your activity.  Walk short distances several times each day and increase the distance as your strength allows.    To promote circulation, do not cross your legs while sitting.    Return to work will be determined by the type of work you do and should be discussed with your physician.    Do not drive or operate equipment while taking prescription pain medicines.  You may drive 1 week after surgery if you have stopped taking prescription pain medicines and can react quickly enough to make an emergency stop if necessary.    Diet:    Return to the diet you were on before surgery.    Drink plenty of water.    Avoid foods that cause constipation.      REMEMBER--most prescription pain pills cause constipation.  Walking, extra fluids, and increased fiber (fresh fruits and vegetables, etc.) are natural remedies for constipation.  You can also take mineral oil, 1-2 Tablespoons per day.  If still constipated you may try a stool softener such as Colace or Miralax.    Call Your Physician if You Have:    Redness, increased swelling or cloudy drainage from your incision.    A temperature of more than 101 degrees F.    Worsening pain in your incision not relieved by your prescription pain pills and/or a short rest.    Jaundice (yellowing of skin or eyes)    Abdominal distention (stomach getting very large)    Swelling in your legs    Productive  cough    Burning with urination    Any questions or concerns about your recovery, please call       Business hours [phone]  After hours [phone] Nurse Advice Line (24 hours a day)    Follow-up Care:    Make an appointment 2-3 week after your surgery if not already made.  Call [phone].    [facility]   Adult Discharge Orders & Instructions after anesthesia    For 24 hours after surgery    1. Get plenty of rest.  A responsible adult must stay with you for at least 24 hours after you leave the hospital.   2. Do not drive or use heavy equipment.  If you have weakness or tingling, don't drive or use heavy equipment until this feeling goes away.  3. Do not drink alcohol.  4. Avoid strenuous or risky activities.  Ask for help when climbing stairs.   5. You may feel lightheaded.  If so, sit for a few minutes before standing.  Have someone help you get up.   6. You may have a slight fever. Call the doctor if your fever is over 100 F (37.7 C) (taken under the tongue) or lasts longer than 24 hours.  7. You may have a dry mouth, a sore throat, muscle aches or trouble sleeping.  These should go away after 24 hours.  8. Do not make important or legal decisions.  We don t expect you to have any problems from the surgery or treatment you had today. Just in case, here s what to do if you have pain, upset stomach (nausea), bleeding or infection:  Pain:  Take medicines your doctor has prescribed or over-the-counter medicine they have suggested. Resting and using ice packs can help, too. For surgery on an arm or leg, raise it on a pillow to ease swelling. Call your doctor if these methods don t work.  Copyright Paulo Roberts, Licensed under [employer]  Upset stomach (nausea):  Take anti-nausea medicine approved by your doctor. Drink clear liquids like apple juice, ginger ale, broth or 7-Up. Be sure to drink enough fluids. Rest can help, too. Move to normal foods when you re ready.    Bleeding:  In the first 24 hours, you may see a little blood on your dressing, about the size of a quarter. You don t need to worry about this much blood, but if the blood spot keeps getting bigger:    Put pressure on the wound if you can, AND    Call your doctor.  Copyright [employer], Licensed under [employer]  Fever/Infection: Please call your doctor if you have any of these signs:    Redness    Swelling    Wound feels warm    Pain gets worse    Bad-smelling fluid leaks from wound    Fever or chills  Call your doctor for any of the followin.  It has been over 8 to 10 hours since surgery and you are still not able to urinate (pass water).    2.  Headache for over 24 hours.    Nurse advice line: [phone] 24 hours

## 2021-03-01 NOTE — ANESTHESIA POSTPROCEDURE EVALUATION
Patient: Jyoti New    Procedure(s):  CHOLECYSTECTOMY, LAPAROSCOPIC    Diagnosis:Gallstone pancreatitis [K85.10]  Diagnosis Additional Information: No value filed.    Anesthesia Type:  General    Note:  Disposition: Outpatient   Postop Pain Control: Uneventful            Sign Out: Well controlled pain   PONV: No   Neuro/Psych: Uneventful            Sign Out: Acceptable/Baseline neuro status   Airway/Respiratory: Uneventful            Sign Out: Acceptable/Baseline resp. status   CV/Hemodynamics: Uneventful            Sign Out: Acceptable CV status   Other NRE: NONE   DID A NON-ROUTINE EVENT OCCUR? No    Event details/Postop Comments:  Pt was happy with anesthesia care.  No complications.  I will follow up with the pt if needed.         Last vitals:  Vitals:    03/01/21 1230 03/01/21 1245 03/01/21 1300   BP:      Pulse:      Resp: 14 16    Temp:  97.7  F (36.5  C) 97.1  F (36.2  C)   SpO2:          Last vitals prior to Anesthesia Care Transfer:  CRNA VITALS  3/1/2021 1030 - 3/1/2021 1130      3/1/2021             Resp Rate (observed):  (!) 4          Electronically Signed By: JOYCE Turner CRNA  March 1, 2021  1:37 PM

## 2021-03-01 NOTE — ANESTHESIA CARE TRANSFER NOTE
Patient: Jyoti New    Procedure(s):  CHOLECYSTECTOMY, LAPAROSCOPIC    Diagnosis: Gallstone pancreatitis [K85.10]  Diagnosis Additional Information: No value filed.    Anesthesia Type:   General     Note:    Oropharynx: spontaneously breathing  Level of Consciousness: drowsy  Oxygen Supplementation: face mask  Level of Supplemental Oxygen (L/min / FiO2): 100%  Independent Airway: airway patency satisfactory and stable  Dentition: dentition unchanged  Vital Signs Stable: post-procedure vital signs reviewed and stable  Report to RN Given: handoff report given  Patient transferred to: PACU    Handoff Report: Identifed the Patient, Identified the Reponsible Provider, Reviewed the pertinent medical history, Discussed the surgical course, Reviewed Intra-OP anesthesia mangement and issues during anesthesia, Set expectations for post-procedure period and Allowed opportunity for questions and acknowledgement of understanding      Vitals: (Last set prior to Anesthesia Care Transfer)  CRNA VITALS  3/1/2021 1030 - 3/1/2021 1108      3/1/2021             Resp Rate (observed):  (!) 4        Electronically Signed By: JOYCE Turner CRNA  March 1, 2021  11:08 AM

## 2021-03-01 NOTE — BRIEF OP NOTE
[facility] Brief Operative Note    Pre-operative diagnosis: Gallstone pancreatitis [K85.10]   Post-operative diagnosis gallstone pancreatitis     Procedure: Procedure(s):  CHOLECYSTECTOMY, LAPAROSCOPIC   Surgeon(s): Surgeon(s) and Role:     * Scott Hearn,  - Primary   Estimated blood loss: 10ml    Specimens: ID Type Source Tests Collected by Time Destination   A : gallbladder and contents Tissue Gallbladder and Contents SURGICAL PATHOLOGY EXAM Scott Hearn DO 3/1/2021 10:43 AM       Findings: See dictation

## 2021-03-01 NOTE — PLAN OF CARE
S: Shift review   B:Jyoti is a  who delivered vaginally on 21, readmitted on  with Acute pancreatitis and cholecystitis, cholelithiasis.  Surgery is scheduled for today, time undetermined as of yet.  NPO since midnight.  A: VSS, patient is independent with mobility, pain well controlled with IV pain meds. Handles baby with confidence.  R: Continue with routine PP care

## 2021-03-01 NOTE — OP NOTE
Procedure Date: 03/01/2021      TIME:  11:05 a.m.      PROCEDURE PERFORMED:  Laparoscopic cholecystectomy.      POSTOPERATIVE DIAGNOSIS:  Gallstone pancreatitis.      POSTOPERATIVE DIAGNOSIS:  Gallstone pancreatitis.      SURGEON:  Scott Hearn DO      ASSISTANT:  None.      ANESTHESIA:  General endotracheal anesthesia.      COMPLICATIONS:  None immediately apparent.      ESTIMATED BLOOD LOSS:  10 mL      SPECIMENS:  Gallbladder and contents.      INDICATIONS FOR PROCEDURE:  Jyoti is a 20-year-old female who was admitted over the weekend with a diagnosis of gallstone pancreatitis.  She had recently delivered a baby girl earlier in February and had some postprandial abdominal discomfort since then.  She did go to the ER a few days ago with this complaint.  At that time, thought it was just indigestion, but then came back the next day with worsening pain.  She was found to have a significantly elevated lipase as well as some liver function tests and mild elevation of her bilirubin and ultrasound which showed gallstones and she was subsequently diagnosed with gallstone pancreatitis.  Over the last 2 days, she has improved clinically significantly.  She is having minimal pain.  She tolerated a clear liquid diet yesterday without any significant postprandial pain.  Her liver functions are improving.  Her bilirubin is normal.  I discussed with her yesterday and today laparoscopic cholecystectomy for her gallstone pancreatitis.  I described the procedure in detail as well as the risks, benefits, alternatives, and postop care and restrictions.  After our informed discussion, agreed to proceed with surgery.      DESCRIPTION OF PROCEDURE:  After the informed consent was obtained, the patient was brought from the preoperative holding area to the operating room, placed in supine position.  Anesthesia was induced.  She was prepped and draped in normal sterile fashion.  Timeout was performed.  After the correct patient  and correct procedure were verified, I began by making a 5 mm incision in the supraumbilical area.  Veress needle was inserted into the peritoneal cavity, and the abdomen was insufflated to 15 mmHg.  Trocar was inserted.  Camera was inserted.  General survey of the abdomen revealed no gross abnormalities.  The patient was placed in a head up rotated left position.  Two additional 5 mm trocars were placed in the right subcostal margin and an 11 mm trocar was placed in the subxiphoid area, all under direct visualization.  There were some omental adhesions that I took down from the gallbladder with a minimal amount of electrocautery.  The peritoneum around the Theresa's pouch was taken down and using a curved [place] dissector, I was able to safely dissect around the neck of the cystic duct.  Posterior to this, I encountered the cystic artery.  This branched posteriorly and anteriorly.  I was able to dissect away the tissue surrounding this vessel.  Posterior to this, dissection continued and I reached the liver.  I cleared off the cystic plate.  At this point, I had 2 structures clearly going into the gallbladder, namely the cystic duct and cystic artery.  These 2 structures were clipped on the stay side x2 and the specimen side x1 and transected with EndoShears.  Using hook electrocautery, I then removed the gallbladder from the liver bed without issue.  Hemostasis was verified.  The gallbladder was then placed in an EndoCatch bag and brought through the 11 mm incision.  Another survey of the operative field revealed no blood or bile staining.  The 11 mm fascial defect was closed with 0 Vicryl suture using a PMI closure device.  The patient's abdomen was then desufflated, while the ports were removed under direct visualization.  The skin was instilled with 0.25% Marcaine with epinephrine for local anesthesia.  Skin was closed with inverted interrupted 4-0 Monocryl sutures and Exofin dressing was applied.  At the  completion of the case, all instruments, needles and sponges were accounted for after a correct count.  The patient was then awoken from anesthesia and brought to the recovery room in stable condition.         CORNELIA ESQUIVEL DO             D: 2021   T: 2021   MT: EVETTE      Name:     RAVI ALEJANDRE   MRN:      [id number]        Account:        [id number]   :      2000           Procedure Date: 2021      Document: [id number]

## 2021-03-01 NOTE — ANESTHESIA PREPROCEDURE EVALUATION
Anesthesia Pre-Procedure Evaluation    Patient: Jyoti New   MRN: [id number] : 2000        Preoperative Diagnosis: Gallstone pancreatitis [K85.10]   Procedure : Procedure(s):  CHOLECYSTECTOMY, LAPAROSCOPIC, possible open     Past Medical History:   Diagnosis Date     Cholelithiasis 2021     Known health problems: none       Past Surgical History:   Procedure Laterality Date     NO HISTORY OF SURGERY        No Known Allergies   Social History     Tobacco Use     Smoking status: Current Every Day Smoker     Packs/day: 0.25     Smokeless tobacco: Never Used   Substance Use Topics     Alcohol use: Yes     Frequency: Never     Binge frequency: Never     Comment: occ      Wt Readings from Last 1 Encounters:   21 78.5 kg (173 lb)        Anesthesia Evaluation   Pt has not had prior anesthetic         ROS/MED HX  ENT/Pulmonary:  - neg pulmonary ROS     Neurologic:  - neg neurologic ROS     Cardiovascular:     (+) hypertension-range: PIH/ ----Previous cardiac testing   Echo: Date: Results:    Stress Test: Date: Results:    ECG Reviewed: Date: 21 Results:  Marked sinus Bradycardia   BORDERLINE RHYTHM    Cath: Date: Results:      METS/Exercise Tolerance: >4 METS    Hematologic:       Musculoskeletal:  - neg musculoskeletal ROS     GI/Hepatic: Comment: Gall stone pancreatitis  Post partum 20 - breast feeding     (+) cholecystitis/cholelithiasis,     Renal/Genitourinary:  - neg Renal ROS     Endo:  - neg endo ROS     Psychiatric/Substance Use:       Infectious Disease: Comment: COVID-19 (+) 21      Malignancy:  - neg malignancy ROS     Other: Comment:  21    (-) Any chance pregnant       Physical Exam    Airway        Mallampati: II   TM distance: > 3 FB   Neck ROM: full   Mouth opening: > 3 cm    Respiratory Devices and Support         Dental  no notable dental history         Cardiovascular   cardiovascular exam normal       Rhythm and rate: regular and normal     Pulmonary            breath sounds clear to auscultation           OUTSIDE LABS:  CBC:   Lab Results   Component Value Date    WBC 5.0 03/01/2021    WBC 6.0 02/28/2021    HGB 10.1 (L) 03/01/2021    HGB 9.8 (L) 02/28/2021    HCT 31.9 (L) 03/01/2021    HCT 31.1 (L) 02/28/2021     03/01/2021     02/28/2021     BMP:   Lab Results   Component Value Date     03/01/2021     02/28/2021    POTASSIUM 3.8 03/01/2021    POTASSIUM 3.6 02/28/2021    CHLORIDE 112 (H) 03/01/2021    CHLORIDE 112 (H) 02/28/2021    CO2 25 03/01/2021    CO2 26 02/28/2021    BUN 13 03/01/2021    BUN 14 02/28/2021    CR 0.65 03/01/2021    CR 0.66 02/28/2021    GLC 87 03/01/2021    GLC 91 02/28/2021     COAGS:   Lab Results   Component Value Date    PTT 28 02/27/2021    INR 1.06 03/01/2021    FIBR 454 (H) 02/08/2021     POC:   Lab Results   Component Value Date    HCG Negative 10/20/2018     HEPATIC:   Lab Results   Component Value Date    ALBUMIN 2.7 (L) 03/01/2021    PROTTOTAL 5.9 (L) 03/01/2021     (H) 03/01/2021    AST 57 (H) 03/01/2021    ALKPHOS 195 (H) 03/01/2021    BILITOTAL 0.4 03/01/2021     OTHER:   Lab Results   Component Value Date    LACT 0.6 (L) 02/27/2021    RENE 8.3 (L) 03/01/2021    LIPASE 554 (H) 03/01/2021    CRP <2.9 02/27/2021       Anesthesia Plan    ASA Status:  2   NPO Status:  NPO Appropriate    Anesthesia Type: General.     - Airway: ETT   Induction: Intravenous, Propofol.   Maintenance: Inhalation.        Consents    Anesthesia Plan(s) and associated risks, benefits, and realistic alternatives discussed. Questions answered and patient/representative(s) expressed understanding.     - Discussed with:  Patient      - Extended Intubation/Ventilatory Support Discussed: no Extended Intubation.      - Patient is DNR/DNI Status: No    Use of blood products discussed: No .     Postoperative Care    Pain management: IV analgesics, Multi-modal analgesia, Oral pain medications.   PONV prophylaxis: Ondansetron (or other  5HT-3), Dexamethasone or Solumedrol, Background Propofol Infusion     Comments:                JOYCE Turner CRNA

## 2021-03-02 DIAGNOSIS — R11.0 NAUSEA: Primary | ICD-10-CM

## 2021-03-02 LAB
BACTERIA SPEC CULT: NORMAL
Lab: NORMAL
SPECIMEN SOURCE: NORMAL

## 2021-03-02 NOTE — TELEPHONE ENCOUNTER
Reason for Call:  Other lovenox    Detailed comments: Jyoti calls to report that she had surgery yesterday and she does not know when to restart the lovenox?    Phone Number Patient can be reached at: Home number on file [phone] (home)    Best Time:     Can we leave a detailed message on this number? YES    Call taken on 3/2/2021 at 8:19 AM by Monserrat Vaughn

## 2021-03-03 RX ORDER — ONDANSETRON 4 MG/1
4 TABLET, ORALLY DISINTEGRATING ORAL EVERY 8 HOURS PRN
Qty: 30 TABLET | Refills: 1 | Status: SHIPPED | OUTPATIENT
Start: 2021-03-03 | End: 2021-04-08

## 2021-03-03 NOTE — TELEPHONE ENCOUNTER
I called pt and informed her that that Per Dr. Burton she can go back on her med she asked is she can get something for her nausea.,  A.h

## 2021-03-03 NOTE — DISCHARGE SUMMARY
[facility] Discharge Summary    Jyoti New MRN# [id number]   Age: 20 year old YOB: 2000     Date of Admission:  2/27/2021  Date of Discharge::  3/1/2021  1:35 PM  Admitting Physician:  Julienne Gtz MD  Discharge Physician:  Scott Hearn, DO     Home clinic: [facility]          Admission Diagnoses:   Gallstone pancreatitis [K85.10]            Discharge Diagnosis:   Gallstone pancreatitis [K85.10]          Procedures:   Procedure(s): Cholecystectomy, laproscopic       No other procedures performed during this admission           Medications Prior to Admission:     No medications prior to admission.             Discharge Medications:     Discharge Medication List as of 3/1/2021 12:25 PM      START taking these medications    Details   oxyCODONE-acetaminophen (PERCOCET) 5-325 MG tablet Take 1 tablet by mouth every 6 hours as needed for pain, Disp-8 tablet, R-0, E-Prescribe         CONTINUE these medications which have NOT CHANGED    Details   acetaminophen (TYLENOL) 325 MG tablet Take 2 tablets (650 mg) by mouth every 4 hours as needed for mild pain or fever (greater than or equal to 38  C /100.4  F (oral) or 38.5  C/ 101.4  F (core).), Disp-30 tablet, R-1, Local Print      enoxaparin ANTICOAGULANT (LOVENOX) 40 MG/0.4ML syringe Inject 0.4 mLs (40 mg) Subcutaneous every 24 hours, Disp-18 mL, R-0, E-Prescribe      ferrous sulfate (FEROSUL) 325 (65 Fe) MG tablet Take 1 tablet (325 mg) by mouth daily, Disp-30 tablet, R-1, E-Prescribe      ibuprofen (ADVIL/MOTRIN) 600 MG tablet Take 1 tablet (600 mg) by mouth every 6 hours as needed for moderate pain, Disp-30 tablet, R-0, E-Prescribe      lanolin ointment Use as needed for dry, cracked or sore nipples. No need to wash off.Disp-14 g, [id number]      Prenatal Vit-Fe Fumarate-FA (PRENATAL MULTIVITAMIN W/IRON) 27-0.8 MG tablet Take 1 tablet by mouth daily, Disp-90 tablet, R-1, E-Prescribe      senna-docusate  (SENOKOT-S/PERICOLACE) 8.6-50 MG tablet Take 1 tablet by mouth 2 times daily as needed for constipation, Disp-60 tablet, R-0, E-Prescribe                   Consultations:   Admitted to Internal Medicine with general surgery consulting for cholecystectomy.          Brief History of Illness:   Reason for admission requiring a surgical or invasive procedure:   Gallstone pancreatitis [K85.10]   The patient underwent the following procedure(s):   Laparoscopic cholecystectomy   There were no immediate complications during this procedure.  Please refer to the full operative summary for details.             Hospital Course:   She clinically improved and enzymes and labs were all improving so laparoscopic cholecystectomy was offered on hospital day #3. She was discharged to home following the procedure without complications.           Discharge Instructions and Follow-Up:   Discharge diet: Regular   Discharge activity: Activity as tolerated   Discharge follow-up: Follow up with me in 2 weeks   Wound care: Ice to area for comfort           Discharge Disposition:   Discharged to home      Scott Hearn DO

## 2021-03-04 LAB — COPATH REPORT: NORMAL

## 2021-03-05 LAB
BACTERIA SPEC CULT: NO GROWTH
BACTERIA SPEC CULT: NO GROWTH
SPECIMEN SOURCE: NORMAL
SPECIMEN SOURCE: NORMAL

## 2021-03-05 NOTE — PROGRESS NOTES
Jyoti New  Gender: female  : 2000  [address] [facility] [id number] [phone] (home)   Medical Record: [id number]  Primary Care Provider: No Ref-Primary, Physician       [facility]   ?   Discharge Phone Call: Key Words/Key Times     How are you and the baby?     How are feedings going?     Voiding & Stooling?     Any questions or concerns?     Follow-up appointment?       We want to provide excellent care here at [facility]. Do you have any feedback for us that would help us improve?     Call back COMMENTS:         Attempted Calls:   ___3/2021 1123 discharge callback attempted, no answer, L/M per JULIA Padilla RN______     __________

## 2021-03-07 ENCOUNTER — HEALTH MAINTENANCE LETTER (OUTPATIENT)
Age: 21
End: 2021-03-07

## 2021-03-10 DIAGNOSIS — Z30.9 ENCOUNTER FOR CONTRACEPTIVE MANAGEMENT, UNSPECIFIED TYPE: Primary | ICD-10-CM

## 2021-03-10 RX ORDER — DESOGESTREL AND ETHINYL ESTRADIOL 0.15-0.03
1 KIT ORAL DAILY
Qty: 84 TABLET | Refills: 3 | Status: SHIPPED | OUTPATIENT
Start: 2021-03-10 | End: 2022-05-17

## 2021-03-17 ENCOUNTER — OFFICE VISIT (OUTPATIENT)
Dept: SURGERY | Facility: CLINIC | Age: 21
End: 2021-03-17
Payer: COMMERCIAL

## 2021-03-17 VITALS
SYSTOLIC BLOOD PRESSURE: 122 MMHG | WEIGHT: 175.2 LBS | DIASTOLIC BLOOD PRESSURE: 60 MMHG | TEMPERATURE: 96.5 F | BODY MASS INDEX: 26.48 KG/M2

## 2021-03-17 DIAGNOSIS — Z90.49 S/P LAPAROSCOPIC CHOLECYSTECTOMY: Primary | ICD-10-CM

## 2021-03-17 PROCEDURE — 99024 POSTOP FOLLOW-UP VISIT: CPT | Performed by: SURGERY

## 2021-03-17 NOTE — LETTER
3/17/2021         RE: Jyoti New  [address] [facility] [address]        Dear Colleague,    Thank you for referring your patient, Jyoti New, to the [facility]. Please see a copy of my visit note below.    General Surgery Follow Up    Pt returns for follow up visit s/p lap tamie    HPI:  Doing well. Tolerating diet. No issues. Pain controlled. Incisions healing well.       Past Medical History:   Diagnosis Date     Cholelithiasis 2/27/2021     Known health problems: none        Past Surgical History:   Procedure Laterality Date     LAPAROSCOPIC CHOLECYSTECTOMY N/A 3/1/2021    Procedure: CHOLECYSTECTOMY, LAPAROSCOPIC;  Surgeon: Scott Hearn DO;  Location: [facility] OR     NO HISTORY OF SURGERY         Social History     Socioeconomic History     Marital status: Single     Spouse name: Not on file     Number of children: Not on file     Years of education: Not on file     Highest education level: Not on file   Occupational History     Not on file   Social Needs     Financial resource strain: Not on file     Food insecurity     Worry: Not on file     Inability: Not on file     Transportation needs     Medical: Not on file     Non-medical: Not on file   Tobacco Use     Smoking status: Current Every Day Smoker     Packs/day: 0.25     Smokeless tobacco: Never Used   Substance and Sexual Activity     Alcohol use: Yes     Frequency: Never     Binge frequency: Never     Comment: occ     Drug use: Never     Sexual activity: Not on file   Lifestyle     Physical activity     Days per week: Not on file     Minutes per session: Not on file     Stress: Not on file   Relationships     Social connections     Talks on phone: Not on file     Gets together: Not on file     Attends Hinduism service: Not on file     Active member of club or organization: Not on file     Attends meetings of clubs or organizations: Not on file     Relationship status: Not on file     Intimate partner  violence     Fear of current or ex partner: Not on file     Emotionally abused: Not on file     Physically abused: Not on file     Forced sexual activity: Not on file   Other Topics Concern     Not on file   Social History Narrative    11/2020  Lives in [place] with her parents and her brother.  Jyoti smokes 1 PPD.  She is in a relationship with Thomas.  No indoor cats/kittens.  No concerns about domestic violence.  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       Current Outpatient Medications   Medication Sig Dispense Refill     acetaminophen (TYLENOL) 325 MG tablet Take 2 tablets (650 mg) by mouth every 4 hours as needed for mild pain or fever (greater than or equal to 38  C /100.4  F (oral) or 38.5  C/ 101.4  F (core).) 30 tablet 1     desogestrel-ethinyl estradiol (APRI) 0.15-30 MG-MCG tablet Take 1 tablet by mouth daily 84 tablet 3     enoxaparin ANTICOAGULANT (LOVENOX) 40 MG/0.4ML syringe Inject 0.4 mLs (40 mg) Subcutaneous every 24 hours 18 mL 0     ferrous sulfate (FEROSUL) 325 (65 Fe) MG tablet Take 1 tablet (325 mg) by mouth daily 30 tablet 1     ibuprofen (ADVIL/MOTRIN) 600 MG tablet Take 1 tablet (600 mg) by mouth every 6 hours as needed for moderate pain 30 tablet 0     lanolin ointment Use as needed for dry, cracked or sore nipples. No need to wash off. 14 g 0     ondansetron (ZOFRAN-ODT) 4 MG ODT tab Take 1 tablet (4 mg) by mouth every 8 hours as needed for nausea (Patient not taking: Reported on 3/17/2021) 30 tablet 1     oxyCODONE-acetaminophen (PERCOCET) 5-325 MG tablet Take 1 tablet by mouth every 6 hours as needed for pain (Patient not taking: Reported on 3/17/2021) 8 tablet 0     Prenatal Vit-Fe Fumarate-FA (PRENATAL MULTIVITAMIN W/IRON) 27-0.8 MG tablet Take 1 tablet by mouth daily 90 tablet 1     senna-docusate (SENOKOT-S/PERICOLACE) 8.6-50 MG tablet Take 1 tablet by mouth 2 times daily as needed for constipation 60 tablet 0       Medications and history reviewed    Physical  exam:  Vitals: /60   Temp 96.5  F (35.8  C) (Temporal)   Wt 79.5 kg (175 lb 3.2 oz)   BMI 26.48 kg/m    BMI= Body mass index is 26.48 kg/m .    HEART: RRR, no new murmurs  LUNGS: CTAB, equal chest rise, good effort  ABD: soft, non tender, non distended  INCISIONS: c/d/i  EXT: ALANIZ, no deformities    PATHOLOGY:  cholelithiais    Assessment:     ICD-10-CM    1. S/P laparoscopic cholecystectomy  Z90.49      Plan: Doing great. Path reviewed and questions answered. She may return prn.    Scott Hearn DO        Again, thank you for allowing me to participate in the care of your patient.        Sincerely,        Scott Hearn DO

## 2021-03-17 NOTE — PROGRESS NOTES
General Surgery Follow Up    Pt returns for follow up visit s/p lap tamie    HPI:  Doing well. Tolerating diet. No issues. Pain controlled. Incisions healing well.       Past Medical History:   Diagnosis Date     Cholelithiasis 2/27/2021     Known health problems: none        Past Surgical History:   Procedure Laterality Date     LAPAROSCOPIC CHOLECYSTECTOMY N/A 3/1/2021    Procedure: CHOLECYSTECTOMY, LAPAROSCOPIC;  Surgeon: Scott Hearn DO;  Location: [facility] OR     NO HISTORY OF SURGERY         Social History     Socioeconomic History     Marital status: Single     Spouse name: Not on file     Number of children: Not on file     Years of education: Not on file     Highest education level: Not on file   Occupational History     Not on file   Social Needs     Financial resource strain: Not on file     Food insecurity     Worry: Not on file     Inability: Not on file     Transportation needs     Medical: Not on file     Non-medical: Not on file   Tobacco Use     Smoking status: Current Every Day Smoker     Packs/day: 0.25     Smokeless tobacco: Never Used   Substance and Sexual Activity     Alcohol use: Yes     Frequency: Never     Binge frequency: Never     Comment: occ     Drug use: Never     Sexual activity: Not on file   Lifestyle     Physical activity     Days per week: Not on file     Minutes per session: Not on file     Stress: Not on file   Relationships     Social connections     Talks on phone: Not on file     Gets together: Not on file     Attends Gnosticism service: Not on file     Active member of club or organization: Not on file     Attends meetings of clubs or organizations: Not on file     Relationship status: Not on file     Intimate partner violence     Fear of current or ex partner: Not on file     Emotionally abused: Not on file     Physically abused: Not on file     Forced sexual activity: Not on file   Other Topics Concern     Not on file   Social History Narrative    11/2020  Lives in  [place] with her parents and her brother.  Jyoti smokes 1 PPD.  She is in a relationship with Thomas.  No indoor cats/kittens.  No concerns about domestic violence.  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       Current Outpatient Medications   Medication Sig Dispense Refill     acetaminophen (TYLENOL) 325 MG tablet Take 2 tablets (650 mg) by mouth every 4 hours as needed for mild pain or fever (greater than or equal to 38  C /100.4  F (oral) or 38.5  C/ 101.4  F (core).) 30 tablet 1     desogestrel-ethinyl estradiol (APRI) 0.15-30 MG-MCG tablet Take 1 tablet by mouth daily 84 tablet 3     enoxaparin ANTICOAGULANT (LOVENOX) 40 MG/0.4ML syringe Inject 0.4 mLs (40 mg) Subcutaneous every 24 hours 18 mL 0     ferrous sulfate (FEROSUL) 325 (65 Fe) MG tablet Take 1 tablet (325 mg) by mouth daily 30 tablet 1     ibuprofen (ADVIL/MOTRIN) 600 MG tablet Take 1 tablet (600 mg) by mouth every 6 hours as needed for moderate pain 30 tablet 0     lanolin ointment Use as needed for dry, cracked or sore nipples. No need to wash off. 14 g 0     ondansetron (ZOFRAN-ODT) 4 MG ODT tab Take 1 tablet (4 mg) by mouth every 8 hours as needed for nausea (Patient not taking: Reported on 3/17/2021) 30 tablet 1     oxyCODONE-acetaminophen (PERCOCET) 5-325 MG tablet Take 1 tablet by mouth every 6 hours as needed for pain (Patient not taking: Reported on 3/17/2021) 8 tablet 0     Prenatal Vit-Fe Fumarate-FA (PRENATAL MULTIVITAMIN W/IRON) 27-0.8 MG tablet Take 1 tablet by mouth daily 90 tablet 1     senna-docusate (SENOKOT-S/PERICOLACE) 8.6-50 MG tablet Take 1 tablet by mouth 2 times daily as needed for constipation 60 tablet 0       Medications and history reviewed    Physical exam:  Vitals: /60   Temp 96.5  F (35.8  C) (Temporal)   Wt 79.5 kg (175 lb 3.2 oz)   BMI 26.48 kg/m    BMI= Body mass index is 26.48 kg/m .    HEART: RRR, no new murmurs  LUNGS: CTAB, equal chest rise, good effort  ABD: soft, non tender, non  distended  INCISIONS: c/d/i  EXT: ALANIZ, no deformities    PATHOLOGY:  cholelithiais    Assessment:     ICD-10-CM    1. S/P laparoscopic cholecystectomy  Z90.49      Plan: Doing great. Path reviewed and questions answered. She may return prn.    Scott Hearn, DO

## 2021-04-08 ENCOUNTER — PRENATAL OFFICE VISIT (OUTPATIENT)
Dept: FAMILY MEDICINE | Facility: CLINIC | Age: 21
End: 2021-04-08
Payer: COMMERCIAL

## 2021-04-08 VITALS
WEIGHT: 163 LBS | DIASTOLIC BLOOD PRESSURE: 82 MMHG | OXYGEN SATURATION: 99 % | SYSTOLIC BLOOD PRESSURE: 118 MMHG | HEART RATE: 118 BPM | TEMPERATURE: 96.5 F | RESPIRATION RATE: 18 BRPM | BODY MASS INDEX: 24.64 KG/M2

## 2021-04-08 PROCEDURE — 99207 PR POST PARTUM EXAM: CPT | Performed by: FAMILY MEDICINE

## 2021-04-08 ASSESSMENT — PAIN SCALES - GENERAL: PAINLEVEL: NO PAIN (0)

## 2021-04-08 NOTE — PROGRESS NOTES
"    Jyoti New  is here for a 6-week postpartum checkup.    She had a  , weight 8 pounds 2 oz., with  complications. Date of delivery was 10/24/15. Since delivery, she has been breast feeding.  She has no signs of infection, bleeding or other complications.  She is not pregnant.  We discussed contraceptions options at length.     History of Gestational Diabetes or hypertension?  No  Feeding Method:  Breast      REVIEW OF SYSTEMS:  12 system otherwise neg    Past medical history, problem list reviewed and updated as necessary    EXAM:  BP 88/54 mmHg  Pulse 101  Temp(Src) 96.8  F (36  C) (Temporal)  Ht 4' 11.7\" (1.516 m)  Wt 99 lb 11.2 oz (45.224 kg)  BMI 19.68 kg/m2  SpO2 95%  LMP 12/10/2015 (Approximate)  Breastfeeding? Yes  HEENT: grossly normal.  NECK: no lymphadenopathy or thyroidomegaly.  LUNGS: CTA X 2, no rales or crackles.  BACK: No spinal or CVA tenderness.  HEART: RRR without murmurs clicks or gallops.  ABDOMEN: soft, non tender, good bowel sounds, without masses rebound, guarding or tenderness.  PELVIC: Declined  EXTREMITIES:  warm to touch, good pulses, no ankle edema or calf tenderness.      ASSESSMENT:   6-week postpartum exam after .    PLAN:    .  One-hour glucose tolerance test needed? No  mini pill / progesterone only pill for contraception.        "

## 2021-04-14 NOTE — TELEPHONE ENCOUNTER
Patient calling on status of forms. Informed that the forms had been completed and placed up front at registration to be picked up. Gaby Miller LPN     no yes

## 2021-06-11 RX ORDER — NORELGESTROMIN AND ETHINYL ESTRADIOL 35; 150 UG/MG; UG/MG
PATCH TRANSDERMAL
Qty: 3 PATCH | Refills: 3 | Status: SHIPPED | OUTPATIENT
Start: 2021-06-11 | End: 2022-05-17

## 2021-10-10 ENCOUNTER — HEALTH MAINTENANCE LETTER (OUTPATIENT)
Age: 21
End: 2021-10-10

## 2021-12-31 ENCOUNTER — OFFICE VISIT (OUTPATIENT)
Dept: FAMILY MEDICINE | Facility: CLINIC | Age: 21
End: 2021-12-31
Payer: COMMERCIAL

## 2021-12-31 VITALS
BODY MASS INDEX: 22.67 KG/M2 | HEART RATE: 105 BPM | SYSTOLIC BLOOD PRESSURE: 110 MMHG | TEMPERATURE: 97.4 F | DIASTOLIC BLOOD PRESSURE: 66 MMHG | RESPIRATION RATE: 10 BRPM | OXYGEN SATURATION: 99 % | WEIGHT: 150 LBS

## 2021-12-31 DIAGNOSIS — Z00.00 ROUTINE GENERAL MEDICAL EXAMINATION AT A HEALTH CARE FACILITY: Primary | ICD-10-CM

## 2021-12-31 PROCEDURE — 99395 PREV VISIT EST AGE 18-39: CPT | Performed by: FAMILY MEDICINE

## 2021-12-31 ASSESSMENT — ENCOUNTER SYMPTOMS
FEVER: 0
HEARTBURN: 0
CHILLS: 1
HEMATURIA: 0
SHORTNESS OF BREATH: 0
EYE PAIN: 0
ABDOMINAL PAIN: 0
NAUSEA: 0
ARTHRALGIAS: 0
PALPITATIONS: 0
HEMATOCHEZIA: 0
PARESTHESIAS: 0
WEAKNESS: 0
BREAST MASS: 0
DIZZINESS: 1
CONSTIPATION: 0
SORE THROAT: 0
DYSURIA: 0
COUGH: 0
JOINT SWELLING: 0
DIARRHEA: 0
HEADACHES: 1
MYALGIAS: 0
NERVOUS/ANXIOUS: 1
FREQUENCY: 0

## 2021-12-31 NOTE — PROGRESS NOTES
SUBJECTIVE:   CC: Jyoti New is an 21 year old woman who presents for preventive health visit.       Patient has been advised of split billing requirements and indicates understanding: Yes  Healthy Habits:     Getting at least 3 servings of Calcium per day:  NO    Bi-annual eye exam:  NO    Dental care twice a year:  NO    Sleep apnea or symptoms of sleep apnea:  None    Diet:  Regular (no restrictions)    Frequency of exercise:  2-3 days/week    Duration of exercise:  Greater than 60 minutes    Taking medications regularly:  Not Applicable    Barriers to taking medications:  None    Medication side effects:  Not applicable    PHQ-2 Total Score: 1    Additional concerns today:  Yes (Hormones )              Today's PHQ-2 Score:   PHQ-2 ( 2000 [employer]) 12/31/2021   Q1: Little interest or pleasure in doing things 1   Q2: Feeling down, depressed or hopeless 0   PHQ-2 Score 1   PHQ-2 Total Score (12-17 Years)- Positive if 3 or more points; Administer PHQ-A if positive -   Q1: Little interest or pleasure in doing things Several days   Q2: Feeling down, depressed or hopeless Not at all   PHQ-2 Score 1       Abuse: Current or Past (Physical, Sexual or Emotional) - No  Do you feel safe in your environment? Yes    Have you ever done Advance Care Planning? (For example, a Health Directive, POLST, or a discussion with a medical provider or your loved ones about your wishes): No, advance care planning information given to patient to review.  Patient declined advance care planning discussion at this time.    Social History     Tobacco Use     Smoking status: Current Every Day Smoker     Packs/day: 0.25     Smokeless tobacco: Never Used   Substance Use Topics     Alcohol use: Yes     Comment: occ     If you drink alcohol do you typically have >3 drinks per day or >7 drinks per week? No    Alcohol Use 12/31/2021   Prescreen: >3 drinks/day or >7 drinks/week? Not Applicable       Reviewed orders with patient.  Reviewed health  maintenance and updated orders accordingly -       Breast Cancer Screening:        History of abnormal Pap smear:   PAP / HPV 1/7/2021   PAP (Historical) NIL     Reviewed and updated as needed this visit by clinical staff                Reviewed and updated as needed this visit by Provider                   Review of Systems   Constitutional: Positive for chills. Negative for fever.   HENT: Positive for ear pain. Negative for congestion, hearing loss and sore throat.    Eyes: Negative for pain and visual disturbance.   Respiratory: Negative for cough and shortness of breath.    Cardiovascular: Positive for chest pain. Negative for palpitations and peripheral edema.   Gastrointestinal: Negative for abdominal pain, constipation, diarrhea, heartburn, hematochezia and nausea.   Breasts:  Negative for tenderness, breast mass and discharge.   Genitourinary: Negative for dysuria, frequency, genital sores, hematuria, pelvic pain, urgency, vaginal bleeding and vaginal discharge.   Musculoskeletal: Negative for arthralgias, joint swelling and myalgias.   Skin: Negative for rash.   Neurological: Positive for dizziness and headaches. Negative for weakness and paresthesias.   Psychiatric/Behavioral: Positive for mood changes. The patient is nervous/anxious.           OBJECTIVE:   There were no vitals taken for this visit.  Physical Exam  GENERAL: healthy, alert and no distress  NECK: no adenopathy, no asymmetry, masses, or scars and thyroid normal to palpation  RESP: lungs clear to auscultation - no rales, rhonchi or wheezes  CV: regular rate and rhythm, normal S1 S2, no S3 or S4, no murmur, click or rub, no peripheral edema and peripheral pulses strong  ABDOMEN: soft, nontender, no hepatosplenomegaly, no masses and bowel sounds normal  MS: no gross musculoskeletal defects noted, no edema        ASSESSMENT/PLAN:       ICD-10-CM    1. Routine general medical examination at a health care facility  Z00.00        Patient has been  "advised of split billing requirements and indicates understanding: Yes  COUNSELING:      Estimated body mass index is 24.64 kg/m  as calculated from the following:    Height as of 3/1/21: 1.732 m (5' 8.2\").    Weight as of 4/8/21: 73.9 kg (163 lb).        She reports that she has been smoking. She has been smoking about 0.25 packs per day. She has never used smokeless tobacco.  Tobacco Cessation Action Plan:         Counseling Resources:  ATP IV Guidelines  Pooled Cohorts Equation Calculator  Breast Cancer Risk Calculator  BRCA-Related Cancer Risk Assessment: FHS-7 Tool  FRAX Risk Assessment  ICSI Preventive Guidelines  Dietary Guidelines for Americans, 2010  USDA's MyPlate  ASA Prophylaxis  Lung CA Screening    Jose Burton MD  [facility]  "

## 2022-02-18 ENCOUNTER — LAB (OUTPATIENT)
Dept: LAB | Facility: CLINIC | Age: 22
End: 2022-02-18
Payer: COMMERCIAL

## 2022-02-18 DIAGNOSIS — Z32.00 PREGNANCY EXAMINATION OR TEST, PREGNANCY UNCONFIRMED: ICD-10-CM

## 2022-02-18 LAB — HCG UR QL: NEGATIVE

## 2022-02-18 PROCEDURE — 81025 URINE PREGNANCY TEST: CPT

## 2022-05-17 ENCOUNTER — TELEPHONE (OUTPATIENT)
Dept: FAMILY MEDICINE | Facility: CLINIC | Age: 22
End: 2022-05-17
Payer: COMMERCIAL

## 2022-05-17 ENCOUNTER — HOSPITAL ENCOUNTER (EMERGENCY)
Facility: CLINIC | Age: 22
Discharge: HOME | End: 2022-05-17
Attending: EMERGENCY MEDICINE | Admitting: EMERGENCY MEDICINE
Payer: COMMERCIAL

## 2022-05-17 VITALS
DIASTOLIC BLOOD PRESSURE: 77 MMHG | RESPIRATION RATE: 18 BRPM | TEMPERATURE: 98.4 F | BODY MASS INDEX: 27.03 KG/M2 | OXYGEN SATURATION: 97 % | WEIGHT: 178.8 LBS | SYSTOLIC BLOOD PRESSURE: 124 MMHG | HEART RATE: 81 BPM

## 2022-05-17 DIAGNOSIS — E86.0 DEHYDRATION: ICD-10-CM

## 2022-05-17 DIAGNOSIS — R11.2 NAUSEA AND VOMITING, INTRACTABILITY OF VOMITING NOT SPECIFIED, UNSPECIFIED VOMITING TYPE: ICD-10-CM

## 2022-05-17 PROCEDURE — 99284 EMERGENCY DEPT VISIT MOD MDM: CPT | Mod: 25 | Performed by: EMERGENCY MEDICINE

## 2022-05-17 PROCEDURE — 250N000011 HC RX IP 250 OP 636: Performed by: EMERGENCY MEDICINE

## 2022-05-17 PROCEDURE — 96361 HYDRATE IV INFUSION ADD-ON: CPT | Performed by: EMERGENCY MEDICINE

## 2022-05-17 PROCEDURE — 96374 THER/PROPH/DIAG INJ IV PUSH: CPT | Performed by: EMERGENCY MEDICINE

## 2022-05-17 PROCEDURE — 99284 EMERGENCY DEPT VISIT MOD MDM: CPT | Performed by: EMERGENCY MEDICINE

## 2022-05-17 PROCEDURE — 258N000003 HC RX IP 258 OP 636: Performed by: EMERGENCY MEDICINE

## 2022-05-17 RX ORDER — NAPROXEN SODIUM 220 MG
220 TABLET ORAL 2 TIMES DAILY WITH MEALS
COMMUNITY
End: 2022-07-05

## 2022-05-17 RX ORDER — ONDANSETRON 4 MG/1
4 TABLET, ORALLY DISINTEGRATING ORAL EVERY 6 HOURS PRN
Qty: 10 TABLET | Refills: 0 | Status: SHIPPED | OUTPATIENT
Start: 2022-05-17 | End: 2022-05-24

## 2022-05-17 RX ORDER — ONDANSETRON 2 MG/ML
4 INJECTION INTRAMUSCULAR; INTRAVENOUS EVERY 30 MIN PRN
Status: DISCONTINUED | OUTPATIENT
Start: 2022-05-17 | End: 2022-05-17 | Stop reason: HOSPADM

## 2022-05-17 RX ADMIN — ONDANSETRON 4 MG: 2 INJECTION INTRAMUSCULAR; INTRAVENOUS at 10:27

## 2022-05-17 RX ADMIN — SODIUM CHLORIDE 1000 ML: 9 INJECTION, SOLUTION INTRAVENOUS at 10:27

## 2022-05-17 NOTE — ED TRIAGE NOTES
Two nights ago she started having nausea and vomiting.  She feels dehydrated today with dizziness     Triage Assessment     Row Name 05/17/22 09       Triage Assessment (Adult)    Airway WDL WDL       Respiratory WDL    Respiratory WDL WDL       Skin Circulation/Temperature WDL    Skin Circulation/Temperature WDL WDL       Cardiac WDL    Cardiac WDL WDL       Peripheral/Neurovascular WDL    Peripheral Neurovascular WDL WDL       Cognitive/Neuro/Behavioral WDL    Cognitive/Neuro/Behavioral WDL WDL

## 2022-05-17 NOTE — TELEPHONE ENCOUNTER
Patient calling. She is not feeling well. She vomited yesterday most of the day. Last time she vomited was 5:30 pm last night. Has been able to keep sips of water down. Patient said she feels very dehydrated. She has urinated at 8-10 times in the last 24 hours. Lips feel extremely chapped. Feels lethargic. Head spins. Chills.     Patient wants to be seen. RN reviewed options. Can see Bess in clinic but if she feels like she needs fluids, needs to go to the ED. Patient said she really feels like she needs fluids. She wants to be seen in the ED. She will head there now.     MELANY Greer, RN  [facility]

## 2022-05-17 NOTE — ED PROVIDER NOTES
History     Chief Complaint   Patient presents with     Nausea & Vomiting     HPI  Jyoti New is a 21 year old female who presents with vomiting.  This began 2 days ago.  She has had 15-20 episodes of emesis each day.  No diarrhea.  No focal abdominal pain.  Some cramping all over.  Subjective fever at home.  They have had a stomach bug going through their home she states.    Allergies:  No Known Allergies    Problem List:    Patient Active Problem List    Diagnosis Date Noted     Gallstone pancreatitis 02/27/2021     Priority: Medium     Cholelithiasis 02/27/2021     Priority: Medium     Postpartum care following vaginal delivery 02/27/2021     Priority: Medium     Breast feeding status of mother 02/27/2021     Priority: Medium     Pregnancy induced hypertension, antepartum 02/11/2021     Priority: Medium     Normal labor 02/06/2021     Priority: Medium     Labor and delivery, indication for care 02/01/2021     Priority: Medium     High-risk pregnancy supervision, unspecified trimester 12/09/2020     Priority: Medium        Past Medical History:    Past Medical History:   Diagnosis Date     Cholelithiasis 2/27/2021     Known health problems: none        Past Surgical History:    Past Surgical History:   Procedure Laterality Date     LAPAROSCOPIC CHOLECYSTECTOMY N/A 3/1/2021    Procedure: CHOLECYSTECTOMY, LAPAROSCOPIC;  Surgeon: Scott Hearn DO;  Location: [facility] OR     NO HISTORY OF SURGERY         Family History:    Family History   Problem Relation Age of Onset     No Known Problems Mother      Diabetes Father      Emphysema Father      No Known Problems Sister      Abdominal Aortic Aneurysm Brother      Diabetes Maternal Grandmother      Myocardial Infarction Maternal Grandmother      Skin Cancer Maternal Grandfather      Diabetes Maternal Grandfather      Myocardial Infarction Maternal Grandfather      Cancer Paternal Grandmother      No Known Problems Paternal Grandfather      Sudden Infant  Death Syndrome Sister      No Known Problems Half-Brother      No Known Problems Half-Brother      No Known Problems Brother      No Known Problems Brother      No Known Problems Brother        Social History:  Marital Status:  Single [1]  Social History     Tobacco Use     Smoking status: Current Every Day Smoker     Packs/day: 0.25     Smokeless tobacco: Never Used   Substance Use Topics     Alcohol use: Yes     Comment: occ     Drug use: Never        Medications:    ferrous sulfate (FEROSUL) 325 (65 Fe) MG tablet  naproxen sodium (ANAPROX) 220 MG tablet  ondansetron (ZOFRAN ODT) 4 MG ODT tab          Review of Systems  All other systems are reviewed and are negative    Physical Exam   BP: 120/78  Temp: 98.4  F (36.9  C)  Resp: 18  Weight: 81.1 kg (178 lb 12.8 oz)  SpO2: 97 %      Physical Exam  Vitals and nursing note reviewed.   Constitutional:       General: She is not in acute distress.     Appearance: She is well-developed. She is not diaphoretic.   HENT:      Head: Normocephalic and atraumatic.   Eyes:      General: No scleral icterus.     Pupils: Pupils are equal, round, and reactive to light.   Cardiovascular:      Rate and Rhythm: Normal rate and regular rhythm.      Heart sounds: Normal heart sounds. No murmur heard.  Pulmonary:      Effort: No respiratory distress.      Breath sounds: No stridor. No wheezing or rales.   Abdominal:      Palpations: Abdomen is soft.      Tenderness: There is no abdominal tenderness.   Musculoskeletal:         General: No tenderness.      Cervical back: Normal range of motion and neck supple.   Skin:     General: Skin is warm and dry.      Coloration: Skin is not pale.      Findings: No erythema or rash.   Neurological:      Mental Status: She is alert.         ED Course                 Procedures              Critical Care time:  none               No results found for this or any previous visit (from the past 24 hour(s)).    Medications   0.9% sodium chloride BOLUS  (1,000 mLs Intravenous New Bag 5/17/22 1027)   ondansetron (ZOFRAN) injection 4 mg (4 mg Intravenous Given 5/17/22 1027)       Assessments & Plan (with Medical Decision Making)  21-year-old female presents with vomiting over the last 2 days.  No focal abdominal tenderness.  Was sent here for IV fluids for dehydration.  No signs of acute emergency medical condition.  Given fluids and Zofran.  Zofran prescribed on an outpatient basis.  Have recommended follow-up in clinic if not improving in 3 days.  Return anytime sooner if condition worsens or other concern.     I have reviewed the nursing notes.    I have reviewed the findings, diagnosis, plan and need for follow up with the patient.       New Prescriptions    ONDANSETRON (ZOFRAN ODT) 4 MG ODT TAB    Take 1 tablet (4 mg) by mouth every 6 hours as needed for nausea       Final diagnoses:   Nausea and vomiting, intractability of vomiting not specified, unspecified vomiting type   Dehydration       5/17/2022   [facility] EMERGENCY DEPT     Curtis Zaidi MD  05/17/22 4785

## 2022-07-05 ENCOUNTER — HOSPITAL ENCOUNTER (EMERGENCY)
Facility: CLINIC | Age: 22
Discharge: HOME | End: 2022-07-05
Attending: EMERGENCY MEDICINE | Admitting: EMERGENCY MEDICINE
Payer: COMMERCIAL

## 2022-07-05 VITALS
SYSTOLIC BLOOD PRESSURE: 142 MMHG | BODY MASS INDEX: 25.9 KG/M2 | HEIGHT: 71 IN | HEART RATE: 80 BPM | DIASTOLIC BLOOD PRESSURE: 63 MMHG | RESPIRATION RATE: 16 BRPM | OXYGEN SATURATION: 98 % | TEMPERATURE: 98.4 F | WEIGHT: 185 LBS

## 2022-07-05 DIAGNOSIS — L30.9 VESICULAR FOOT ECZEMA: ICD-10-CM

## 2022-07-05 PROCEDURE — 99283 EMERGENCY DEPT VISIT LOW MDM: CPT | Performed by: EMERGENCY MEDICINE

## 2022-07-05 PROCEDURE — 99284 EMERGENCY DEPT VISIT MOD MDM: CPT | Performed by: EMERGENCY MEDICINE

## 2022-07-05 RX ORDER — TRIAMCINOLONE ACETONIDE 5 MG/G
1 OINTMENT TOPICAL 2 TIMES DAILY
Qty: 30 G | Refills: 0 | Status: SHIPPED | OUTPATIENT
Start: 2022-07-05 | End: 2023-06-14

## 2022-07-06 NOTE — DISCHARGE INSTRUCTIONS
You likely have vesicular eczema also known as dyshidrotic eczema.  Use the topical steroid ointment.   That should help to resolve this.  Return to the ER if spreading redness or no improvement or follow-up with your doctor.  It was a pleasure to meet you.

## 2022-07-06 NOTE — ED PROVIDER NOTES
History     Chief Complaint   Patient presents with     Rash     HPI  Jyoti New is a 21 year old female with a history of eczema who presents to the er secondary to a rash on the top of her left 4th toe.  It is has been irritating and itchy.  After itching it then can be painful.  It is irritated when wearing steel toe boots at work.  No spreading redness, fever, malaise or vomiting. No lesions between the toes, no oozing from the rash.       Allergies:  No Known Allergies    Problem List:    Patient Active Problem List    Diagnosis Date Noted     Gallstone pancreatitis 02/27/2021     Priority: Medium     Cholelithiasis 02/27/2021     Priority: Medium     Postpartum care following vaginal delivery 02/27/2021     Priority: Medium     Breast feeding status of mother 02/27/2021     Priority: Medium     Pregnancy induced hypertension, antepartum 02/11/2021     Priority: Medium     Normal labor 02/06/2021     Priority: Medium     Labor and delivery, indication for care 02/01/2021     Priority: Medium     High-risk pregnancy supervision, unspecified trimester 12/09/2020     Priority: Medium        Past Medical History:    Past Medical History:   Diagnosis Date     Cholelithiasis 2/27/2021     Known health problems: none        Past Surgical History:    Past Surgical History:   Procedure Laterality Date     LAPAROSCOPIC CHOLECYSTECTOMY N/A 3/1/2021    Procedure: CHOLECYSTECTOMY, LAPAROSCOPIC;  Surgeon: Scott Hearn DO;  Location: [facility] OR     NO HISTORY OF SURGERY         Family History:    Family History   Problem Relation Age of Onset     No Known Problems Mother      Diabetes Father      Emphysema Father      No Known Problems Sister      Abdominal Aortic Aneurysm Brother      Diabetes Maternal Grandmother      Myocardial Infarction Maternal Grandmother      Skin Cancer Maternal Grandfather      Diabetes Maternal Grandfather      Myocardial Infarction Maternal Grandfather      Cancer Paternal  "Grandmother      No Known Problems Paternal Grandfather      Sudden Infant Death Syndrome Sister      No Known Problems Half-Brother      No Known Problems Half-Brother      No Known Problems Brother      No Known Problems Brother      No Known Problems Brother        Social History:  Marital Status:  Single [1]  Social History     Tobacco Use     Smoking status: Current Every Day Smoker     Packs/day: 0.25     Smokeless tobacco: Never Used   Substance Use Topics     Alcohol use: Yes     Comment: occ     Drug use: Never        Medications:    triamcinolone (KENALOG) 0.5 % external ointment          Review of Systems   All other systems reviewed and are negative.      Physical Exam   BP: (!) 142/63  Pulse: 80  Temp: 98.4  F (36.9  C)  Resp: 16  Height: 180.3 cm (5' 11\")  Weight: 83.9 kg (185 lb)  SpO2: 98 %      Physical Exam  Vitals and nursing note reviewed.   Constitutional:       General: She is not in acute distress.     Appearance: She is well-developed. She is not diaphoretic.   HENT:      Head: Normocephalic and atraumatic.   Eyes:      General: No scleral icterus.  Musculoskeletal:         General: No swelling, tenderness, deformity or signs of injury. Normal range of motion.      Cervical back: Normal range of motion and neck supple.   Skin:     General: Skin is warm and dry.      Coloration: Skin is not pale.      Findings: No erythema or rash.      Comments: Clustered vesicular eruption on the top of the left 4th toe on an erythematous base.  Rash is dry. No oozing or fluctuance. Not overly warm to the touch.  No lesions in between the toes.    Neurological:      Mental Status: She is alert and oriented to person, place, and time.         ED Course                 Procedures                No results found for this or any previous visit (from the past 24 hour(s)).    Medications - No data to display    Assessments & Plan (with Medical Decision Making)  Rash on the top of the toes consistent with " dyshidrotic eczema or vesicular eczema in a patient with history of eczema.  Mostly this is not pruritic rash.  There is no lesions in between the toes that would suggest fungal in origin.  She does have a baseline of having eczema but has never had this type of eczema in the past.  I think the best treatment course would include topical steroids and I prescribed triamcinolone ointment.  I advised her to use it twice a day.  If no improvement or worsening symptoms she should return to the emergency department.  All questions answered and patient was discharged home in stable condition.     I have reviewed the nursing notes.    I have reviewed the findings, diagnosis, plan and need for follow up with the patient.      Discharge Medication List as of 7/5/2022  8:15 PM      START taking these medications    Details   triamcinolone (KENALOG) 0.5 % external ointment Apply 1 g topically 2 times daily, Disp-30 g, R-0, E-Prescribe             Final diagnoses:   Vesicular foot eczema       7/5/2022   [facility] EMERGENCY DEPT     Len Melara MD  07/05/22 2039

## 2022-07-06 NOTE — ED TRIAGE NOTES
Patient presents with rash/ lesion to L) 4th toes for about a week. She reports pain and itching. She states it looks like when she was bitten by a brown recluse spider. Teressa Roberts RN

## 2022-09-18 ENCOUNTER — HEALTH MAINTENANCE LETTER (OUTPATIENT)
Age: 22
End: 2022-09-18

## 2022-10-17 ENCOUNTER — MYC MEDICAL ADVICE (OUTPATIENT)
Dept: FAMILY MEDICINE | Facility: CLINIC | Age: 22
End: 2022-10-17

## 2022-11-10 ENCOUNTER — DOCUMENTATION ONLY (OUTPATIENT)
Dept: LAB | Facility: CLINIC | Age: 22
End: 2022-11-10

## 2022-11-10 ENCOUNTER — MYC MEDICAL ADVICE (OUTPATIENT)
Dept: FAMILY MEDICINE | Facility: CLINIC | Age: 22
End: 2022-11-10

## 2022-11-10 NOTE — PROGRESS NOTES
Jyoti New has an upcoming lab appointment:    Future Appointments   Date Time Provider Department Center   11[facility]11/2022  [facility]     Patient is scheduled for the following lab(s): HCG and HMPO due    There is no order available. Please review and place either future orders or HMPO (Review of Health Maintenance Protocol Orders), as appropriate.    Health Maintenance Due   Topic     CHLAMYDIA SCREENING      HEPATITIS C SCREENING      [place]

## 2022-11-14 ENCOUNTER — VIRTUAL VISIT (OUTPATIENT)
Dept: FAMILY MEDICINE | Facility: CLINIC | Age: 22
End: 2022-11-14
Payer: COMMERCIAL

## 2022-11-14 DIAGNOSIS — Z34.90 SUPERVISION OF NORMAL PREGNANCY: Primary | ICD-10-CM

## 2022-11-14 PROCEDURE — 99207 PR NO CHARGE NURSE ONLY: CPT | Mod: 93

## 2022-11-14 RX ORDER — PRENATAL VIT/IRON FUM/FOLIC AC 27MG-0.8MG
1 TABLET ORAL DAILY
COMMUNITY
End: 2023-06-14

## 2022-11-14 NOTE — PROGRESS NOTES
OB Intake phone visit with verbal patient permission.    [place] is not vaccinated against covid.

## 2022-11-21 LAB
ABO/RH(D): NORMAL
ANTIBODY SCREEN: NEGATIVE
SPECIMEN EXPIRATION DATE: NORMAL

## 2022-11-22 ENCOUNTER — LAB (OUTPATIENT)
Dept: LAB | Facility: CLINIC | Age: 22
End: 2022-11-22
Payer: COMMERCIAL

## 2022-11-22 ENCOUNTER — HOSPITAL ENCOUNTER (OUTPATIENT)
Dept: ULTRASOUND IMAGING | Facility: CLINIC | Age: 22
Discharge: HOME | End: 2022-11-22
Attending: FAMILY MEDICINE | Admitting: FAMILY MEDICINE
Payer: COMMERCIAL

## 2022-11-22 DIAGNOSIS — Z34.90 SUPERVISION OF NORMAL PREGNANCY: ICD-10-CM

## 2022-11-22 LAB
ERYTHROCYTE [DISTWIDTH] IN BLOOD BY AUTOMATED COUNT: 11.9 % (ref 10–15)
HCT VFR BLD AUTO: 38.2 % (ref 35–47)
HGB BLD-MCNC: 13.6 G/DL (ref 11.7–15.7)
MCH RBC QN AUTO: 30.5 PG (ref 26.5–33)
MCHC RBC AUTO-ENTMCNC: 35.6 G/DL (ref 31.5–36.5)
MCV RBC AUTO: 86 FL (ref 78–100)
PLATELET # BLD AUTO: 301 10E3/UL (ref 150–450)
RBC # BLD AUTO: 4.46 10E6/UL (ref 3.8–5.2)
WBC # BLD AUTO: 10.2 10E3/UL (ref 4–11)

## 2022-11-22 PROCEDURE — 86803 HEPATITIS C AB TEST: CPT

## 2022-11-22 PROCEDURE — 87340 HEPATITIS B SURFACE AG IA: CPT

## 2022-11-22 PROCEDURE — 86900 BLOOD TYPING SEROLOGIC ABO: CPT

## 2022-11-22 PROCEDURE — 86850 RBC ANTIBODY SCREEN: CPT

## 2022-11-22 PROCEDURE — 86901 BLOOD TYPING SEROLOGIC RH(D): CPT

## 2022-11-22 PROCEDURE — 86780 TREPONEMA PALLIDUM: CPT

## 2022-11-22 PROCEDURE — 85027 COMPLETE CBC AUTOMATED: CPT

## 2022-11-22 PROCEDURE — 87086 URINE CULTURE/COLONY COUNT: CPT

## 2022-11-22 PROCEDURE — 87389 HIV-1 AG W/HIV-1&-2 AB AG IA: CPT

## 2022-11-22 PROCEDURE — 76801 OB US < 14 WKS SINGLE FETUS: CPT

## 2022-11-22 PROCEDURE — 86762 RUBELLA ANTIBODY: CPT

## 2022-11-22 PROCEDURE — 36415 COLL VENOUS BLD VENIPUNCTURE: CPT

## 2022-11-23 LAB
HBV SURFACE AG SERPL QL IA: NONREACTIVE
HCV AB SERPL QL IA: NONREACTIVE
HIV 1+2 AB+HIV1 P24 AG SERPL QL IA: NONREACTIVE
RUBV IGG SERPL QL IA: 5.57 INDEX
RUBV IGG SERPL QL IA: POSITIVE
T PALLIDUM AB SER QL: NONREACTIVE

## 2022-11-24 LAB — BACTERIA UR CULT: NORMAL

## 2022-12-01 ENCOUNTER — MYC MEDICAL ADVICE (OUTPATIENT)
Dept: FAMILY MEDICINE | Facility: CLINIC | Age: 22
End: 2022-12-01

## 2022-12-02 ENCOUNTER — PRENATAL OFFICE VISIT (OUTPATIENT)
Dept: FAMILY MEDICINE | Facility: CLINIC | Age: 22
End: 2022-12-02
Payer: COMMERCIAL

## 2022-12-02 VITALS
HEART RATE: 96 BPM | DIASTOLIC BLOOD PRESSURE: 70 MMHG | WEIGHT: 194.38 LBS | SYSTOLIC BLOOD PRESSURE: 112 MMHG | OXYGEN SATURATION: 98 % | RESPIRATION RATE: 16 BRPM | TEMPERATURE: 97 F | BODY MASS INDEX: 27.11 KG/M2

## 2022-12-02 DIAGNOSIS — O09.90 HIGH-RISK PREGNANCY SUPERVISION, UNSPECIFIED TRIMESTER: Primary | ICD-10-CM

## 2022-12-02 LAB
AMPHETAMINES UR QL: NOT DETECTED
BARBITURATES UR QL SCN: NOT DETECTED
BENZODIAZ UR QL SCN: NOT DETECTED
BUPRENORPHINE UR QL: NOT DETECTED
CANNABINOIDS UR QL: NOT DETECTED
COCAINE UR QL SCN: NOT DETECTED
D-METHAMPHET UR QL: NOT DETECTED
METHADONE UR QL SCN: NOT DETECTED
OPIATES UR QL SCN: NOT DETECTED
OXYCODONE UR QL SCN: NOT DETECTED
PCP UR QL SCN: NOT DETECTED
PROPOXYPH UR QL: NOT DETECTED
TRICYCLICS UR QL SCN: NOT DETECTED

## 2022-12-02 PROCEDURE — 80306 DRUG TEST PRSMV INSTRMNT: CPT | Performed by: FAMILY MEDICINE

## 2022-12-02 PROCEDURE — 99207 PR FIRST OB VISIT: CPT | Performed by: FAMILY MEDICINE

## 2022-12-02 ASSESSMENT — PAIN SCALES - GENERAL: PAINLEVEL: NO PAIN (0)

## 2022-12-02 NOTE — LETTER
[address] [address]  [phone]        December 2, 2022    Regarding:  Jyoti KODI New  [address] [address] [place] [phone]              To Whom It May Concern;  Has been off work due to illness. Will return (assuming she's better) on Monday 12/5.           Sincerely,        Jose Burton MD

## 2022-12-02 NOTE — PROGRESS NOTES
SUBJECTIVE:                                                    Jyoti New is a 22 year old female  @ 10w3d  who presents to clinic today for a first OB:       Estimated Date of Delivery: 2023 is calculated from LMP  She does not have a history of STDs or illicit drug use.  This is a Not Planned pregnancy and NEW father is involved.  We reviewed her prenatal labs done thus far.    Prior HTN, no preeclampsia    Prenatal Labs:   Lab Results   Component Value Date    ABO A 2021    RH Pos 2021    AS Negative 2022    HEPBANG Nonreactive 2022    HGB 13.6 2022       She's feeling well, without significant fatigue or n/v.   She does not plan on breast feeding. Benefits were discussed in detail.  First trimester aneuploidy screening discussed      Problem list and histories reviewed & adjusted, as indicated.  Additional history:     Patient Active Problem List   Diagnosis     High-risk pregnancy supervision, unspecified trimester     Labor and delivery, indication for care     Normal labor     Pregnancy induced hypertension, antepartum     Gallstone pancreatitis     Cholelithiasis     Postpartum care following vaginal delivery     Breast feeding status of mother     Past Surgical History:   Procedure Laterality Date     LAPAROSCOPIC CHOLECYSTECTOMY N/A 2021    Procedure: CHOLECYSTECTOMY, LAPAROSCOPIC;  Surgeon: Scott Hearn DO;  Location:  OR       Social History     Tobacco Use     Smoking status: Former     Packs/day: 0.25     Types: Cigarettes     Quit date: 2022     Years since quittin.2     Smokeless tobacco: Never   Substance Use Topics     Alcohol use: Yes     Comment: occ     Family History   Problem Relation Age of Onset     No Known Problems Mother      Diabetes Father      Emphysema Father      No Known Problems Sister      Sudden Infant Death Syndrome Sister      Abdominal Aortic Aneurysm Brother      No Known Problems Brother      No  Known Problems Brother      No Known Problems Brother      Diabetes Maternal Grandmother      Myocardial Infarction Maternal Grandmother      Skin Cancer Maternal Grandfather      Diabetes Maternal Grandfather      Myocardial Infarction Maternal Grandfather      Cancer Paternal Grandmother      No Known Problems Paternal Grandfather      No Known Problems Half-Brother      No Known Problems Half-Brother      No Known Problems Daughter      Cancer Maternal Aunt            ROS:  Constitutional, HEENT, cardiovascular, pulmonary, gi and gu systems are negative, except as otherwise noted.    OBJECTIVE:                                                    /70 (BP Location: Right arm, Patient Position: Sitting, Cuff Size: Adult Regular)   Pulse 96   Temp 97  F (36.1  C) (Tympanic)   Resp 16   Wt 88.2 kg (194 lb 6 oz)   LMP 09/20/2022   SpO2 98%   BMI 27.11 kg/m    Body mass index is 27.11 kg/m .  GENERAL: healthy, alert and no distress  NECK: no adenopathy, no asymmetry, masses, or scars and thyroid normal to palpation  RESP: lungs clear to auscultation - no rales, rhonchi or wheezes  CV: regular rate and rhythm, normal S1 S2, no S3 or S4, no murmur, click or rub, no peripheral edema and peripheral pulses strong  ABDOMEN: soft, nontender, no hepatosplenomegaly, no masses and bowel sounds normal   (female): normal female external genitalia, normal urethral meatus, vaginal mucosa, normal cervix/adnexa/uterus without masses or discharge  PELVIS:   Declined today till next week  MS: no gross musculoskeletal defects noted, no edema       ASSESSMENT/PLAN:                                                        ICD-10-CM    1. High-risk pregnancy supervision, unspecified trimester  O09.90 Drug Abuse Screen Panel 13, Urine (Pain Care Package) - lab collect     Drug Abuse Screen Panel 13, Urine (Pain Care Package) - lab collect          History of gestational hypertension   declined prenatal screening options thus  far    Instructed on use of triage nurse line for an urgent need such as fever, vagina bleeding, bladder or vaginal infection, rupture of membranes,  or term labor. Instructed on best evidence for: weight gain for her BMI for pregnancy; healthy diet and foods to avoid; exercise and activity during pregnancy;avoiding exposure to toxoplasmosis; and maintenance of a generally healthy lifestyle.     Prenatal screening discussion: We discussed that a genetic test is optional. The cell free DNA test looks for a few issues with baby such as Down's syndrome and similar anueploidy. The Standard panel can look into mom's DNA to screen for carrier status of multiple genetic issues. A third option would be to test for cystic fibrosis and spinal muscular atrophy as recommended as basic screening per ACOG, and adding Fragile X premutation carrier screening with a family history of intellectual disability, developmental delay, or autism. Gender only testing is also an option. Can also consider quad screen at 15-18wks and nuchal translucency testing at 10-13wks. Cell free DNA testing can be done anytime after 10wks. AFP measurements can be done, but the 20 wk US adequately looks at the spine for spina bifida.     RECOMMENDED WEIGHT GAIN: 15-25 lbs.    Jose Burton MD  [facility]

## 2022-12-13 ENCOUNTER — MYC MEDICAL ADVICE (OUTPATIENT)
Dept: FAMILY MEDICINE | Facility: CLINIC | Age: 22
End: 2022-12-13

## 2022-12-13 ENCOUNTER — HOSPITAL ENCOUNTER (EMERGENCY)
Facility: CLINIC | Age: 22
Discharge: HOME | End: 2022-12-13
Attending: FAMILY MEDICINE | Admitting: FAMILY MEDICINE
Payer: COMMERCIAL

## 2022-12-13 ENCOUNTER — APPOINTMENT (OUTPATIENT)
Dept: GENERAL RADIOLOGY | Facility: CLINIC | Age: 22
End: 2022-12-13
Attending: EMERGENCY MEDICINE
Payer: COMMERCIAL

## 2022-12-13 VITALS
TEMPERATURE: 98 F | WEIGHT: 194 LBS | HEART RATE: 78 BPM | SYSTOLIC BLOOD PRESSURE: 114 MMHG | DIASTOLIC BLOOD PRESSURE: 88 MMHG | OXYGEN SATURATION: 98 % | RESPIRATION RATE: 18 BRPM | BODY MASS INDEX: 27.06 KG/M2

## 2022-12-13 DIAGNOSIS — M25.512 ACUTE PAIN OF LEFT SHOULDER: ICD-10-CM
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
## 2022-12-13 PROCEDURE — 99283 EMERGENCY DEPT VISIT LOW MDM: CPT | Performed by: FAMILY MEDICINE

## 2022-12-13 PROCEDURE — 73030 X-RAY EXAM OF SHOULDER: CPT | Mod: LT

## 2022-12-13 PROCEDURE — 99282 EMERGENCY DEPT VISIT SF MDM: CPT | Performed by: FAMILY MEDICINE

## 2022-12-13 NOTE — LETTER
[facility]  Emergency Room  [address]  [place]   [address]  Tel: [phone]   Fax: [phone]  2022    Jyoti New  [address] [address] [place] [phone]  [phone] (home)     : 2000          To Whom it May Concern:    Jyoti New was seen in our ER today, 2022. I expect her condition to improve over the next 7 days.  She may return to workl, without limited use of left arm for the next 3-7 days.  If not improved during the above expected time period,  then I have encouraged her to be rechecked in her clinic for further evaluation.      Please contact me for questions or concerns.    Sincerely,       Jorge Snyder MD

## 2022-12-14 NOTE — DISCHARGE INSTRUCTIONS
Your x-rays do not reveal a fracture.  Ice the left shoulder frequently for the next 2-3 days, and then add heat.  Take Tylenol as needed for pain.  Follow-up for your left shoulder if you are still having pain in 7-10 days.  Monitor for any abdominal/pelvic cramping or bleeding.  Follow-up with Dr. Burton for your prenatal visit.  Return to the emergency department at any time if you develop new or worsening symptoms.
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
## 2022-12-30 ENCOUNTER — PRENATAL OFFICE VISIT (OUTPATIENT)
Dept: FAMILY MEDICINE | Facility: CLINIC | Age: 22
End: 2022-12-30
Payer: COMMERCIAL

## 2022-12-30 VITALS
OXYGEN SATURATION: 98 % | HEART RATE: 68 BPM | DIASTOLIC BLOOD PRESSURE: 62 MMHG | BODY MASS INDEX: 27.69 KG/M2 | SYSTOLIC BLOOD PRESSURE: 112 MMHG | WEIGHT: 198.5 LBS | TEMPERATURE: 98.5 F

## 2022-12-30 DIAGNOSIS — O09.90 HIGH-RISK PREGNANCY SUPERVISION, UNSPECIFIED TRIMESTER: Primary | ICD-10-CM

## 2022-12-30 PROCEDURE — 99207 PR PRENATAL VISIT: CPT | Performed by: FAMILY MEDICINE

## 2022-12-30 ASSESSMENT — PAIN SCALES - GENERAL: PAINLEVEL: NO PAIN (0)
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
## 2023-01-27 ENCOUNTER — PRENATAL OFFICE VISIT (OUTPATIENT)
Dept: FAMILY MEDICINE | Facility: CLINIC | Age: 23
End: 2023-01-27
Payer: COMMERCIAL

## 2023-01-27 VITALS
HEART RATE: 78 BPM | SYSTOLIC BLOOD PRESSURE: 105 MMHG | OXYGEN SATURATION: 98 % | RESPIRATION RATE: 17 BRPM | TEMPERATURE: 97.3 F | BODY MASS INDEX: 27.59 KG/M2 | WEIGHT: 197.8 LBS | DIASTOLIC BLOOD PRESSURE: 65 MMHG

## 2023-01-27 DIAGNOSIS — O09.90 HIGH-RISK PREGNANCY SUPERVISION, UNSPECIFIED TRIMESTER: Primary | ICD-10-CM

## 2023-01-27 PROCEDURE — 99207 PR PRENATAL VISIT: CPT | Performed by: FAMILY MEDICINE

## 2023-01-27 NOTE — PROGRESS NOTES
Concerns today:    Doing well.  No concerns today.  No nausea/vomiting. No heartburn.  No vaginal bleeding, no contractions/severe cramping, no leakage of fluid.  No vaginal discharge. No dysuria  No headache, vision changes, lower extremity swelling, upper abdominal pain, chest pain, shortness of breath.   Shoulder better   Letter for work      Jose Burton MD

## 2023-01-27 NOTE — LETTER
[address] [address]  [phone]        January 27, 2023    Regarding:  Jyoti New  [address] [address] [place] [id number]              To Whom It May Concern;  She has been at the office 12/30, 12/2. She has monthly appts.           Sincerely,        Jose Burton MD

## 2023-02-08 ENCOUNTER — HOSPITAL ENCOUNTER (EMERGENCY)
Facility: CLINIC | Age: 23
End: 2023-02-08
Payer: COMMERCIAL

## 2023-02-08 ENCOUNTER — HOSPITAL ENCOUNTER (OUTPATIENT)
Facility: CLINIC | Age: 23
End: 2023-02-08
Admitting: OBSTETRICS & GYNECOLOGY
Payer: COMMERCIAL

## 2023-02-08 ENCOUNTER — HOSPITAL ENCOUNTER (OUTPATIENT)
Facility: CLINIC | Age: 23
Discharge: HOME | End: 2023-02-08
Attending: OBSTETRICS & GYNECOLOGY | Admitting: OBSTETRICS & GYNECOLOGY
Payer: COMMERCIAL

## 2023-02-08 VITALS
TEMPERATURE: 97.4 F | HEART RATE: 73 BPM | SYSTOLIC BLOOD PRESSURE: 107 MMHG | RESPIRATION RATE: 16 BRPM | DIASTOLIC BLOOD PRESSURE: 68 MMHG

## 2023-02-08 PROBLEM — R10.2 PELVIC CRAMPING: Status: ACTIVE | Noted: 2023-02-08

## 2023-02-08 LAB
ALBUMIN UR-MCNC: NEGATIVE MG/DL
AMORPH CRY #/AREA URNS HPF: ABNORMAL /HPF
APPEARANCE UR: CLEAR
BILIRUB UR QL STRIP: NEGATIVE
COLOR UR AUTO: YELLOW
GLUCOSE UR STRIP-MCNC: NEGATIVE MG/DL
HGB UR QL STRIP: ABNORMAL
KETONES UR STRIP-MCNC: NEGATIVE MG/DL
LEUKOCYTE ESTERASE UR QL STRIP: NEGATIVE
MUCOUS THREADS #/AREA URNS LPF: PRESENT /LPF
NITRATE UR QL: NEGATIVE
PH UR STRIP: 6.5 [PH] (ref 5–7)
RBC URINE: <1 /HPF
SP GR UR STRIP: 1.02 (ref 1–1.03)
SQUAMOUS EPITHELIAL: 1 /HPF
UROBILINOGEN UR STRIP-MCNC: NORMAL MG/DL
WBC URINE: 2 /HPF

## 2023-02-08 PROCEDURE — 99212 OFFICE O/P EST SF 10 MIN: CPT | Performed by: OBSTETRICS & GYNECOLOGY

## 2023-02-08 PROCEDURE — G0463 HOSPITAL OUTPT CLINIC VISIT: HCPCS

## 2023-02-08 PROCEDURE — 81001 URINALYSIS AUTO W/SCOPE: CPT | Performed by: OBSTETRICS & GYNECOLOGY

## 2023-02-08 RX ORDER — LIDOCAINE 40 MG/G
CREAM TOPICAL
Status: DISCONTINUED | OUTPATIENT
Start: 2023-02-08 | End: 2023-02-08 | Stop reason: HOSPADM

## 2023-02-08 ASSESSMENT — ACTIVITIES OF DAILY LIVING (ADL)
ADLS_ACUITY_SCORE: 35
ADLS_ACUITY_SCORE: 35

## 2023-02-08 NOTE — PROGRESS NOTES
S:  Discharge from triage.   A:  FHT's 150 per doptone. Pt states having some cramping.  Cervical exam not performed. Dr. Devries in to visualize vaginal outlet for bleeding - none noted.  R:  Written and verbal D/C instruction provided. Pt. Verbalized understanding of D/C instructions and will follow up with Dr. Burton as previously scheduled.   Verbalizes understanding that she will call or return to [facility] with any further questions or concerns.   Pt. D/C'd via walking with mother.    Nursing Discharge Checklist  Discharge order entered: Yes  Patient care order entered: Yes  Charges entered: Yes  IV start and stop times have been documented in Epic? No  NST start and stop times have been documented in Epic Doc F/S? No

## 2023-02-08 NOTE — PROGRESS NOTES
Met with patient and chart reviewed.  Pt presents at 20+1 weeks by 8+6 week US.  Pt has been having pelvic cramps for 3 days.  Began having spotting today while at work.  Notes the cramps come and go periodically.  No recent infections, coitus, illness, diarrhea/constipation.  Pt has not had 20 week US as of yet.    No rashes, itching, burning, discharge, odors    Exam  Mom comfortable in bed, no distress and no evidence of contractions while this provider we in the room.  Uterus is non tender.  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  She has been told by others to limit her lifting but not complying    A/P  20 week pregnancy with cramps and spotting (today)  Comfortable at this point and not bleeding on limited exam.  Suggest she monitor and return if this continues.  Suggest no work for next 2 days  If returning will get US

## 2023-02-08 NOTE — PROGRESS NOTES
"S: Triage Arrival  B: Jyoti is a 22 y.o.  @ 20w 1d who presents with complaint of cramping and spotting.  A: FHTs 150 per doptone. Contractions not palpated. Pt states \"quick cramps\" that quickly come and go. Pt had pinkish discharge after she wiped at home.  R: Will notify MD to obtain further orders.  "

## 2023-02-08 NOTE — DISCHARGE INSTRUCTIONS
OB Triage Discharge Instructions    Diet:  Regular diet as tolerated    Activity:  As tolerated    Other Special Instructions: BRAT diet ;), hold off on the shrimp for a few days, lifting restricted to 50#.    Reason for being seen in L&D: cramping, spotting    Treatment/procedures performed in L&D: urinalysis, visualization for bleeding    Call [facility] at [phone] if you have:  5 or more contractions in one hour  Leaking of fluid from your vaginal area  Decreased fetal movement (follow kick count instructions)  Bleeding from your vaginal area  Swelling in your face, or increased swelling in your hands or legs  Headaches or vision problems such as blurring or seeing spots or starts  Nausea or vomiting lasting for more than 24 hours  An increase in weight (5lbs/week)  Epigastric pain (sometimes confused with heartburn that does not go away or a very bad stomach ache)    If you have any questions, please follow up with your doctor.        Patient Signature: ______________________________________________________________  By signing the above I acknowledge that a nurse or my care provider has explained the instruction to me and I have had any questions regarding my care explained to me.        Discharge Nurse Signature: _______________________________ 2/8/2023  4:44 PM    Method of discharge: walking    Accompanied by: mom  Time of discharge: 1650

## 2023-02-17 ENCOUNTER — HOSPITAL ENCOUNTER (OUTPATIENT)
Dept: ULTRASOUND IMAGING | Facility: CLINIC | Age: 23
Discharge: HOME | End: 2023-02-17
Attending: FAMILY MEDICINE | Admitting: FAMILY MEDICINE
Payer: COMMERCIAL

## 2023-02-17 DIAGNOSIS — O09.90 HIGH-RISK PREGNANCY SUPERVISION, UNSPECIFIED TRIMESTER: ICD-10-CM

## 2023-02-17 PROCEDURE — 76805 OB US >/= 14 WKS SNGL FETUS: CPT

## 2023-03-03 ENCOUNTER — PRENATAL OFFICE VISIT (OUTPATIENT)
Dept: FAMILY MEDICINE | Facility: CLINIC | Age: 23
End: 2023-03-03
Payer: COMMERCIAL

## 2023-03-03 VITALS
DIASTOLIC BLOOD PRESSURE: 62 MMHG | SYSTOLIC BLOOD PRESSURE: 112 MMHG | WEIGHT: 203 LBS | TEMPERATURE: 97.7 F | RESPIRATION RATE: 16 BRPM | BODY MASS INDEX: 28.31 KG/M2 | OXYGEN SATURATION: 97 % | HEART RATE: 90 BPM

## 2023-03-03 DIAGNOSIS — O09.90 HIGH-RISK PREGNANCY SUPERVISION, UNSPECIFIED TRIMESTER: Primary | ICD-10-CM

## 2023-03-03 PROBLEM — Z37.9 NORMAL LABOR: Status: RESOLVED | Noted: 2021-02-06 | Resolved: 2023-03-03

## 2023-03-03 PROBLEM — R10.2 PELVIC CRAMPING: Status: RESOLVED | Noted: 2023-02-08 | Resolved: 2023-03-03

## 2023-03-03 PROCEDURE — 99207 PR PRENATAL VISIT: CPT | Performed by: FAMILY MEDICINE

## 2023-03-03 ASSESSMENT — PAIN SCALES - GENERAL: PAINLEVEL: NO PAIN (0)

## 2023-03-03 NOTE — PROGRESS NOTES
Concerns today:   Doing well   No nausea/vomiting. No heartburn.  No vaginal bleeding, no contractions/severe cramping, no leakage of fluid.  No vaginal discharge. No dysuria  No headache, vision changes, lower extremity swelling, upper abdominal pain, chest pain, shortness of breath.     Pregnancy risks   1. History of severe PreEc @ 37w NEEDS LDA   2. History of adhd, dep/anx, reading disorder   3. Father not involved   4. Rapid labors          Prenatal care plan              - prior deliveries: , PreEc  -OB Labs of concern: none   - Rh- pos              - First trimester screening:  Discussed completed              - Second trimester screening:  Discussed completed              - Labor pain management:                - Ped:  RH              - Circumcision if boy: Yes              - BC:               - Breast feeding:  Yes              - Covid 19 vaccine: NO              - influenza vaccine: NO              - Tdap       Jose Burton MD

## 2023-04-07 ENCOUNTER — PRENATAL OFFICE VISIT (OUTPATIENT)
Dept: FAMILY MEDICINE | Facility: CLINIC | Age: 23
End: 2023-04-07
Payer: COMMERCIAL

## 2023-04-07 VITALS
SYSTOLIC BLOOD PRESSURE: 102 MMHG | TEMPERATURE: 98.2 F | OXYGEN SATURATION: 98 % | WEIGHT: 206 LBS | HEIGHT: 68 IN | HEART RATE: 79 BPM | BODY MASS INDEX: 31.22 KG/M2 | RESPIRATION RATE: 16 BRPM | DIASTOLIC BLOOD PRESSURE: 64 MMHG

## 2023-04-07 DIAGNOSIS — O09.90 HIGH-RISK PREGNANCY SUPERVISION, UNSPECIFIED TRIMESTER: ICD-10-CM

## 2023-04-07 DIAGNOSIS — Z34.03 ENCOUNTER FOR SUPERVISION OF NORMAL FIRST PREGNANCY IN THIRD TRIMESTER: Primary | ICD-10-CM

## 2023-04-07 LAB — GLUCOSE 1H P 50 G GLC PO SERPL-MCNC: 103 MG/DL (ref 70–129)

## 2023-04-07 PROCEDURE — 99207 PR PRENATAL VISIT: CPT | Performed by: FAMILY MEDICINE

## 2023-04-07 PROCEDURE — 36415 COLL VENOUS BLD VENIPUNCTURE: CPT | Performed by: FAMILY MEDICINE

## 2023-04-07 PROCEDURE — 82950 GLUCOSE TEST: CPT | Performed by: FAMILY MEDICINE

## 2023-04-07 PROCEDURE — 90471 IMMUNIZATION ADMIN: CPT | Performed by: FAMILY MEDICINE

## 2023-04-07 PROCEDURE — 90715 TDAP VACCINE 7 YRS/> IM: CPT | Performed by: FAMILY MEDICINE

## 2023-04-07 RX ORDER — ASPIRIN 81 MG/1
81 TABLET, CHEWABLE ORAL DAILY
Qty: 90 TABLET | Refills: 0 | Status: SHIPPED | OUTPATIENT
Start: 2023-04-07 | End: 2023-06-14

## 2023-04-07 ASSESSMENT — PAIN SCALES - GENERAL: PAINLEVEL: NO PAIN (0)

## 2023-04-07 NOTE — PROGRESS NOTES
Concerns:    Doing well.  No concerns today.  No vaginal bleeding, loss of fluid, or contractions  Tdap given today  Discussed kick counts and fetal movement.  Discussed PTL, PROM, and when to call or come in.  RTC in 2 weeks.    Pregnancy risks              1. History of severe PreEc @ 37w on LDA              2. History of adhd, dep/anx, reading disorder              3. Father not involved              4. Rapid labors                      Prenatal care plan              - prior deliveries: , PreEc  -OB Labs of concern: none              - Rh- pos              - First trimester screening:  Discussed completed              - Second trimester screening:  Discussed completed              - Labor pain management:                - Ped:  RH              - Circumcision if boy: Yes              - BC:               - Breast feeding:  Yes              - Covid 19 vaccine: NO              - influenza vaccine: NO              - Tdap done  Jose Burton MD

## 2023-04-21 ENCOUNTER — PRENATAL OFFICE VISIT (OUTPATIENT)
Dept: FAMILY MEDICINE | Facility: CLINIC | Age: 23
End: 2023-04-21
Payer: COMMERCIAL

## 2023-04-21 VITALS
HEIGHT: 68 IN | WEIGHT: 214.6 LBS | BODY MASS INDEX: 32.52 KG/M2 | OXYGEN SATURATION: 97 % | SYSTOLIC BLOOD PRESSURE: 100 MMHG | HEART RATE: 93 BPM | DIASTOLIC BLOOD PRESSURE: 62 MMHG | RESPIRATION RATE: 18 BRPM | TEMPERATURE: 97.2 F

## 2023-04-21 DIAGNOSIS — O09.90 HIGH-RISK PREGNANCY SUPERVISION, UNSPECIFIED TRIMESTER: Primary | ICD-10-CM

## 2023-04-21 DIAGNOSIS — H61.21 IMPACTED CERUMEN OF RIGHT EAR: ICD-10-CM

## 2023-04-21 LAB — HGB BLD-MCNC: 12.3 G/DL (ref 11.7–15.7)

## 2023-04-21 PROCEDURE — 36415 COLL VENOUS BLD VENIPUNCTURE: CPT | Performed by: FAMILY MEDICINE

## 2023-04-21 PROCEDURE — 99207 PR PRENATAL VISIT: CPT | Performed by: FAMILY MEDICINE

## 2023-04-21 ASSESSMENT — PAIN SCALES - GENERAL: PAINLEVEL: NO PAIN (0)

## 2023-04-21 NOTE — PROGRESS NOTES
Concerns:    Doing well.  No concerns today.  No vaginal bleeding, LOF.  No contractions.  Discussed kick counts and fetal movement.  Discussed PTL, PROM, and when to call or come in.  RTC 2 weeks.    Pregnancy risks              1. History of severe PreEc @ 37w on LDA              2. History of adhd, dep/anx, reading disorder              3. Father not involved              4. Rapid labors                      Prenatal care plan              - prior deliveries: , PreEc  -OB Labs of concern: none              - Rh- pos              - First trimester screening:  Discussed completed              - Second trimester screening:  Discussed completed              - Labor pain management:                - Ped:  RH              - Circumcision if boy: Yes              - BC:               - Breast feeding:  Yes              - Covid 19 vaccine: NO              - influenza vaccine: NO              - Tdap done      Jose Burton MD

## 2023-05-05 ENCOUNTER — PRENATAL OFFICE VISIT (OUTPATIENT)
Dept: FAMILY MEDICINE | Facility: CLINIC | Age: 23
End: 2023-05-05
Payer: COMMERCIAL

## 2023-05-05 VITALS
WEIGHT: 213 LBS | BODY MASS INDEX: 32.39 KG/M2 | SYSTOLIC BLOOD PRESSURE: 122 MMHG | DIASTOLIC BLOOD PRESSURE: 72 MMHG | HEART RATE: 80 BPM | RESPIRATION RATE: 18 BRPM

## 2023-05-05 DIAGNOSIS — O09.90 HIGH-RISK PREGNANCY SUPERVISION, UNSPECIFIED TRIMESTER: Primary | ICD-10-CM

## 2023-05-05 PROCEDURE — 99207 PR PRENATAL VISIT: CPT | Performed by: FAMILY MEDICINE

## 2023-05-05 ASSESSMENT — PAIN SCALES - GENERAL: PAINLEVEL: NO PAIN (1)

## 2023-05-05 NOTE — PROGRESS NOTES
Concerns: none  Doing well.  No concerns today.  No vaginal bleeding, LOF.  No contractions.  Discussed kick counts and fetal movement.  Discussed PTL, PROM, and when to call or come in.  RTC 2 weeks.  Wt stable      Pregnancy risks              1. History of severe PreEc @ 37w on LDA              2. History of adhd, dep/anx, reading disorder              3. Father not involved              4. Rapid labors                      Prenatal care plan              - prior deliveries: , PreEc  -OB Labs of concern: none              - Rh- pos              - First trimester screening:  Discussed completed              - Second trimester screening:  Discussed completed              - Labor pain management:                - Ped:  RH              - Circumcision if boy: Yes              - BC:               - Breast feeding:  Yes              - Covid 19 vaccine: NO              - influenza vaccine: NO              - Tdap done  Jose Burton MD

## 2023-05-07 ENCOUNTER — HEALTH MAINTENANCE LETTER (OUTPATIENT)
Age: 23
End: 2023-05-07

## 2023-05-19 ENCOUNTER — PRENATAL OFFICE VISIT (OUTPATIENT)
Dept: FAMILY MEDICINE | Facility: CLINIC | Age: 23
End: 2023-05-19
Payer: COMMERCIAL

## 2023-05-19 VITALS
WEIGHT: 219.8 LBS | DIASTOLIC BLOOD PRESSURE: 71 MMHG | RESPIRATION RATE: 17 BRPM | HEART RATE: 78 BPM | BODY MASS INDEX: 33.42 KG/M2 | OXYGEN SATURATION: 97 % | SYSTOLIC BLOOD PRESSURE: 114 MMHG | TEMPERATURE: 97.6 F

## 2023-05-19 DIAGNOSIS — O09.90 HIGH-RISK PREGNANCY SUPERVISION, UNSPECIFIED TRIMESTER: Primary | ICD-10-CM

## 2023-05-19 PROCEDURE — 99207 PR PRENATAL VISIT: CPT | Performed by: FAMILY MEDICINE

## 2023-06-01 ENCOUNTER — PRENATAL OFFICE VISIT (OUTPATIENT)
Dept: FAMILY MEDICINE | Facility: CLINIC | Age: 23
End: 2023-06-01
Payer: COMMERCIAL

## 2023-06-01 VITALS
RESPIRATION RATE: 18 BRPM | SYSTOLIC BLOOD PRESSURE: 112 MMHG | TEMPERATURE: 97.1 F | WEIGHT: 217.2 LBS | OXYGEN SATURATION: 99 % | BODY MASS INDEX: 32.17 KG/M2 | HEIGHT: 69 IN | DIASTOLIC BLOOD PRESSURE: 62 MMHG | HEART RATE: 90 BPM

## 2023-06-01 DIAGNOSIS — Z34.83 ENCOUNTER FOR SUPERVISION OF OTHER NORMAL PREGNANCY IN THIRD TRIMESTER: Primary | ICD-10-CM

## 2023-06-01 PROCEDURE — 87653 STREP B DNA AMP PROBE: CPT | Performed by: FAMILY MEDICINE

## 2023-06-01 PROCEDURE — 99207 PR PRENATAL VISIT: CPT | Performed by: FAMILY MEDICINE

## 2023-06-01 ASSESSMENT — PAIN SCALES - GENERAL: PAINLEVEL: NO PAIN (0)

## 2023-06-01 NOTE — PROGRESS NOTES
Concerns:    .  Doing well.  No concerns today.  No vaginal bleeding, LOF, contractions.  No HA, RUQ pain, N/V, visual changes.  GBS done today.  Labor precautions discussed.  RTC 1 week.  Prenatal flowsheet information is reviewed.        Pregnancy risks              1. History of severe PreEc @ 37w on LDA              2. History of adhd, dep/anx, reading disorder              3. Father not involved              4. Rapid labors                      Prenatal care plan              - prior deliveries: , PreEc  -OB Labs of concern: none              - Rh- pos              - First trimester screening:  Discussed completed              - Second trimester screening:  Discussed completed              - Labor pain management:                - Ped:  RH              - Circumcision if boy: Yes              - BC:               - Breast feeding:  Yes              - Covid 19 vaccine: NO              - influenza vaccine: NO              - Tdap done      Jose Burton MD

## 2023-06-02 LAB — GP B STREP DNA SPEC QL NAA+PROBE: NEGATIVE

## 2023-06-07 ENCOUNTER — PRENATAL OFFICE VISIT (OUTPATIENT)
Dept: FAMILY MEDICINE | Facility: CLINIC | Age: 23
End: 2023-06-07
Payer: COMMERCIAL

## 2023-06-07 VITALS
WEIGHT: 218.6 LBS | SYSTOLIC BLOOD PRESSURE: 120 MMHG | DIASTOLIC BLOOD PRESSURE: 66 MMHG | TEMPERATURE: 97.6 F | BODY MASS INDEX: 33.13 KG/M2 | HEIGHT: 68 IN | RESPIRATION RATE: 24 BRPM | HEART RATE: 94 BPM | OXYGEN SATURATION: 97 %

## 2023-06-07 DIAGNOSIS — O09.90 HIGH-RISK PREGNANCY SUPERVISION, UNSPECIFIED TRIMESTER: Primary | ICD-10-CM

## 2023-06-07 PROCEDURE — 99207 PR PRENATAL VISIT: CPT | Performed by: FAMILY MEDICINE

## 2023-06-07 ASSESSMENT — PAIN SCALES - GENERAL: PAINLEVEL: NO PAIN (0)

## 2023-06-07 NOTE — PROGRESS NOTES
Concerns:    Doing well.  No concerns today.  No vaginal bleeding, LOF, contractions.  No HA, RUQ pain, N/V, visual changes.  Labor precautions discussed.  RTC 1 week.  May stop the low dose asa         Pregnancy risks              1. History of severe PreEc @ 37w on LDA              2. History of adhd, dep/anx, reading disorder              3. Father not involved              4. Rapid labors                      Prenatal care plan              - prior deliveries: , PreEc  -OB Labs of concern: none              - Rh- pos              - First trimester screening:  Discussed completed              - Second trimester screening:  Discussed completed              - Labor pain management:                - Ped:  RH              - Circumcision if boy: Yes              - BC:               - Breast feeding:  Yes              - Covid 19 vaccine: NO              - influenza vaccine: NO              - Tdap done    Jose Burton MD

## 2023-06-14 ENCOUNTER — PRENATAL OFFICE VISIT (OUTPATIENT)
Dept: FAMILY MEDICINE | Facility: CLINIC | Age: 23
End: 2023-06-14
Payer: COMMERCIAL

## 2023-06-14 VITALS
OXYGEN SATURATION: 98 % | DIASTOLIC BLOOD PRESSURE: 75 MMHG | WEIGHT: 222.8 LBS | SYSTOLIC BLOOD PRESSURE: 121 MMHG | HEART RATE: 80 BPM | BODY MASS INDEX: 33.77 KG/M2 | TEMPERATURE: 97.1 F | RESPIRATION RATE: 16 BRPM | HEIGHT: 68 IN

## 2023-06-14 DIAGNOSIS — O09.90 HIGH-RISK PREGNANCY SUPERVISION, UNSPECIFIED TRIMESTER: Primary | ICD-10-CM

## 2023-06-14 PROCEDURE — 99207 PR PRENATAL VISIT: CPT | Performed by: FAMILY MEDICINE

## 2023-06-14 ASSESSMENT — PAIN SCALES - GENERAL: PAINLEVEL: NO PAIN (0)

## 2023-06-14 NOTE — PROGRESS NOTES
"Pregnancy risks              1. History of severe PreEc @ 37w on LDA              2. History of adhd, dep/anx, reading disorder              3. Father not involved              4. Rapid labors                   Prenatal care plan              - prior deliveries: , PreEc  -OB Labs of concern: none              - Rh- pos              - First trimester screening:  Discussed completed              - Second trimester screening:  Discussed completed              - Labor pain management:                - Ped:  RH              - Circumcision if boy: Yes              - BC:               - Breast feeding:  Yes              - Covid 19 vaccine: NO              - influenza vaccine: NO              - Tdap done    Reviewed medical record.  Good prenatal care with no complication.  Strep is negative.    Overall, doing well.  No concerns today.   Normal fetal movement  Rare, sporadic, nonpainful contractions without increased pelvic pressure or cramping  No vaginal bleeding, abnormal discharge, or leakage.  No HA, abdominal pain, N/V, visual changes, or leg swelling     Prenatal flowsheet information is reviewed.      /75   Pulse 80   Temp 97.1  F (36.2  C) (Temporal)   Resp 16   Ht 1.725 m (5' 7.9\")   Wt 101.1 kg (222 lb 12.8 oz)   LMP 2022   SpO2 98%   BMI 33.98 kg/m          Cervix: offered but declined  Cephalic presentation appreciated on the exam today.  Continue with prenatal vitamin and encouraged to drink a lot of water  Discussed signs of labor and when to call or come in.   - history of rapid deliver - can go faster with this pregnancy  Intrapartum care discussed  Discussed about indication for induction or augmentation  Discussed about indication for episiotomy, vacuuming or    -Discussed about midline vs lateral episiotomy  Discussed about  care - ok with routine care and cord delay  Discussed kick counts and fetal movement.  Reportable signs and symptoms discussed.  Preeclamptic " symptoms discussed as well  Up-to-date for Tdap; group B strep negative  RTC 1 week with Dr. Burton as scheduled      Jose Crawford Mai, MD

## 2023-06-15 ENCOUNTER — HOSPITAL ENCOUNTER (OUTPATIENT)
Facility: CLINIC | Age: 23
Discharge: HOME | End: 2023-06-15
Attending: FAMILY MEDICINE | Admitting: FAMILY MEDICINE
Payer: COMMERCIAL

## 2023-06-15 VITALS
HEIGHT: 68 IN | BODY MASS INDEX: 33.65 KG/M2 | DIASTOLIC BLOOD PRESSURE: 77 MMHG | SYSTOLIC BLOOD PRESSURE: 129 MMHG | RESPIRATION RATE: 18 BRPM | HEART RATE: 80 BPM | TEMPERATURE: 97.7 F | WEIGHT: 222 LBS

## 2023-06-15 DIAGNOSIS — Z36.89 ENCOUNTER FOR TRIAGE IN PREGNANT PATIENT: Primary | ICD-10-CM

## 2023-06-15 LAB
ALBUMIN UR-MCNC: NEGATIVE MG/DL
APPEARANCE UR: CLEAR
BILIRUB UR QL STRIP: NEGATIVE
CLUE CELLS: PRESENT
COLOR UR AUTO: YELLOW
GLUCOSE UR STRIP-MCNC: NEGATIVE MG/DL
HGB UR QL STRIP: NEGATIVE
KETONES UR STRIP-MCNC: NEGATIVE MG/DL
LEUKOCYTE ESTERASE UR QL STRIP: ABNORMAL
NITRATE UR QL: NEGATIVE
PH UR STRIP: 7 [PH] (ref 5–7)
RBC URINE: 0 /HPF
RUPTURE OF FETAL MEMBRANES BY ROM PLUS: NEGATIVE
SP GR UR STRIP: 1.01 (ref 1–1.03)
SQUAMOUS EPITHELIAL: 5 /HPF
TRICHOMONAS, WET PREP: ABNORMAL
UROBILINOGEN UR STRIP-MCNC: NORMAL MG/DL
WBC URINE: 1 /HPF
WBC'S/HIGH POWER FIELD, WET PREP: ABNORMAL
YEAST, WET PREP: ABNORMAL

## 2023-06-15 PROCEDURE — 84112 EVAL AMNIOTIC FLUID PROTEIN: CPT | Performed by: FAMILY MEDICINE

## 2023-06-15 PROCEDURE — 87210 SMEAR WET MOUNT SALINE/INK: CPT | Performed by: FAMILY MEDICINE

## 2023-06-15 PROCEDURE — 250N000013 HC RX MED GY IP 250 OP 250 PS 637: Performed by: FAMILY MEDICINE

## 2023-06-15 PROCEDURE — G0463 HOSPITAL OUTPT CLINIC VISIT: HCPCS

## 2023-06-15 PROCEDURE — 81001 URINALYSIS AUTO W/SCOPE: CPT | Performed by: FAMILY MEDICINE

## 2023-06-15 RX ORDER — METRONIDAZOLE 500 MG/1
500 TABLET ORAL ONCE
Status: COMPLETED | OUTPATIENT
Start: 2023-06-15 | End: 2023-06-15

## 2023-06-15 RX ORDER — METRONIDAZOLE 500 MG/1
500 TABLET ORAL 2 TIMES DAILY
Qty: 14 TABLET | Refills: 0 | Status: SHIPPED | OUTPATIENT
Start: 2023-06-15 | End: 2023-06-22

## 2023-06-15 RX ORDER — LIDOCAINE 40 MG/G
CREAM TOPICAL
Status: DISCONTINUED | OUTPATIENT
Start: 2023-06-15 | End: 2023-06-16 | Stop reason: HOSPADM

## 2023-06-15 RX ADMIN — METRONIDAZOLE 500 MG: 500 TABLET ORAL at 21:39

## 2023-06-15 ASSESSMENT — ACTIVITIES OF DAILY LIVING (ADL)
WEAR_GLASSES_OR_BLIND: NO
DRESSING/BATHING_DIFFICULTY: NO
WALKING_OR_CLIMBING_STAIRS_DIFFICULTY: NO
FALL_HISTORY_WITHIN_LAST_SIX_MONTHS: NO
CONCENTRATING,_REMEMBERING_OR_MAKING_DECISIONS_DIFFICULTY: NO
CHANGE_IN_FUNCTIONAL_STATUS_SINCE_ONSET_OF_CURRENT_ILLNESS/INJURY: NO
DOING_ERRANDS_INDEPENDENTLY_DIFFICULTY: NO
TOILETING_ISSUES: NO
DIFFICULTY_EATING/SWALLOWING: NO
ADLS_ACUITY_SCORE: 18
DIFFICULTY_COMMUNICATING: NO
HEARING_DIFFICULTY_OR_DEAF: NO

## 2023-06-16 NOTE — PROGRESS NOTES
S:Patient presents due to  cramping and frequent voiding, unsure if leaking.  B:38w2d   Allergies: Patient has no known allergies.  A:A:moderate variablility, + accels, no decels, Category I  normal uterine activity  posterior, soft and effaced <30%    Dr. Dempsey called and orders received.  Plan includes; Monitor, observe and reevaluate. Reviewed with patient and she agrees with plan.        Prenatal Breastfeeding Education Toolkit provided for patient to review,helping her to make an informed decision on a feeding choice for her baby. Patient declined. Questions answered.    Oriented to room and call light.

## 2023-06-16 NOTE — PROGRESS NOTES
Called Dr. Dempsey re: pt arrival to triage with c/o cramping and feeling like she has to constantly urinate and this is how she felt when she gave birth to her first and her labor went very fast, ROM neg, wet prep + clue cells, 1+ WBC's. UA had small luekodytes. Rec'd order for discharge on Metronidazole 500mg BID x7 days, to give first dose now.

## 2023-06-16 NOTE — PROGRESS NOTES
S: Discharge from triage  A: A:moderate variablility, + accels, no decels, Category I  normal uterine activity  Strip reviewed by Claudette PEREIRA RN.  posterior and dilated to 2  Admission on 06/15/2023   Component Date Value     Trichomonas 06/15/2023 Absent      Yeast 06/15/2023 Absent      Clue Cells 06/15/2023 Present (A)      WBCs/high power field 06/15/2023 1+ (A)      Rupture of Fetal Membran* 06/15/2023 Negative      Color Urine 06/15/2023 Yellow      Appearance Urine 06/15/2023 Clear      Glucose Urine 06/15/2023 Negative      Bilirubin Urine 06/15/2023 Negative      Ketones Urine 06/15/2023 Negative      Specific Gravity Urine 06/15/2023 1.006      Blood Urine 06/15/2023 Negative      pH Urine 06/15/2023 7.0      Protein Albumin Urine 06/15/2023 Negative      Urobilinogen Urine 06/15/2023 Normal      Nitrite Urine 06/15/2023 Negative      Leukocyte Esterase Urine 06/15/2023 Small (A)      RBC Urine 06/15/2023 0      WBC Urine 06/15/2023 1      Squamous Epithelials Uri* 06/15/2023 5 (H)      Dr. Dempsey informed of above and discharge order received.   R: Plan includes: Labor instuctions given Fetal kick count instructions given. Discharge Medications: Flagyl. Patient instructed to report any recurrence of above concerns to her primary care provider during clinic hours or [facility] at any other time. Patient verbalized understanding of After Visit Summary, education and agreement with plan. Agrees to call for any problems, questions or concerns.  Discharged undelivered via ambulatory  in stable condition with all belongings. Accompanied by mom .

## 2023-06-16 NOTE — DISCHARGE INSTRUCTIONS
Discharge Instructions for Undelivered Patients      Diet:  * Drink 8 to 12 glasses of liquids (milk, juice, water) every day  * You may eat meals and snacks.    Activity:  * Count fetal kicks every day.  * Call your doctor or nurse midwife if your baby is moving less than usual.    Call your provider if you notice:  * Swelling in your face or increased swelling in your hands or legs.  * Headaches that are not relieved by Tylenol (acetaminophen).  * Changes in your vision (blurring; seeing spots or stars).  * Nausea (sick to your stomach) and vomiting (throwing up).  * Weight gain of 5 pounds per week.  * Heartburn that doesn't go away.  * Signs of bladder infection: Pain when you urinate (use the toilet), needing to go more often or more urgently.  * The bag of rasmussen (membrane) breaks, or you notice leaking in your underwear.  * Bright red blood in your underwear.  * Abdominal (lower belly) or stomach pain.  * Second (plus) baby: Contractions (tightenings) less than 10 minutes apart and getting stronger.  * Increase or change in vaginal discharge (note the color and amount).    Keep your next follow up appointment. Fill your Rx tomorrow. Call us anytime! [phone]

## 2023-06-18 ENCOUNTER — NURSE TRIAGE (OUTPATIENT)
Dept: NURSING | Facility: CLINIC | Age: 23
End: 2023-06-18
Payer: COMMERCIAL

## 2023-06-19 NOTE — TELEPHONE ENCOUNTER
OB Triage Call      Is patient's OB/Midwife with the [facility] or [facility]? [facility]- Proceed with triage     Reason for call: pt thinks she is in labor    Assessment: pt states that she has been having back pain today and that she feels as if her baby has dropped lower into her pelvis. She states she is having mild cramping about every 10 mins. These are not painful contractions, she states she did not have contractions with her last baby, that she delivered in 6 mins. erring on the side of caution and having her go in to get checked out    Plan: go to labor and delivery    Patient plans to deliver at [facility])    Patient's primary OB Provider is Dr. Burton.     Per protocol recommendations Patient to be evaluated in L&D. Patient's primary OB is Veronika Physician.  Labor and delivery at [facility]) ([phone]) notified of patient's pending arrival.  Report given to Charge Rn.      Is patient's delivering hospital on divert? No      38w5d    Estimated Date of Delivery: 2023        OB History    Para Term  AB Living   4 1 1 0 2 1   SAB IAB Ectopic Multiple Live Births   0 0 0 0 1      # Outcome Date GA Lbr Sunday/2nd Weight Sex Delivery Anes PTL Lv   4 Current            3 Term 21 38w3d / 00:06 3.745 kg (8 lb 4.1 oz) F Vag-Spont None N MAU      Name: Misty      Apgar1: 9  Apgar5: 9   2 AB  6w0d          1 AB  12w0d             Obstetric Comments   EDC 2023 based on LMP  Parenting with Bipin       Lab Results   Component Value Date    GBS Negative 2021          Gina Jurado, RN 23 8:33 PM  [facility] [place] Nurse Advisor    Reason for Disposition    [1] History of rapid prior delivery AND [2] contractions < 10 minutes apart    Additional Information    Negative: Passed out (i.e., lost consciousness, collapsed and was not responding)    Negative: Shock suspected (e.g., cold/pale/clammy skin, too weak to stand, low BP, rapid  "pulse)    Negative: Difficult to awaken or acting confused (e.g., disoriented, slurred speech)    Negative: [1] SEVERE abdominal pain (e.g., excruciating) AND [2] constant AND [3] present > 1 hour    Negative: Severe bleeding (e.g., continuous red blood from vagina, or large blood clots)    Negative: Umbilical cord hanging out of the vagina (shiny, white, curled appearance, \"like telephone cord\")    Negative: Uncontrollable urge to push (i.e., feels like baby is coming out now)    Negative: Can see baby    Negative: Sounds like a life-threatening emergency to the triager    Negative: [1] First baby (primipara) AND [2] contractions < 6 minutes apart  AND [3] present 2 hours    Negative: [1] History of prior delivery (multipara) AND [2] contractions < 10 minutes apart AND [3] present 1 hour    Protocols used: PREGNANCY - LABOR-A-    Gina Jurado RN on 6/18/2023 at 8:47 PM    "

## 2023-06-21 ENCOUNTER — MYC MEDICAL ADVICE (OUTPATIENT)
Dept: FAMILY MEDICINE | Facility: CLINIC | Age: 23
End: 2023-06-21
Payer: COMMERCIAL

## 2023-06-23 ENCOUNTER — PRENATAL OFFICE VISIT (OUTPATIENT)
Dept: FAMILY MEDICINE | Facility: CLINIC | Age: 23
End: 2023-06-23
Payer: COMMERCIAL

## 2023-06-23 VITALS
TEMPERATURE: 96.7 F | OXYGEN SATURATION: 97 % | RESPIRATION RATE: 20 BRPM | DIASTOLIC BLOOD PRESSURE: 60 MMHG | HEART RATE: 92 BPM | HEIGHT: 68 IN | SYSTOLIC BLOOD PRESSURE: 112 MMHG | BODY MASS INDEX: 34.22 KG/M2 | WEIGHT: 225.8 LBS

## 2023-06-23 DIAGNOSIS — O09.90 HIGH-RISK PREGNANCY SUPERVISION, UNSPECIFIED TRIMESTER: Primary | ICD-10-CM

## 2023-06-23 PROCEDURE — 99207 PR PRENATAL VISIT: CPT | Performed by: FAMILY MEDICINE

## 2023-06-23 RX ORDER — SODIUM CHLORIDE, SODIUM LACTATE, POTASSIUM CHLORIDE, CALCIUM CHLORIDE 600; 310; 30; 20 MG/100ML; MG/100ML; MG/100ML; MG/100ML
INJECTION, SOLUTION INTRAVENOUS CONTINUOUS PRN
Status: CANCELLED | OUTPATIENT
Start: 2023-06-23

## 2023-06-23 RX ORDER — NALOXONE HYDROCHLORIDE 0.4 MG/ML
0.4 INJECTION, SOLUTION INTRAMUSCULAR; INTRAVENOUS; SUBCUTANEOUS
Status: CANCELLED | OUTPATIENT
Start: 2023-06-23

## 2023-06-23 RX ORDER — PROCHLORPERAZINE MALEATE 5 MG
10 TABLET ORAL EVERY 6 HOURS PRN
Status: CANCELLED | OUTPATIENT
Start: 2023-06-23

## 2023-06-23 RX ORDER — OXYTOCIN/0.9 % SODIUM CHLORIDE 30/500 ML
100-340 PLASTIC BAG, INJECTION (ML) INTRAVENOUS CONTINUOUS PRN
Status: CANCELLED | OUTPATIENT
Start: 2023-06-23

## 2023-06-23 RX ORDER — METOCLOPRAMIDE 5 MG/1
10 TABLET ORAL EVERY 6 HOURS PRN
Status: CANCELLED | OUTPATIENT
Start: 2023-06-23

## 2023-06-23 RX ORDER — MISOPROSTOL 200 UG/1
400 TABLET ORAL
Status: CANCELLED | OUTPATIENT
Start: 2023-06-23

## 2023-06-23 RX ORDER — CARBOPROST TROMETHAMINE 250 UG/ML
250 INJECTION, SOLUTION INTRAMUSCULAR
Status: CANCELLED | OUTPATIENT
Start: 2023-06-23

## 2023-06-23 RX ORDER — NALOXONE HYDROCHLORIDE 0.4 MG/ML
0.2 INJECTION, SOLUTION INTRAMUSCULAR; INTRAVENOUS; SUBCUTANEOUS
Status: CANCELLED | OUTPATIENT
Start: 2023-06-23

## 2023-06-23 RX ORDER — ONDANSETRON 2 MG/ML
4 INJECTION INTRAMUSCULAR; INTRAVENOUS EVERY 6 HOURS PRN
Status: CANCELLED | OUTPATIENT
Start: 2023-06-23

## 2023-06-23 RX ORDER — OXYTOCIN 10 [USP'U]/ML
10 INJECTION, SOLUTION INTRAMUSCULAR; INTRAVENOUS
Status: CANCELLED | OUTPATIENT
Start: 2023-06-23

## 2023-06-23 RX ORDER — CITRIC ACID/SODIUM CITRATE 334-500MG
30 SOLUTION, ORAL ORAL
Status: CANCELLED | OUTPATIENT
Start: 2023-06-23

## 2023-06-23 RX ORDER — PROCHLORPERAZINE 25 MG
25 SUPPOSITORY, RECTAL RECTAL EVERY 12 HOURS PRN
Status: CANCELLED | OUTPATIENT
Start: 2023-06-23

## 2023-06-23 RX ORDER — METHYLERGONOVINE MALEATE 0.2 MG/ML
200 INJECTION INTRAVENOUS
Status: CANCELLED | OUTPATIENT
Start: 2023-06-23

## 2023-06-23 RX ORDER — IBUPROFEN 200 MG
800 TABLET ORAL
Status: CANCELLED | OUTPATIENT
Start: 2023-06-23 | End: 2023-06-28

## 2023-06-23 RX ORDER — KETOROLAC TROMETHAMINE 30 MG/ML
30 INJECTION, SOLUTION INTRAMUSCULAR; INTRAVENOUS
Status: CANCELLED | OUTPATIENT
Start: 2023-06-23 | End: 2023-06-28

## 2023-06-23 RX ORDER — TRANEXAMIC ACID 10 MG/ML
1 INJECTION, SOLUTION INTRAVENOUS EVERY 30 MIN PRN
Status: CANCELLED | OUTPATIENT
Start: 2023-06-23

## 2023-06-23 RX ORDER — OXYTOCIN/0.9 % SODIUM CHLORIDE 30/500 ML
340 PLASTIC BAG, INJECTION (ML) INTRAVENOUS CONTINUOUS PRN
Status: CANCELLED | OUTPATIENT
Start: 2023-06-23

## 2023-06-23 RX ORDER — OXYTOCIN/0.9 % SODIUM CHLORIDE 30/500 ML
1-24 PLASTIC BAG, INJECTION (ML) INTRAVENOUS CONTINUOUS
Status: CANCELLED | OUTPATIENT
Start: 2023-06-23

## 2023-06-23 RX ORDER — METOCLOPRAMIDE HYDROCHLORIDE 5 MG/ML
10 INJECTION INTRAMUSCULAR; INTRAVENOUS EVERY 6 HOURS PRN
Status: CANCELLED | OUTPATIENT
Start: 2023-06-23

## 2023-06-23 RX ORDER — LIDOCAINE 40 MG/G
CREAM TOPICAL
Status: CANCELLED | OUTPATIENT
Start: 2023-06-23

## 2023-06-23 RX ORDER — ONDANSETRON 4 MG/1
4 TABLET, ORALLY DISINTEGRATING ORAL EVERY 6 HOURS PRN
Status: CANCELLED | OUTPATIENT
Start: 2023-06-23

## 2023-06-23 ASSESSMENT — PAIN SCALES - GENERAL: PAINLEVEL: NO PAIN (0)

## 2023-06-23 NOTE — PROGRESS NOTES
Concerns:    Doing well.  No concerns today.  No vaginal bleeding, LOF, contractions.  No HA, RUQ pain, N/V, visual changes.  Labor precautions discussed.  RTC 1 week.  Discussed induction of labor, and she is considering and [facility]      Pregnancy risks              1. History of severe PreEc @ 37w on LDA              2. History of adhd, dep/anx, reading disorder              3. Father not involved              4. Rapid labors                      Prenatal care plan              - prior deliveries: , PreEc  -OB Labs of concern: none              - Rh- pos              - First trimester screening:  Discussed completed              - Second trimester screening:  Discussed completed              - Labor pain management:                - Ped:  RH              - Circumcision if boy: Yes              - BC:               - Breast feeding:  Yes              - Covid 19 vaccine: NO              - influenza vaccine: NO              - Tdap done  Jose Burton MD

## 2023-06-27 NOTE — TELEPHONE ENCOUNTER
Please help set patient up for induction on Thursday.    Karuna Stevenson RN on 6/27/2023 at 9:49 AM

## 2023-06-29 ENCOUNTER — ANESTHESIA (OUTPATIENT)
Dept: OBGYN | Facility: CLINIC | Age: 23
End: 2023-06-29
Payer: COMMERCIAL

## 2023-06-29 ENCOUNTER — HOSPITAL ENCOUNTER (INPATIENT)
Facility: CLINIC | Age: 23
LOS: 2 days | Discharge: HOME | End: 2023-07-01
Attending: FAMILY MEDICINE | Admitting: FAMILY MEDICINE
Payer: COMMERCIAL

## 2023-06-29 ENCOUNTER — ANESTHESIA EVENT (OUTPATIENT)
Dept: OBGYN | Facility: CLINIC | Age: 23
End: 2023-06-29
Payer: COMMERCIAL

## 2023-06-29 PROBLEM — Z37.9 NORMAL LABOR: Status: ACTIVE | Noted: 2023-06-29

## 2023-06-29 PROBLEM — Z36.89 ENCOUNTER FOR TRIAGE IN PREGNANT PATIENT: Status: RESOLVED | Noted: 2023-06-15 | Resolved: 2023-06-29

## 2023-06-29 PROBLEM — K85.10 GALLSTONE PANCREATITIS: Status: RESOLVED | Noted: 2021-02-27 | Resolved: 2023-06-29

## 2023-06-29 PROBLEM — Z37.9 NORMAL LABOR: Status: RESOLVED | Noted: 2023-06-29 | Resolved: 2023-06-29

## 2023-06-29 PROBLEM — O13.9 PREGNANCY INDUCED HYPERTENSION, ANTEPARTUM: Status: RESOLVED | Noted: 2021-02-11 | Resolved: 2023-06-29

## 2023-06-29 PROBLEM — K80.20 CHOLELITHIASIS: Status: RESOLVED | Noted: 2021-02-27 | Resolved: 2023-06-29

## 2023-06-29 PROBLEM — O09.90 HIGH-RISK PREGNANCY SUPERVISION, UNSPECIFIED TRIMESTER: Status: RESOLVED | Noted: 2020-12-09 | Resolved: 2023-06-29

## 2023-06-29 LAB
ABO/RH(D): NORMAL
ANTIBODY SCREEN: NEGATIVE
SPECIMEN EXPIRATION DATE: NORMAL

## 2023-06-29 PROCEDURE — 250N000009 HC RX 250

## 2023-06-29 PROCEDURE — 00HU33Z INSERTION OF INFUSION DEVICE INTO SPINAL CANAL, PERCUTANEOUS APPROACH: ICD-10-PCS | Performed by: NURSE ANESTHETIST, CERTIFIED REGISTERED

## 2023-06-29 PROCEDURE — 250N000009 HC RX 250: Performed by: NURSE ANESTHETIST, CERTIFIED REGISTERED

## 2023-06-29 PROCEDURE — 10907ZC DRAINAGE OF AMNIOTIC FLUID, THERAPEUTIC FROM PRODUCTS OF CONCEPTION, VIA NATURAL OR ARTIFICIAL OPENING: ICD-10-PCS | Performed by: FAMILY MEDICINE

## 2023-06-29 PROCEDURE — 250N000009 HC RX 250: Performed by: FAMILY MEDICINE

## 2023-06-29 PROCEDURE — 250N000011 HC RX IP 250 OP 636: Performed by: FAMILY MEDICINE

## 2023-06-29 PROCEDURE — 250N000011 HC RX IP 250 OP 636: Mod: JZ | Performed by: NURSE ANESTHETIST, CERTIFIED REGISTERED

## 2023-06-29 PROCEDURE — 3E0R3BZ INTRODUCTION OF ANESTHETIC AGENT INTO SPINAL CANAL, PERCUTANEOUS APPROACH: ICD-10-PCS | Performed by: NURSE ANESTHETIST, CERTIFIED REGISTERED

## 2023-06-29 PROCEDURE — 250N000011 HC RX IP 250 OP 636: Performed by: NURSE ANESTHETIST, CERTIFIED REGISTERED

## 2023-06-29 PROCEDURE — 59400 OBSTETRICAL CARE: CPT | Performed by: FAMILY MEDICINE

## 2023-06-29 PROCEDURE — 722N000001 HC LABOR CARE VAGINAL DELIVERY SINGLE

## 2023-06-29 PROCEDURE — 258N000003 HC RX IP 258 OP 636: Performed by: FAMILY MEDICINE

## 2023-06-29 PROCEDURE — 250N000013 HC RX MED GY IP 250 OP 250 PS 637: Performed by: FAMILY MEDICINE

## 2023-06-29 PROCEDURE — 86780 TREPONEMA PALLIDUM: CPT | Performed by: FAMILY MEDICINE

## 2023-06-29 PROCEDURE — 86850 RBC ANTIBODY SCREEN: CPT | Performed by: FAMILY MEDICINE

## 2023-06-29 PROCEDURE — 370N000003 HC ANESTHESIA WARD SERVICE: Performed by: NURSE ANESTHETIST, CERTIFIED REGISTERED

## 2023-06-29 PROCEDURE — 120N000001 HC R&B MED SURG/OB

## 2023-06-29 RX ORDER — MODIFIED LANOLIN
OINTMENT (GRAM) TOPICAL
Status: DISCONTINUED | OUTPATIENT
Start: 2023-06-29 | End: 2023-07-01 | Stop reason: HOSPADM

## 2023-06-29 RX ORDER — DOCUSATE SODIUM 100 MG/1
100 CAPSULE, LIQUID FILLED ORAL DAILY
Status: DISCONTINUED | OUTPATIENT
Start: 2023-06-30 | End: 2023-07-01 | Stop reason: HOSPADM

## 2023-06-29 RX ORDER — MISOPROSTOL 200 UG/1
400 TABLET ORAL
Status: DISCONTINUED | OUTPATIENT
Start: 2023-06-29 | End: 2023-06-29 | Stop reason: HOSPADM

## 2023-06-29 RX ORDER — ONDANSETRON 4 MG/1
4 TABLET, ORALLY DISINTEGRATING ORAL EVERY 6 HOURS PRN
Status: DISCONTINUED | OUTPATIENT
Start: 2023-06-29 | End: 2023-06-29 | Stop reason: HOSPADM

## 2023-06-29 RX ORDER — CITRIC ACID/SODIUM CITRATE 334-500MG
30 SOLUTION, ORAL ORAL
Status: DISCONTINUED | OUTPATIENT
Start: 2023-06-29 | End: 2023-06-29 | Stop reason: HOSPADM

## 2023-06-29 RX ORDER — ACETAMINOPHEN 325 MG/1
650 TABLET ORAL EVERY 4 HOURS PRN
Status: DISCONTINUED | OUTPATIENT
Start: 2023-06-29 | End: 2023-07-01 | Stop reason: HOSPADM

## 2023-06-29 RX ORDER — PROCHLORPERAZINE 25 MG
25 SUPPOSITORY, RECTAL RECTAL EVERY 12 HOURS PRN
Status: DISCONTINUED | OUTPATIENT
Start: 2023-06-29 | End: 2023-06-29 | Stop reason: HOSPADM

## 2023-06-29 RX ORDER — IBUPROFEN 800 MG/1
800 TABLET, FILM COATED ORAL EVERY 6 HOURS PRN
Status: DISCONTINUED | OUTPATIENT
Start: 2023-06-29 | End: 2023-07-01 | Stop reason: HOSPADM

## 2023-06-29 RX ORDER — METOCLOPRAMIDE 5 MG/1
10 TABLET ORAL EVERY 6 HOURS PRN
Status: DISCONTINUED | OUTPATIENT
Start: 2023-06-29 | End: 2023-06-29 | Stop reason: HOSPADM

## 2023-06-29 RX ORDER — METHYLERGONOVINE MALEATE 0.2 MG/ML
200 INJECTION INTRAVENOUS
Status: DISCONTINUED | OUTPATIENT
Start: 2023-06-29 | End: 2023-07-01 | Stop reason: HOSPADM

## 2023-06-29 RX ORDER — FENTANYL CITRATE-0.9 % NACL/PF 10 MCG/ML
100 PLASTIC BAG, INJECTION (ML) INTRAVENOUS EVERY 5 MIN PRN
Status: DISCONTINUED | OUTPATIENT
Start: 2023-06-29 | End: 2023-06-29 | Stop reason: HOSPADM

## 2023-06-29 RX ORDER — METOCLOPRAMIDE HYDROCHLORIDE 5 MG/ML
10 INJECTION INTRAMUSCULAR; INTRAVENOUS EVERY 6 HOURS PRN
Status: DISCONTINUED | OUTPATIENT
Start: 2023-06-29 | End: 2023-06-29 | Stop reason: HOSPADM

## 2023-06-29 RX ORDER — FENTANYL CITRATE 50 UG/ML
100 INJECTION, SOLUTION INTRAMUSCULAR; INTRAVENOUS
Status: DISCONTINUED | OUTPATIENT
Start: 2023-06-29 | End: 2023-07-01 | Stop reason: HOSPADM

## 2023-06-29 RX ORDER — PROCHLORPERAZINE MALEATE 5 MG
10 TABLET ORAL EVERY 6 HOURS PRN
Status: DISCONTINUED | OUTPATIENT
Start: 2023-06-29 | End: 2023-06-29 | Stop reason: HOSPADM

## 2023-06-29 RX ORDER — LIDOCAINE 40 MG/G
CREAM TOPICAL
Status: DISCONTINUED | OUTPATIENT
Start: 2023-06-29 | End: 2023-06-29 | Stop reason: HOSPADM

## 2023-06-29 RX ORDER — OXYTOCIN 10 [USP'U]/ML
10 INJECTION, SOLUTION INTRAMUSCULAR; INTRAVENOUS
Status: DISCONTINUED | OUTPATIENT
Start: 2023-06-29 | End: 2023-07-01 | Stop reason: HOSPADM

## 2023-06-29 RX ORDER — OXYTOCIN/0.9 % SODIUM CHLORIDE 30/500 ML
340 PLASTIC BAG, INJECTION (ML) INTRAVENOUS CONTINUOUS PRN
Status: DISCONTINUED | OUTPATIENT
Start: 2023-06-29 | End: 2023-07-01 | Stop reason: HOSPADM

## 2023-06-29 RX ORDER — MISOPROSTOL 200 UG/1
400 TABLET ORAL
Status: DISCONTINUED | OUTPATIENT
Start: 2023-06-29 | End: 2023-07-01 | Stop reason: HOSPADM

## 2023-06-29 RX ORDER — NALOXONE HYDROCHLORIDE 0.4 MG/ML
0.2 INJECTION, SOLUTION INTRAMUSCULAR; INTRAVENOUS; SUBCUTANEOUS
Status: DISCONTINUED | OUTPATIENT
Start: 2023-06-29 | End: 2023-06-29 | Stop reason: HOSPADM

## 2023-06-29 RX ORDER — BISACODYL 10 MG
10 SUPPOSITORY, RECTAL RECTAL DAILY PRN
Status: DISCONTINUED | OUTPATIENT
Start: 2023-06-29 | End: 2023-07-01 | Stop reason: HOSPADM

## 2023-06-29 RX ORDER — LIDOCAINE HYDROCHLORIDE 10 MG/ML
INJECTION, SOLUTION INFILTRATION; PERINEURAL
Status: COMPLETED
Start: 2023-06-29 | End: 2023-06-29

## 2023-06-29 RX ORDER — OXYTOCIN 10 [USP'U]/ML
10 INJECTION, SOLUTION INTRAMUSCULAR; INTRAVENOUS
Status: DISCONTINUED | OUTPATIENT
Start: 2023-06-29 | End: 2023-06-29 | Stop reason: HOSPADM

## 2023-06-29 RX ORDER — NALOXONE HYDROCHLORIDE 0.4 MG/ML
0.4 INJECTION, SOLUTION INTRAMUSCULAR; INTRAVENOUS; SUBCUTANEOUS
Status: DISCONTINUED | OUTPATIENT
Start: 2023-06-29 | End: 2023-06-29 | Stop reason: HOSPADM

## 2023-06-29 RX ORDER — ONDANSETRON 2 MG/ML
4 INJECTION INTRAMUSCULAR; INTRAVENOUS EVERY 6 HOURS PRN
Status: DISCONTINUED | OUTPATIENT
Start: 2023-06-29 | End: 2023-06-29 | Stop reason: HOSPADM

## 2023-06-29 RX ORDER — TRANEXAMIC ACID 10 MG/ML
1 INJECTION, SOLUTION INTRAVENOUS EVERY 30 MIN PRN
Status: DISCONTINUED | OUTPATIENT
Start: 2023-06-29 | End: 2023-06-29 | Stop reason: HOSPADM

## 2023-06-29 RX ORDER — NALBUPHINE HYDROCHLORIDE 10 MG/ML
2.5-5 INJECTION, SOLUTION INTRAMUSCULAR; INTRAVENOUS; SUBCUTANEOUS EVERY 6 HOURS PRN
Status: DISCONTINUED | OUTPATIENT
Start: 2023-06-29 | End: 2023-07-01 | Stop reason: HOSPADM

## 2023-06-29 RX ORDER — CARBOPROST TROMETHAMINE 250 UG/ML
250 INJECTION, SOLUTION INTRAMUSCULAR
Status: DISCONTINUED | OUTPATIENT
Start: 2023-06-29 | End: 2023-06-29 | Stop reason: HOSPADM

## 2023-06-29 RX ORDER — LIDOCAINE HYDROCHLORIDE AND EPINEPHRINE 15; 5 MG/ML; UG/ML
INJECTION, SOLUTION EPIDURAL PRN
Status: DISCONTINUED | OUTPATIENT
Start: 2023-06-29 | End: 2023-06-29

## 2023-06-29 RX ORDER — CARBOPROST TROMETHAMINE 250 UG/ML
250 INJECTION, SOLUTION INTRAMUSCULAR
Status: DISCONTINUED | OUTPATIENT
Start: 2023-06-29 | End: 2023-07-01 | Stop reason: HOSPADM

## 2023-06-29 RX ORDER — HYDROCORTISONE 25 MG/G
CREAM TOPICAL 3 TIMES DAILY PRN
Status: DISCONTINUED | OUTPATIENT
Start: 2023-06-29 | End: 2023-07-01 | Stop reason: HOSPADM

## 2023-06-29 RX ORDER — TRANEXAMIC ACID 10 MG/ML
1 INJECTION, SOLUTION INTRAVENOUS EVERY 30 MIN PRN
Status: DISCONTINUED | OUTPATIENT
Start: 2023-06-29 | End: 2023-07-01 | Stop reason: HOSPADM

## 2023-06-29 RX ORDER — BUPIVACAINE HYDROCHLORIDE 2.5 MG/ML
INJECTION, SOLUTION EPIDURAL; INFILTRATION; INTRACAUDAL PRN
Status: DISCONTINUED | OUTPATIENT
Start: 2023-06-29 | End: 2023-06-29

## 2023-06-29 RX ORDER — OXYTOCIN/0.9 % SODIUM CHLORIDE 30/500 ML
1-24 PLASTIC BAG, INJECTION (ML) INTRAVENOUS CONTINUOUS
Status: DISCONTINUED | OUTPATIENT
Start: 2023-06-29 | End: 2023-06-29 | Stop reason: HOSPADM

## 2023-06-29 RX ORDER — OXYTOCIN/0.9 % SODIUM CHLORIDE 30/500 ML
340 PLASTIC BAG, INJECTION (ML) INTRAVENOUS CONTINUOUS PRN
Status: DISCONTINUED | OUTPATIENT
Start: 2023-06-29 | End: 2023-06-29 | Stop reason: HOSPADM

## 2023-06-29 RX ORDER — SODIUM CHLORIDE, SODIUM LACTATE, POTASSIUM CHLORIDE, CALCIUM CHLORIDE 600; 310; 30; 20 MG/100ML; MG/100ML; MG/100ML; MG/100ML
INJECTION, SOLUTION INTRAVENOUS CONTINUOUS PRN
Status: DISCONTINUED | OUTPATIENT
Start: 2023-06-29 | End: 2023-06-29 | Stop reason: HOSPADM

## 2023-06-29 RX ORDER — METHYLERGONOVINE MALEATE 0.2 MG/ML
200 INJECTION INTRAVENOUS
Status: DISCONTINUED | OUTPATIENT
Start: 2023-06-29 | End: 2023-06-29 | Stop reason: HOSPADM

## 2023-06-29 RX ADMIN — IBUPROFEN 800 MG: 800 TABLET ORAL at 22:43

## 2023-06-29 RX ADMIN — LIDOCAINE HYDROCHLORIDE: 10 INJECTION, SOLUTION INFILTRATION; PERINEURAL at 21:02

## 2023-06-29 RX ADMIN — FENTANYL CITRATE 50 MCG: 50 INJECTION, SOLUTION INTRAMUSCULAR; INTRAVENOUS at 17:08

## 2023-06-29 RX ADMIN — SODIUM CHLORIDE, POTASSIUM CHLORIDE, SODIUM LACTATE AND CALCIUM CHLORIDE: 600; 310; 30; 20 INJECTION, SOLUTION INTRAVENOUS at 08:18

## 2023-06-29 RX ADMIN — Medication 2 MILLI-UNITS/MIN: at 08:55

## 2023-06-29 RX ADMIN — Medication: at 17:37

## 2023-06-29 RX ADMIN — LIDOCAINE HYDROCHLORIDE,EPINEPHRINE BITARTRATE 3 ML: 15; .005 INJECTION, SOLUTION EPIDURAL; INFILTRATION; INTRACAUDAL; PERINEURAL at 17:28

## 2023-06-29 RX ADMIN — FENTANYL CITRATE 100 MCG: 50 INJECTION, SOLUTION INTRAMUSCULAR; INTRAVENOUS at 15:29

## 2023-06-29 RX ADMIN — LIDOCAINE HYDROCHLORIDE,EPINEPHRINE BITARTRATE 2 ML: 15; .005 INJECTION, SOLUTION EPIDURAL; INFILTRATION; INTRACAUDAL; PERINEURAL at 17:33

## 2023-06-29 RX ADMIN — BUPIVACAINE HYDROCHLORIDE 10 ML: 2.5 INJECTION, SOLUTION EPIDURAL; INFILTRATION; INTRACAUDAL at 17:33

## 2023-06-29 RX ADMIN — SODIUM CHLORIDE, POTASSIUM CHLORIDE, SODIUM LACTATE AND CALCIUM CHLORIDE: 600; 310; 30; 20 INJECTION, SOLUTION INTRAVENOUS at 20:28

## 2023-06-29 RX ADMIN — FENTANYL CITRATE 50 MCG: 50 INJECTION, SOLUTION INTRAMUSCULAR; INTRAVENOUS at 17:22

## 2023-06-29 RX ADMIN — SODIUM CHLORIDE, POTASSIUM CHLORIDE, SODIUM LACTATE AND CALCIUM CHLORIDE: 600; 310; 30; 20 INJECTION, SOLUTION INTRAVENOUS at 16:35

## 2023-06-29 ASSESSMENT — ACTIVITIES OF DAILY LIVING (ADL)
ADLS_ACUITY_SCORE: 18

## 2023-06-29 NOTE — PROGRESS NOTES
Dr. Burton called at 0840 regarding admission and inquired how client was doing; reported Category 1 strip and mother on birthing ball and currently on 4 mu/hr pitocin. Informed Dr. Burton that her cervix was not checked prior to start of pitocin; client reported 3 cm in clinic last Fri per Dr. Burton's exam. Will stop up at bedside shortly.

## 2023-06-29 NOTE — PROGRESS NOTES
Dr. Burton passed cares to Dr. Devries, however, Dr. Cannon also to be available as needed for a fast delivery. Dr. Devries 26 minutes out at will call back in about an hour for an update. Dr. Burton returned call at 6500 to confirm doctor delivery plan was in place.

## 2023-06-29 NOTE — ANESTHESIA PREPROCEDURE EVALUATION
Anesthesia Pre-Procedure Evaluation    Patient: Jyoti New   MRN: [id number] : 2000        Procedure : * No procedures listed *          Past Medical History:   Diagnosis Date     Cholelithiasis 2021      Past Surgical History:   Procedure Laterality Date     LAPAROSCOPIC CHOLECYSTECTOMY N/A 2021    Procedure: CHOLECYSTECTOMY, LAPAROSCOPIC;  Surgeon: Scott Hearn DO;  Location: [facility] OR      No Known Allergies   Social History     Tobacco Use     Smoking status: Former     Packs/day: 0.25     Types: Cigarettes     Quit date: 2022     Years since quittin.8     Smokeless tobacco: Never   Substance Use Topics     Alcohol use: Not Currently     Comment: occ      Wt Readings from Last 1 Encounters:   23 102.4 kg (225 lb 12.8 oz)        Anesthesia Evaluation        No history of anesthetic complications       ROS/MED HX  ENT/Pulmonary:  - neg pulmonary ROS     Neurologic:  - neg neurologic ROS     Cardiovascular:  - neg cardiovascular ROS     METS/Exercise Tolerance:     Hematologic:  - neg hematologic  ROS     Musculoskeletal:       GI/Hepatic:  - neg GI/hepatic ROS     Renal/Genitourinary:       Endo:  - neg endo ROS     Psychiatric/Substance Use:  - neg psychiatric ROS     Infectious Disease:       Malignancy:       Other:            Physical Exam    Airway        Mallampati: II   TM distance: > 3 FB   Neck ROM: full   Mouth opening: > 3 cm    Respiratory Devices and Support         Dental  no notable dental history         Cardiovascular   cardiovascular exam normal          Pulmonary   pulmonary exam normal                OUTSIDE LABS:  CBC:   Lab Results   Component Value Date    WBC 10.2 2022    WBC 5.0 2021    HGB 12.3 2023    HGB 13.6 2022    HCT 38.2 2022    HCT 31.9 (L) 2021     2022     2021     BMP:   Lab Results   Component Value Date     2021     2021    POTASSIUM 3.8  03/01/2021    POTASSIUM 3.6 02/28/2021    CHLORIDE 112 (H) 03/01/2021    CHLORIDE 112 (H) 02/28/2021    CO2 25 03/01/2021    CO2 26 02/28/2021    BUN 13 03/01/2021    BUN 14 02/28/2021    CR 0.65 03/01/2021    CR 0.66 02/28/2021    GLC 87 03/01/2021    GLC 91 02/28/2021     COAGS:   Lab Results   Component Value Date    PTT 28 02/27/2021    INR 1.06 03/01/2021    FIBR 454 (H) 02/08/2021     POC:   Lab Results   Component Value Date    HCG Negative 02/18/2022     HEPATIC:   Lab Results   Component Value Date    ALBUMIN 2.7 (L) 03/01/2021    PROTTOTAL 5.9 (L) 03/01/2021     (H) 03/01/2021    AST 57 (H) 03/01/2021    ALKPHOS 195 (H) 03/01/2021    BILITOTAL 0.4 03/01/2021     OTHER:   Lab Results   Component Value Date    LACT 0.6 (L) 02/27/2021    RENE 8.3 (L) 03/01/2021    LIPASE 554 (H) 03/01/2021    CRP <2.9 02/27/2021       Anesthesia Plan    ASA Status:  2      Anesthesia Type: Epidural.              Consents    Anesthesia Plan(s) and associated risks, benefits, and realistic alternatives discussed. Questions answered and patient/representative(s) expressed understanding.    - Discussed:     - Discussed with:  Patient         Postoperative Care            Comments:    Other Comments: 2nd baby.  Her first delivery was quick and no epidural wanted by pt.  She wished to go without an epidural for this labor as well but is stuck around 5 cm and baby is OP.  Reviewed risks/ benefits.  Will proceed       neg OB ROS.       JOYCE Roth CRNA

## 2023-06-29 NOTE — PROGRESS NOTES
SVE  After epidural set up, SE at 1800; 7cm, 90% 0 station. SVE at 1830; complete +1 station. Dr. Cannon on unit at 1844; Dr. Devries expected soon.

## 2023-06-29 NOTE — PROGRESS NOTES
S: Patient desires Epidural  B: Patient is a  who She is now 5 dialated, FHT's 115  A: Dixon PEREIRA CRNA called and in room at 1658. Patient and procedure correctly identified/verified with CRNA. Consent signed. 700ml fluid bolus given. Patient in position for epidural placement. Epidural placed without complications. Test dose/bolus given at 1728.1733 by CRNA and patient tolerated well. Epidural pump started at 1737.Patient rates her pain after procedure as 0/10.  R: Will continue to monitor.

## 2023-06-29 NOTE — PROGRESS NOTES
Dr. Burton here to perform AROM at 1250, fluid color clear and moderate amount noted. Cervix is now 4 cm %05,posterior,-2. Patient repositioned to throne position and FHR stable after procedure. Will continue to observe for progress and recheck cervix as needed. Dr. Burton informed client Dr. Devries would be covering for him from [id number] this evening and she was understanding of the plan.

## 2023-06-29 NOTE — PROGRESS NOTES
S:  Admission; late entry  B:  Jyoti is a  @ 40w2d gestation, GBS neg, Hep. B neg,uncomplicated pregnancy . EFW 7.5 lbs per MD  A:  Patient admitted to room 331 for pitocin induction. Sister, Linsey present for her support person. SO, Bipin, may not be involved.   R: Dr. Burton following for delivery.

## 2023-06-29 NOTE — PROGRESS NOTES
S: Induction of labor  B: Patient is a  who presents for scheduled induction of labor @ 40w2d gestation. EFW 7.5 #  A: IV Oxytocin per OB protocol at 2 mu/hrbeginning at 2 mU/hr at 0855. Patient has contractions every 3-5 minutes; not feeling. Lasting 40-60 sec. Pt rates her pain at a 0/10. Understands that she will be on continuous EFM throughout induction. FHR baseline is 125 with moderate variability. Patient agrees with plan of care.   R: Will observe for active phase of labor and fetal tolerance. Dr. Burton in clinic and available as needed.

## 2023-06-29 NOTE — ANESTHESIA PROCEDURE NOTES
"Epidural catheter Procedure Note    Pre-Procedure   Staff -        CRNA: James Dodge APRN CRNA       Performed By: CRNA       Location: OB       Pre-Anesthestic Checklist: patient identified, IV checked, risks and benefits discussed, informed consent, monitors and equipment checked, pre-op evaluation and at physician/surgeon's request  Timeout:       Correct Patient: Yes        Correct Procedure: Yes        Correct Site: Yes        Correct Position: Yes   Procedure Documentation  Procedure: epidural catheter       Patient Position: sitting       Patient Prep/Sterile Barriers: sterile gloves, mask, patient draped       Skin prep: Betadine       Local skin infiltrated with 3 mL of 1% lidocaine.        Insertion Site: L3-4. (midline approach).       Technique: LORT air        Needle Type: Touhy needle and Cameron       Needle Gauge: 18.        Needle Length (Inches): 3.5        Catheter: 20 G.          Catheter threaded easily.         4 cm epidural space.           # of attempts: 1 and  # of redirects:  0    Assessment/Narrative         Paresthesias: No.       Test dose of 3 mL lidocaine 1.5% w/ 1:200,000 epinephrine at.         Test dose negative, 3 minutes after injection, for signs of intravascular, subdural, or intrathecal injection.       Insertion/Infusion Method: LORT air       Aspiration negative for Heme or CSF via Epidural Catheter.       Sensory Level Left: T7.       Sensory Level Right: T7.     Comments:  Pt tolerated the procedure well.  No complications.  Vigo BRIAN with one redirect slightly cephalad but still midline.  Pt's epidural space was deeper than expected with the entire Touhy buried to the skin when BRIAN obtained.  Pain improved in a predictable manner.  VSS.  I am on call until 0600 and will be available if needed.      FOR [facility] ([facility] [place]) ONLY:   Pain Team Contact information: please page the Pain Team Via [employer]. Search \"Pain\". During daytime hours, please page the attending first. " At night please page the resident first.

## 2023-06-30 LAB
HGB BLD-MCNC: 10.8 G/DL (ref 11.7–15.7)
T PALLIDUM AB SER QL: NONREACTIVE

## 2023-06-30 PROCEDURE — 36415 COLL VENOUS BLD VENIPUNCTURE: CPT | Performed by: FAMILY MEDICINE

## 2023-06-30 PROCEDURE — 120N000001 HC R&B MED SURG/OB

## 2023-06-30 PROCEDURE — 250N000013 HC RX MED GY IP 250 OP 250 PS 637: Performed by: FAMILY MEDICINE

## 2023-06-30 PROCEDURE — 85018 HEMOGLOBIN: CPT | Performed by: FAMILY MEDICINE

## 2023-06-30 RX ADMIN — IBUPROFEN 800 MG: 800 TABLET ORAL at 10:37

## 2023-06-30 RX ADMIN — BENZOCAINE AND LEVOMENTHOL: 200; 5 SPRAY TOPICAL at 06:01

## 2023-06-30 RX ADMIN — IBUPROFEN 800 MG: 800 TABLET ORAL at 06:01

## 2023-06-30 RX ADMIN — IBUPROFEN 800 MG: 800 TABLET ORAL at 17:46

## 2023-06-30 RX ADMIN — ACETAMINOPHEN 650 MG: 325 TABLET, FILM COATED ORAL at 08:27

## 2023-06-30 RX ADMIN — ACETAMINOPHEN 650 MG: 325 TABLET, FILM COATED ORAL at 13:55

## 2023-06-30 ASSESSMENT — ACTIVITIES OF DAILY LIVING (ADL)
ADLS_ACUITY_SCORE: 19
ADLS_ACUITY_SCORE: 19
ADLS_ACUITY_SCORE: 18
ADLS_ACUITY_SCORE: 18
ADLS_ACUITY_SCORE: 19
ADLS_ACUITY_SCORE: 18
ADLS_ACUITY_SCORE: 22
ADLS_ACUITY_SCORE: 19
ADLS_ACUITY_SCORE: 22
ADLS_ACUITY_SCORE: 19

## 2023-06-30 NOTE — L&D DELIVERY NOTE
OB Vaginal Delivery Note    Jyoti New MRN# [id number]   Age: 22 year old YOB: 2000       GA: 40w2d  GP:   Labor Complications:    EBL:   mL  Delivery QBL:    Delivery Type: Vaginal, Spontaneous   ROM to Delivery Time: (Delivered) Hours: 8 Minutes: 1  [place] Weight:     1 Minute 5 Minute 10 Minute   Apgar Totals: 9   9        ALYCEZULMA;HAYDEN ORANTES     Delivery Details:  Jyoti New, a 22 year old  female delivered a viable infant with apgars of 9  and 9 . Patient was fully dilated and pushing after 0  hours 30  minutes in active labor. Delivery was via vaginal, spontaneous  to a sterile field under intravenous ;epidural  anesthesia. Infant delivered in vertex    occiput  posterior  position. Anterior and posterior shoulders delivered without difficulty. The cord was clamped, cut twice and 3 vessels  were noted. Cord blood was obtained in routine fashion with the following disposition:  .      Cord complications: none   Placenta delivered at 2023  8:56 PM . Placental disposition was Hospital disposal . Fundal massage performed and fundus found to be firm.     Episiotomy: none    Perineum, vagina, cervix were inspected, and the following lacerations were noted:   Perineal lacerations:    periurethral laceration: bilateral                Excellent hemostasis was noted. Needle count correct. Infant and patient in delivery room in good and stable condition.        Marianne New-Jyoti [[id number]]    Labor Event Times    Active labor onset date: 23 Onset time:  6:00 PM CDT   Dilation complete date: 23 Complete time:  6:30 PM   Start pushing date/time: 2023      Labor Length    1st Stage (hrs): 0 (min): 30   2nd Stage (hrs): 2 (min): 21   3rd Stage (hrs): 0 (min): 5      Labor Events     labor?: No   steroids: None  Labor Type: Induction/Cervical ripening, AROM  Predominate monitoring during 1st stage: continuous electronic  fetal monitoring     Antibiotics received during labor?: No     Rupture identifier: Sac 1  Rupture date/time: 6/29/23 1250   Rupture type: Artificial Rupture of Membranes  Fluid color: Clear  Fluid odor: Normal     Induction: Oxytocin  Induction date/time: 6/29/23 0855    Cervical ripening date/time:      Indications for induction: Elective     Augmentation: Oxytocin     Delivery/Placenta Date and Time    Delivery Date: 6/29/23 Delivery Time:  8:51 PM   Placenta Date/Time: 6/29/2023  8:56 PM  Oxytocin given at the time of delivery: after delivery of placenta  Delivering clinician: Jose Burton MD   Other personnel present at delivery:  Provider Role   Claudette Shea RN Delivery Nurse   Diana Moreira RN Registered Nurse         Vaginal Counts     Initial count performed by 2 team members:  Two Team Members   beronica shea       Needles Suture Needles Sponges (RETIRED) Instruments   Initial counts  2 5    Added to count       Relief counts       Final counts             Placed during labor Accounted for at the end of labor   FSE NA NA   IUPC NA NA   Cervidil NA NA                     Apgars    Living status: Living   1 Minute 5 Minute 10 Minute 15 Minute 20 Minute   Skin color: 1  1       Heart rate: 2  2       Reflex irritability: 2  2       Muscle tone: 2  2       Respiratory effort: 2  2       Total: 9  9       Apgars assigned by: JULIA MOREIRA RN     Cord    Vessels: 3 Vessels    Cord Complications: None               Gases Sent?: No    Delayed cord clamping?: Yes    Cord Clamping Delay (seconds): 31-60 seconds       Labor Events and Shoulder Dystocia    Fetal Tracing Prior to Delivery: Category 1     Delivery (Maternal) (Provider to Complete) ([id number])    Episiotomy: None  Periurethral laceration: bilateral Repaired?: No      Blood Loss  Mother: Jyoti New #[id number]   Start of Mother's Information    Delivery Blood Loss  06/29/23 1800 - 06/29/23 2136    None           End of  Mother's Information  Mother: Jyoti New #[id number]          Delivery - Provider to Complete ([id number])    Delivering clinician: Jose Burton MD  Delivery Type (Choose the 1 that will go to the Birth History): Vaginal, Spontaneous                   Other personnel:  Provider Role   Claudette Ortiz, RN Delivery Nurse   Diana Moreira, RN Registered Nurse                Placenta    Date/Time: 6/29/2023  8:56 PM  Removal: Spontaneous  Disposition: Hospital disposal           Anesthesia    Method: INTRAVENOUS , Epidural  Cervical dilation at placement: 4-7                Presentation and Position    Presentation: Vertex     Occiput Posterior                 Jose Burton MD

## 2023-06-30 NOTE — PROGRESS NOTES
Dr. Cannon remains on unit. Client laboring down comfortably. Anticipating Dr. Devries's arrival shortly.

## 2023-06-30 NOTE — PROGRESS NOTES
[facility]    Obstetrics Daily Post-Op Note    Assessment & Plan      -* No surgery found *  Active Problems:     (normal spontaneous vaginal delivery)       Assessment:  40w2d now      Plan:  Postpartum:  - Breast feeding yes  -    Lab Results   Component Value Date    RH Pos 2021    Rhogam Not indicated  - Immunizations: No results found for: RUBELLAABIGG  S/p TDaP and flu vaccines  - PPBC: unsure    Post-Op:  - Pain: Tylenol, Motrin. Opiates no  - Rodriguez removed, voiding  - Encourage ambulation    Dispo: 0-1d        Active Problems:     (normal spontaneous vaginal delivery)      Jose Burton MD    Subjective:  Interval History   Stable.  Doing well.  Improving slowly.  Pain is reasonably controlled.  No fevers. Lochia as expected for this stage.      Exam:  Physical Exam   Vitals:    23 2306 23 2311 23 2359 23 0350   BP:  122/66 134/66 122/60   BP Location:   Left arm Left arm   Patient Position:       Cuff Size:   Adult Regular Adult Regular   Pulse:   84 78   Resp:   17 16   Temp:   98.1  F (36.7  C) 97.6  F (36.4  C)   TempSrc:   Oral Oral   SpO2: 96% 95% 96% 93%       Constitutional: healthy, alert and no distress  Abdomen:  Uterine fundus is firm, expected tenderness at the level of the umbilicus, incision:   Extremeties:  No edema, pulses intact, scd's in place      Medications     - MEDICATION INSTRUCTIONS -       - MEDICATION INSTRUCTIONS -       oxytocin in 0.9% NaCl Stopped (234)        docusate sodium  100 mg Oral Daily       Data   Hemoglobin   Date Value Ref Range Status   2023 10.8 (L) 11.7 - 15.7 g/dL Final   2023 12.3 11.7 - 15.7 g/dL Final   2021 10.1 (L) 11.7 - 15.7 g/dL Final   2021 9.8 (L) 11.7 - 15.7 g/dL Final       Lab Results   Component Value Date    ABO A 2021           Lab Results   Component Value Date    GLC 87 2021    GLC 91 2021     2021          Jose Burton MD  Cell - [phone]

## 2023-06-30 NOTE — PROGRESS NOTES
S: Vaginal Delivery  B: Spontaneous vaginal delivery @ 2051. Bilateral periurethral lacs not requiring repair. Repair to right labial hole.  A: Baby delivered, cord clamping delayed for 60-90 seconds, then brought to pre- warmed infant warmer. Infant stimulated and dried. Infant then brought to mother and placed skin to skin for bonding. Apgars 9/9. Educated mother on expected feeding readiness cues and encouraged her to observe for feeding cues. Mother informed that breast feeding assistance would be provided.   R: Mother and baby bonding well. Anticipate first feed within the hour.

## 2023-06-30 NOTE — PROGRESS NOTES
Dr. Devries called at 1928 since not on site for delivery. This nurse understood he was on his way for delivery as he had said he was. While  speaking to him at 1830, Dr. Devries also requested Dr. Cannon be called in case client would deliver prior to his arrival. At this time, Dr. Cannon remains standby and unit is still awaiting Dr. Devries for delivery. Dr. Burton called at 1930 and reports he is still not available for delivery. Client continues to labor down comfortably.

## 2023-06-30 NOTE — PROGRESS NOTES
S: Transfer to postpartum  B: Vaginal birth @ , bilateral periurethral tears,  Repair of right labial hole, breast feeding    A: Mother transferred to postpartum unit at 2350 via jesse steady and baby in basinette after completion of immediate recovery period. Patient oriented to room. Mother and baby bonding well and in satisfactory condition upon transfer.  R: Anticipate routine postpartum care.

## 2023-06-30 NOTE — PLAN OF CARE
Goal Outcome Evaluation:    Tolerating regular diet. Ambulated to BR this am with stand by assist. Voiding without difficulty. Earline care performed with minimal assist. Fundus firm, midline at umbilicus, bleeding light. VSS. Bonding with infant. Breastfeeding with minimal assist latching required.

## 2023-06-30 NOTE — PLAN OF CARE
Goal Outcome Evaluation:       S: Shift note  B: First day post . .  A: Breastfeeding well. Bonding well with baby. VSS. Moderate flow, occasional small clots. Pain controlled with Ibu and Tylenol.  R: Anticipate discharge tomorrow.

## 2023-06-30 NOTE — PROGRESS NOTES
LABOR PROGRESS NOTE    Subjective: Jyoti is comfortable-epidural working well.     Oxytocin at 9 mu/min.    Objective:  /74   Temp 98.5  F (36.9  C) (Oral)   Resp 18   LMP 2022   SpO2 94%                     Fetal monitoring is category 1     Ctx q  3-5 min.  Baseline 140 variability is moderate                      Cervix is complete, +3 station-= she is pushing.    Assessment/Plan: 1.  labor is progressing    I anticipate       2.  Ctx are spacing- will increase oxytocin to 10 mu/min.  I drained 400 ml urine from bladder. That should help bring the baby down further.    LINDA Cannon MD , FACOG

## 2023-07-01 VITALS
HEART RATE: 63 BPM | DIASTOLIC BLOOD PRESSURE: 41 MMHG | OXYGEN SATURATION: 93 % | SYSTOLIC BLOOD PRESSURE: 99 MMHG | RESPIRATION RATE: 16 BRPM | TEMPERATURE: 98.2 F

## 2023-07-01 PROCEDURE — 250N000013 HC RX MED GY IP 250 OP 250 PS 637: Performed by: FAMILY MEDICINE

## 2023-07-01 RX ORDER — IBUPROFEN 800 MG/1
800 TABLET, FILM COATED ORAL EVERY 6 HOURS PRN
Qty: 60 TABLET | Refills: 0 | Status: SHIPPED | OUTPATIENT
Start: 2023-07-01

## 2023-07-01 RX ADMIN — DOCUSATE SODIUM 100 MG: 100 CAPSULE, LIQUID FILLED ORAL at 07:45

## 2023-07-01 RX ADMIN — IBUPROFEN 800 MG: 800 TABLET ORAL at 01:11

## 2023-07-01 RX ADMIN — IBUPROFEN 800 MG: 800 TABLET ORAL at 07:41

## 2023-07-01 RX ADMIN — IBUPROFEN 800 MG: 800 TABLET ORAL at 13:06

## 2023-07-01 ASSESSMENT — ACTIVITIES OF DAILY LIVING (ADL)
ADLS_ACUITY_SCORE: 18

## 2023-07-01 NOTE — DISCHARGE SUMMARY
[facility] Discharge Summary    Jyoti New MRN# [id number]   Age: 22 year old YOB: 2000     Date of Admission:  2023  Date of Discharge::  2023  Admitting Physician:  Jose Burton MD  Discharge Physician:  Dick Palacios MD, MD     Home clinic: [facility]          Admission Diagnoses:   Normal labor [O80, Z37.9]          Discharge Diagnosis:   Normal spontaneous vaginal delivery  Intrauterine pregnancy at 40 weeks gestation Elective           Procedures:   Procedure(s): No additional procedures performed       No other procedures performed during this admission           Medications Prior to Admission:     No medications prior to admission.             Discharge Medications:     Current Discharge Medication List      START taking these medications    Details   ibuprofen (ADVIL/MOTRIN) 800 MG tablet Take 1 tablet (800 mg) by mouth every 6 hours as needed for other (cramping)  Qty: 60 tablet, Refills: 0    Associated Diagnoses:  (normal spontaneous vaginal delivery)                   Consultations:   No consultations were requested during this admission          Brief History of Labor:   Delivery Details:  Jyoti New, a 22 year old  female delivered a viable infant with apgars of 9  and 9 . Patient was fully dilated and pushing after 0  hours 30  minutes in active labor. Delivery was via vaginal, spontaneous  to a sterile field under intravenous ;epidural  anesthesia. Infant delivered in vertex    occiput  posterior  position. Anterior and posterior shoulders delivered without difficulty. The cord was clamped, cut twice and 3 vessels  were noted. Cord blood was obtained in routine fashion with the following disposition:  .       Cord complications: none   Placenta delivered at 2023  8:56 PM . Placental disposition was Hospital disposal . Fundal massage performed and fundus found to be firm.      Episiotomy: none     Perineum, vagina, cervix were inspected, and the following lacerations were noted:   Perineal lacerations:    periurethral laceration: bilateral                 Excellent hemostasis was noted. Needle count correct. Infant and patient in delivery room in good and stable condition.            Hospital Course:   The patient's hospital course was unremarkable.  On discharge, her pain was well controlled. Vaginal bleeding is similar to peak menstrual flow.  Voiding without difficulty.  Ambulating well and tolerating a normal diet.  No fever.  Breastfeeding well.  Infant is stable.  No bowel movement yet.*  She was discharged on post-partum day #2    Post-partum hemoglobin:   Hemoglobin   Date Value Ref Range Status   06/30/2023 10.8 (L) 11.7 - 15.7 g/dL Final   03/01/2021 10.1 (L) 11.7 - 15.7 g/dL Final             Discharge Instructions and Follow-Up:   Discharge diet: Regular   Discharge activity: Lifting restricted to 10  pounds  No lifting, driving, or strenuous exercise for 2 week(s)   Discharge follow-up: Follow up with primary care provider in 6 weeks   Wound care: Drink plenty of fluids           Discharge Disposition:   Discharged to home      Attestation:      Dick Palacios MD, MD

## 2023-07-01 NOTE — DISCHARGE INSTRUCTIONS
[facility] Discharge Instructions     Discharge disposition:  Discharged to home       Diet:  Regular       Activity Lifting restricted to 10 pounds  No lifting, driving, or strenuous exercise for 2 week(s)       Follow-up: Follow up with primary care provider in 6 weeks       Additional instructions: None      Additional Patient Information:  Please drink plenty of liquids 7- 8 glasses a day, rest when needed, take your pain medications and ibuprofen if ordered, please call me if you have any questions or concerns  at the clinic at [phone].  Please enjoy your time with your new baby, Dr. Burton

## 2023-07-01 NOTE — PROGRESS NOTES
S: Discharge  to home with baby.     B: Patient had a Vaginal delivery with no complications. Baby girl Baby's name Mildred Schilling, breast: . Support person's name mom - Yuly.     A: VSS, bonding well with baby. Breastfeeding with minimal assistance. Using shield for comfort. Pain controlled with ibuprofen.    R:  Discharge instructions reviewed and questions answered.  Belongings gathered and returned to the patient. Agreed to follow up in 6 weeks or sooner with any question or concerns.     Nursing Discharge Checklist:    Pneumovax screened and given, if appropriate: N/A  Influenza vaccine screened and given, if appropriate: N/A  Staples removed (): N/A  Breast milk returned: N/A  Hydrogel pads sent home:N/A  Birth Certificate Done: YES  Public Health Referral Made: N/A

## 2023-07-01 NOTE — PROGRESS NOTES
S: Shift review;    B:Jyoti is a  who delivered full term LGA baby vaginally on 23. Pushed 50 minutes; periurethral tears.  A: VSS, patient is independent with mobility, pain well controlled with p.o. pain meds. Handles baby with confidence. Repeat brstfr; nipples tender and soft; said needed shield with last baby. Shield initiated as baby chomping and causing mother pain. Successful fdg at 0410 on left using shield. Last prior fdg at 1800 followed by unsuccessful atempts. Large clot passed at 0400; appeared as 2 side by side golf balls. Fundus firm, U-1  on massage. Client agrees to report further clots.    R: Continue with routine PP care. Need completion of birth certificate and PPD score and dispensing of breast pump.  Encourage visit from lactation nurse prior to discharge.

## 2023-07-05 NOTE — PROGRESS NOTES
Jyoti New  Gender: female  : 2000  [id number]  [place] [id number] [phone] (home)   Medical Record: [id number]  Primary Care Provider: No Ref-Primary, Physician       [facility]   ?   Discharge Phone Call: Key Words/Key Times     How are you and the baby? Doing well    How are feedings going? Going well    Voiding & Stooling? Voiding and stooling, baby is gassy    Any questions or concerns? No    Follow-up appointment? Today with Micheal      We want to provide excellent care here at [facility]. Do you have any feedback for us that would help us improve? Care was perfect    Call back COMMENTS:   NA      Attempted Calls:   _________     __________

## 2023-07-13 ENCOUNTER — OFFICE VISIT (OUTPATIENT)
Dept: FAMILY MEDICINE | Facility: CLINIC | Age: 23
End: 2023-07-13
Payer: COMMERCIAL

## 2023-07-13 VITALS
RESPIRATION RATE: 16 BRPM | BODY MASS INDEX: 30.55 KG/M2 | OXYGEN SATURATION: 99 % | WEIGHT: 198 LBS | SYSTOLIC BLOOD PRESSURE: 112 MMHG | HEART RATE: 80 BPM | TEMPERATURE: 97.2 F | DIASTOLIC BLOOD PRESSURE: 72 MMHG

## 2023-07-13 DIAGNOSIS — Z12.4 CERVICAL CANCER SCREENING: ICD-10-CM

## 2023-07-13 DIAGNOSIS — Z11.3 SCREENING FOR STDS (SEXUALLY TRANSMITTED DISEASES): ICD-10-CM

## 2023-07-13 PROCEDURE — 99024 POSTOP FOLLOW-UP VISIT: CPT | Performed by: FAMILY MEDICINE

## 2023-07-13 ASSESSMENT — PAIN SCALES - GENERAL: PAINLEVEL: NO PAIN (0)

## 2023-07-13 NOTE — PROGRESS NOTES
This 22-year-old delivered vaginally number weeks ago.  She had a repair of a hole in her right labia at that time.  She returns for suture removal.  She feels well.    /72 (Cuff Size: Adult Large)   Pulse 80   Temp 97.2  F (36.2  C) (Temporal)   Resp 16   Wt 89.8 kg (198 lb)   LMP 09/20/2022   SpO2 99%   Breastfeeding Yes   BMI 30.55 kg/m     Well-appearing.  On her right labia there is a well-healed incision.  There are several blue sutures.  They were removed easily today.    Plan.  Labial repair.  Sutures removed today.  No sign of infection.  Return if any concerns.    Jose Burton MD Answers submitted by the patient for this visit:  General Questionnaire (Submitted on 7/13/2023)  Chief Complaint: Chronic problems general questions HPI Form  What is the reason for your visit today? : stitches removal  How many servings of fruits and vegetables do you eat daily?: 2-3  On average, how many sweetened beverages do you drink each day (Examples: soda, juice, sweet tea, etc.  Do NOT count diet or artificially sweetened beverages)?: 1  How many minutes a day do you exercise enough to make your heart beat faster?: 30 to 60  How many days a week do you exercise enough to make your heart beat faster?: 7  How many days per week do you miss taking your medication?: 0

## 2023-08-31 ENCOUNTER — OFFICE VISIT (OUTPATIENT)
Dept: FAMILY MEDICINE | Facility: CLINIC | Age: 23
End: 2023-08-31
Payer: COMMERCIAL

## 2023-08-31 DIAGNOSIS — Z30.09 COUNSELING FOR INITIATION OF BIRTH CONTROL METHOD: Primary | ICD-10-CM

## 2023-08-31 PROCEDURE — 11981 INSERTION DRUG DLVR IMPLANT: CPT | Performed by: FAMILY MEDICINE

## 2023-08-31 NOTE — PROGRESS NOTES
Subjective    22-year-old returns to discuss her birth control options.  Has used Depo in the past.  Is trying to lose weight.  Is not currently sexually active.  Not breast-feeding.  Has a 2-month-old at home.  Not planning any pregnancies in the near future.    Assessment and plan.  Birth control.  Discussed options.  Would like to proceed with Nexplanon which I think is a good choice for her.    Jose Burton MD       ICD-10-CM    1. Counseling for initiation of birth control method  Z30.09 etonogestrel (NEXPLANON) subdermal implant 68 mg         Procedure-after informed consent, I prepped the left medial upper arm just proximal to the left medial epicondyles with chlorhexidine.  Then about 7 cm proximal to the left medial epicondyle I anesthetized the area with 5 mL 1% lidocaine with epinephrine.  With good anesthesia, then used the Nexplanon device to safely discharge the Nexplanon romina subcutaneously.  Tolerated well.  Sterile bandage applied.

## 2023-09-06 ENCOUNTER — MYC MEDICAL ADVICE (OUTPATIENT)
Dept: FAMILY MEDICINE | Facility: CLINIC | Age: 23
End: 2023-09-06
Payer: COMMERCIAL

## 2024-07-14 ENCOUNTER — HEALTH MAINTENANCE LETTER (OUTPATIENT)
Age: 24
End: 2024-07-14

## (undated) DEVICE — ENDO TROCAR FIRST ENTRY KII FIOS Z-THRD 05X100MM CTF03

## (undated) DEVICE — ESU HOOK TIP 5MM CONMED

## (undated) DEVICE — ENDO TROCAR SLEEVE KII Z-THREADED 05X100MM CTS02

## (undated) DEVICE — SU MONOCRYL 4-0 PS-2 18" UND Y496G

## (undated) DEVICE — GLOVE PROTEXIS W/NEU-THERA 7.5  2D73TE75

## (undated) DEVICE — ESU ENDO SCISSORS 5MM CVD 5DCS

## (undated) DEVICE — PACK GENERAL LAPAOSCOPY

## (undated) DEVICE — ESU CORD MONOPOLAR 10'  E0510

## (undated) DEVICE — NDL INSUFFLATION 13GA 120MM C2201

## (undated) DEVICE — GLOVE PROTEXIS BLUE W/NEU-THERA 8.0  2D73EB80

## (undated) DEVICE — ENDO CLIP CARTIRDGE K2 MED/LG 10 1112

## (undated) DEVICE — ESU SUCTION/IRRIGATION SYSTEM PISTOL GRIP

## (undated) DEVICE — SOL NACL 0.9% INJ 1000ML BAG 07983-09

## (undated) DEVICE — ENDO TROCAR FIRST ENTRY KII FIOS Z-THRD 11X100MM CTF33

## (undated) DEVICE — ESU GROUND PAD UNIVERSAL W/O CORD

## (undated) DEVICE — ADH SKIN CLOSURE PREMIERPRO EXOFIN 1.0ML 3470

## (undated) RX ORDER — LIDOCAINE HYDROCHLORIDE 20 MG/ML
INJECTION, SOLUTION EPIDURAL; INFILTRATION; INTRACAUDAL; PERINEURAL
Status: DISPENSED
Start: 2021-03-01

## (undated) RX ORDER — DEXAMETHASONE SODIUM PHOSPHATE 10 MG/ML
INJECTION, SOLUTION INTRAMUSCULAR; INTRAVENOUS
Status: DISPENSED
Start: 2021-03-01

## (undated) RX ORDER — FENTANYL CITRATE 50 UG/ML
INJECTION, SOLUTION INTRAMUSCULAR; INTRAVENOUS
Status: DISPENSED
Start: 2021-03-01

## (undated) RX ORDER — CEFAZOLIN SODIUM 1 G/3ML
INJECTION, POWDER, FOR SOLUTION INTRAMUSCULAR; INTRAVENOUS
Status: DISPENSED
Start: 2021-03-01

## (undated) RX ORDER — HYDROMORPHONE HYDROCHLORIDE 1 MG/ML
INJECTION, SOLUTION INTRAMUSCULAR; INTRAVENOUS; SUBCUTANEOUS
Status: DISPENSED
Start: 2021-03-01

## (undated) RX ORDER — KETOROLAC TROMETHAMINE 30 MG/ML
INJECTION, SOLUTION INTRAMUSCULAR; INTRAVENOUS
Status: DISPENSED
Start: 2021-03-01

## (undated) RX ORDER — ONDANSETRON 2 MG/ML
INJECTION INTRAMUSCULAR; INTRAVENOUS
Status: DISPENSED
Start: 2021-03-01